# Patient Record
Sex: FEMALE | Race: WHITE | Employment: FULL TIME | URBAN - METROPOLITAN AREA
[De-identification: names, ages, dates, MRNs, and addresses within clinical notes are randomized per-mention and may not be internally consistent; named-entity substitution may affect disease eponyms.]

---

## 2017-03-11 ENCOUNTER — OFFICE VISIT (OUTPATIENT)
Dept: FAMILY MEDICINE CLINIC | Age: 72
End: 2017-03-11
Payer: MEDICARE

## 2017-03-11 VITALS
HEART RATE: 60 BPM | HEIGHT: 64 IN | SYSTOLIC BLOOD PRESSURE: 170 MMHG | BODY MASS INDEX: 22.2 KG/M2 | WEIGHT: 130 LBS | RESPIRATION RATE: 18 BRPM | DIASTOLIC BLOOD PRESSURE: 91 MMHG | OXYGEN SATURATION: 97 % | TEMPERATURE: 97.8 F

## 2017-03-11 DIAGNOSIS — J01.00 ACUTE MAXILLARY SINUSITIS, RECURRENCE NOT SPECIFIED: Primary | ICD-10-CM

## 2017-03-11 DIAGNOSIS — J20.9 ACUTE BRONCHITIS, UNSPECIFIED ORGANISM: ICD-10-CM

## 2017-03-11 PROCEDURE — 99213 OFFICE O/P EST LOW 20 MIN: CPT | Performed by: INTERNAL MEDICINE

## 2017-03-11 PROCEDURE — 1090F PRES/ABSN URINE INCON ASSESS: CPT | Performed by: INTERNAL MEDICINE

## 2017-03-11 PROCEDURE — G8484 FLU IMMUNIZE NO ADMIN: HCPCS | Performed by: INTERNAL MEDICINE

## 2017-03-11 PROCEDURE — 4040F PNEUMOC VAC/ADMIN/RCVD: CPT | Performed by: INTERNAL MEDICINE

## 2017-03-11 PROCEDURE — 1123F ACP DISCUSS/DSCN MKR DOCD: CPT | Performed by: INTERNAL MEDICINE

## 2017-03-11 PROCEDURE — G8419 CALC BMI OUT NRM PARAM NOF/U: HCPCS | Performed by: INTERNAL MEDICINE

## 2017-03-11 PROCEDURE — 1036F TOBACCO NON-USER: CPT | Performed by: INTERNAL MEDICINE

## 2017-03-11 PROCEDURE — 3017F COLORECTAL CA SCREEN DOC REV: CPT | Performed by: INTERNAL MEDICINE

## 2017-03-11 PROCEDURE — 3014F SCREEN MAMMO DOC REV: CPT | Performed by: INTERNAL MEDICINE

## 2017-03-11 PROCEDURE — G8400 PT W/DXA NO RESULTS DOC: HCPCS | Performed by: INTERNAL MEDICINE

## 2017-03-11 PROCEDURE — G8427 DOCREV CUR MEDS BY ELIG CLIN: HCPCS | Performed by: INTERNAL MEDICINE

## 2017-03-11 RX ORDER — HYDROXYCHLOROQUINE SULFATE 200 MG/1
TABLET, FILM COATED ORAL DAILY
COMMUNITY

## 2017-03-11 RX ORDER — DOXYCYCLINE HYCLATE 100 MG/1
100 CAPSULE ORAL 2 TIMES DAILY
Qty: 20 CAPSULE | Refills: 0 | Status: SHIPPED | OUTPATIENT
Start: 2017-03-11 | End: 2017-03-21

## 2017-03-11 ASSESSMENT — ENCOUNTER SYMPTOMS
COUGH: 1
SINUS PRESSURE: 1
SORE THROAT: 0
SHORTNESS OF BREATH: 0

## 2019-04-05 ENCOUNTER — HOSPITAL ENCOUNTER (OUTPATIENT)
Age: 74
Discharge: HOME | End: 2019-04-05
Payer: MEDICARE

## 2019-04-05 LAB
-: ABNORMAL
ABSOLUTE BANDS #: 0.53 K/UL (ref 0–1)
ABSOLUTE EOS #: 0 K/UL (ref 0–0.4)
ABSOLUTE IMMATURE GRANULOCYTE: ABNORMAL K/UL (ref 0–0.3)
ABSOLUTE LYMPH #: 0.26 K/UL (ref 1–4.8)
ABSOLUTE MONO #: 0.53 K/UL (ref 0.1–1.3)
ALBUMIN SERPL-MCNC: 3.4 G/DL (ref 3.5–5.2)
ALBUMIN/GLOBULIN RATIO: ABNORMAL (ref 1–2.5)
ALP BLD-CCNC: 74 U/L (ref 35–104)
ALT SERPL-CCNC: 13 U/L (ref 5–33)
AMORPHOUS: ABNORMAL
ANION GAP SERPL CALCULATED.3IONS-SCNC: 12 MMOL/L (ref 9–17)
AST SERPL-CCNC: 15 U/L
BACTERIA: ABNORMAL
BANDS: 6 % (ref 0–10)
BASOPHILS # BLD: 0 % (ref 0–2)
BASOPHILS ABSOLUTE: 0 K/UL (ref 0–0.2)
BILIRUB SERPL-MCNC: 0.47 MG/DL (ref 0.3–1.2)
BILIRUBIN URINE: NEGATIVE
BUN BLDV-MCNC: 38 MG/DL (ref 8–23)
BUN/CREAT BLD: ABNORMAL (ref 9–20)
C-REACTIVE PROTEIN: 124.6 MG/L (ref 0–5)
CALCIUM SERPL-MCNC: 9.5 MG/DL (ref 8.6–10.4)
CASTS UA: ABNORMAL /LPF
CHLORIDE BLD-SCNC: 107 MMOL/L (ref 98–107)
CO2: 18 MMOL/L (ref 20–31)
COLOR: YELLOW
COMMENT UA: ABNORMAL
COMPLEMENT C3: 110 MG/DL (ref 90–180)
COMPLEMENT C4: 24 MG/DL (ref 10–40)
CREAT SERPL-MCNC: 1.25 MG/DL (ref 0.5–0.9)
CRYSTALS, UA: ABNORMAL /HPF
DIFFERENTIAL TYPE: ABNORMAL
EOSINOPHILS RELATIVE PERCENT: 0 % (ref 0–4)
EPITHELIAL CELLS UA: ABNORMAL /HPF
GFR AFRICAN AMERICAN: 51 ML/MIN
GFR NON-AFRICAN AMERICAN: 42 ML/MIN
GFR SERPL CREATININE-BSD FRML MDRD: ABNORMAL ML/MIN/{1.73_M2}
GFR SERPL CREATININE-BSD FRML MDRD: ABNORMAL ML/MIN/{1.73_M2}
GLUCOSE BLD-MCNC: 98 MG/DL (ref 70–99)
GLUCOSE URINE: NEGATIVE
HCT VFR BLD CALC: 37.1 % (ref 36–46)
HEMOGLOBIN: 12.1 G/DL (ref 12–16)
IMMATURE GRANULOCYTES: ABNORMAL %
KETONES, URINE: NEGATIVE
LEUKOCYTE ESTERASE, URINE: ABNORMAL
LYMPHOCYTES # BLD: 3 % (ref 24–44)
MCH RBC QN AUTO: 31.2 PG (ref 26–34)
MCHC RBC AUTO-ENTMCNC: 32.6 G/DL (ref 31–37)
MCV RBC AUTO: 95.9 FL (ref 80–100)
MONOCYTES # BLD: 6 % (ref 1–7)
MORPHOLOGY: ABNORMAL
MUCUS: ABNORMAL
NITRITE, URINE: NEGATIVE
NRBC AUTOMATED: ABNORMAL PER 100 WBC
OTHER OBSERVATIONS UA: ABNORMAL
PDW BLD-RTO: 14.9 % (ref 11.5–14.9)
PH UA: 6.5 (ref 5–8)
PLATELET # BLD: 203 K/UL (ref 150–450)
PLATELET ESTIMATE: ABNORMAL
PMV BLD AUTO: 7.4 FL (ref 6–12)
POTASSIUM SERPL-SCNC: 5.1 MMOL/L (ref 3.7–5.3)
PROTEIN UA: ABNORMAL
RBC # BLD: 3.87 M/UL (ref 4–5.2)
RBC # BLD: ABNORMAL 10*6/UL
RBC UA: ABNORMAL /HPF
RENAL EPITHELIAL, UA: ABNORMAL /HPF
SEDIMENTATION RATE, ERYTHROCYTE: 57 MM (ref 0–20)
SEG NEUTROPHILS: 85 % (ref 36–66)
SEGMENTED NEUTROPHILS ABSOLUTE COUNT: 7.48 K/UL (ref 1.3–9.1)
SODIUM BLD-SCNC: 137 MMOL/L (ref 135–144)
SPECIFIC GRAVITY UA: 1.01 (ref 1–1.03)
TOTAL PROTEIN: 7.2 G/DL (ref 6.4–8.3)
TRICHOMONAS: ABNORMAL
TURBIDITY: ABNORMAL
URINE HGB: ABNORMAL
UROBILINOGEN, URINE: NORMAL
WBC # BLD: 8.8 K/UL (ref 3.5–11)
WBC # BLD: ABNORMAL 10*3/UL
WBC UA: ABNORMAL /HPF
YEAST: ABNORMAL

## 2019-04-05 PROCEDURE — 36415 COLL VENOUS BLD VENIPUNCTURE: CPT

## 2019-04-05 PROCEDURE — 87086 URINE CULTURE/COLONY COUNT: CPT

## 2019-04-05 PROCEDURE — 87186 SC STD MICRODIL/AGAR DIL: CPT

## 2019-04-05 PROCEDURE — 84156 ASSAY OF PROTEIN URINE: CPT

## 2019-04-05 PROCEDURE — 81001 URINALYSIS AUTO W/SCOPE: CPT

## 2019-04-05 PROCEDURE — 82570 ASSAY OF URINE CREATININE: CPT

## 2019-04-05 PROCEDURE — 86140 C-REACTIVE PROTEIN: CPT

## 2019-04-05 PROCEDURE — 85651 RBC SED RATE NONAUTOMATED: CPT

## 2019-04-05 PROCEDURE — 85025 COMPLETE CBC W/AUTO DIFF WBC: CPT

## 2019-04-05 PROCEDURE — 86160 COMPLEMENT ANTIGEN: CPT

## 2019-04-05 PROCEDURE — 80053 COMPREHEN METABOLIC PANEL: CPT

## 2019-04-05 PROCEDURE — 87088 URINE BACTERIA CULTURE: CPT

## 2019-04-06 LAB
CREATININE URINE: 77.6 MG/DL (ref 28–217)
CULTURE: ABNORMAL
Lab: ABNORMAL
SPECIMEN DESCRIPTION: ABNORMAL
TOTAL PROTEIN, URINE: 44 MG/DL
URINE TOTAL PROTEIN CREATININE RATIO: 0.57 (ref 0–0.2)

## 2019-05-31 ENCOUNTER — HOSPITAL ENCOUNTER (OUTPATIENT)
Age: 74
Discharge: HOME | End: 2019-05-31
Payer: MEDICARE

## 2019-05-31 LAB
-: ABNORMAL
ABSOLUTE EOS #: 0.06 K/UL (ref 0–0.4)
ABSOLUTE IMMATURE GRANULOCYTE: ABNORMAL K/UL (ref 0–0.3)
ABSOLUTE LYMPH #: 0.61 K/UL (ref 1–4.8)
ABSOLUTE MONO #: 0.32 K/UL (ref 0.1–1.3)
ALBUMIN SERPL-MCNC: 3.8 G/DL (ref 3.5–5.2)
ALBUMIN/GLOBULIN RATIO: ABNORMAL (ref 1–2.5)
ALP BLD-CCNC: 72 U/L (ref 35–104)
ALT SERPL-CCNC: 16 U/L (ref 5–33)
AMORPHOUS: ABNORMAL
ANION GAP SERPL CALCULATED.3IONS-SCNC: 11 MMOL/L (ref 9–17)
AST SERPL-CCNC: 24 U/L
BACTERIA: ABNORMAL
BASOPHILS # BLD: 0 % (ref 0–2)
BASOPHILS ABSOLUTE: 0 K/UL (ref 0–0.2)
BILIRUB SERPL-MCNC: 0.47 MG/DL (ref 0.3–1.2)
BILIRUBIN URINE: NEGATIVE
BUN BLDV-MCNC: 32 MG/DL (ref 8–23)
BUN/CREAT BLD: ABNORMAL (ref 9–20)
C-REACTIVE PROTEIN: 1.4 MG/L (ref 0–5)
CALCIUM SERPL-MCNC: 9.2 MG/DL (ref 8.6–10.4)
CASTS UA: ABNORMAL /LPF
CHLORIDE BLD-SCNC: 105 MMOL/L (ref 98–107)
CO2: 21 MMOL/L (ref 20–31)
COLOR: YELLOW
COMMENT UA: ABNORMAL
COMPLEMENT C3: 90 MG/DL (ref 90–180)
COMPLEMENT C4: 18 MG/DL (ref 10–40)
CREAT SERPL-MCNC: 1.36 MG/DL (ref 0.5–0.9)
CREATININE URINE: 92.3 MG/DL (ref 28–217)
CRYSTALS, UA: ABNORMAL /HPF
DIFFERENTIAL TYPE: ABNORMAL
EOSINOPHILS RELATIVE PERCENT: 2 % (ref 0–4)
EPITHELIAL CELLS UA: ABNORMAL /HPF
GFR AFRICAN AMERICAN: 46 ML/MIN
GFR NON-AFRICAN AMERICAN: 38 ML/MIN
GFR SERPL CREATININE-BSD FRML MDRD: ABNORMAL ML/MIN/{1.73_M2}
GFR SERPL CREATININE-BSD FRML MDRD: ABNORMAL ML/MIN/{1.73_M2}
GLUCOSE BLD-MCNC: 95 MG/DL (ref 70–99)
GLUCOSE URINE: NEGATIVE
HCT VFR BLD CALC: 41.8 % (ref 36–46)
HEMOGLOBIN: 13.7 G/DL (ref 12–16)
IMMATURE GRANULOCYTES: ABNORMAL %
KETONES, URINE: NEGATIVE
LEUKOCYTE ESTERASE, URINE: ABNORMAL
LYMPHOCYTES # BLD: 21 % (ref 24–44)
MCH RBC QN AUTO: 31.7 PG (ref 26–34)
MCHC RBC AUTO-ENTMCNC: 32.8 G/DL (ref 31–37)
MCV RBC AUTO: 96.6 FL (ref 80–100)
MONOCYTES # BLD: 11 % (ref 1–7)
MORPHOLOGY: ABNORMAL
MORPHOLOGY: ABNORMAL
MUCUS: ABNORMAL
NITRITE, URINE: NEGATIVE
NRBC AUTOMATED: ABNORMAL PER 100 WBC
OTHER OBSERVATIONS UA: ABNORMAL
PDW BLD-RTO: 15.2 % (ref 11.5–14.9)
PH UA: 6.5 (ref 5–8)
PLATELET # BLD: 179 K/UL (ref 150–450)
PLATELET ESTIMATE: ABNORMAL
PMV BLD AUTO: 7.7 FL (ref 6–12)
POTASSIUM SERPL-SCNC: 5.6 MMOL/L (ref 3.7–5.3)
PROTEIN UA: ABNORMAL
RBC # BLD: 4.33 M/UL (ref 4–5.2)
RBC # BLD: ABNORMAL 10*6/UL
RBC UA: ABNORMAL /HPF
RENAL EPITHELIAL, UA: ABNORMAL /HPF
SEDIMENTATION RATE, ERYTHROCYTE: 20 MM (ref 0–20)
SEG NEUTROPHILS: 66 % (ref 36–66)
SEGMENTED NEUTROPHILS ABSOLUTE COUNT: 1.91 K/UL (ref 1.3–9.1)
SODIUM BLD-SCNC: 137 MMOL/L (ref 135–144)
SPECIFIC GRAVITY UA: 1.01 (ref 1–1.03)
TOTAL PROTEIN, URINE: 24 MG/DL
TOTAL PROTEIN: 7.3 G/DL (ref 6.4–8.3)
TRICHOMONAS: ABNORMAL
TURBIDITY: ABNORMAL
URINE HGB: NEGATIVE
URINE TOTAL PROTEIN CREATININE RATIO: 0.26 (ref 0–0.2)
UROBILINOGEN, URINE: NORMAL
WBC # BLD: 2.9 K/UL (ref 3.5–11)
WBC # BLD: ABNORMAL 10*3/UL
WBC UA: ABNORMAL /HPF
YEAST: ABNORMAL

## 2019-05-31 PROCEDURE — 81001 URINALYSIS AUTO W/SCOPE: CPT

## 2019-05-31 PROCEDURE — 36415 COLL VENOUS BLD VENIPUNCTURE: CPT

## 2019-05-31 PROCEDURE — 85025 COMPLETE CBC W/AUTO DIFF WBC: CPT

## 2019-05-31 PROCEDURE — 87186 SC STD MICRODIL/AGAR DIL: CPT

## 2019-05-31 PROCEDURE — 87088 URINE BACTERIA CULTURE: CPT

## 2019-05-31 PROCEDURE — 86140 C-REACTIVE PROTEIN: CPT

## 2019-05-31 PROCEDURE — 86160 COMPLEMENT ANTIGEN: CPT

## 2019-05-31 PROCEDURE — 85651 RBC SED RATE NONAUTOMATED: CPT

## 2019-05-31 PROCEDURE — 80053 COMPREHEN METABOLIC PANEL: CPT

## 2019-05-31 PROCEDURE — 82570 ASSAY OF URINE CREATININE: CPT

## 2019-05-31 PROCEDURE — 87086 URINE CULTURE/COLONY COUNT: CPT

## 2019-05-31 PROCEDURE — 84156 ASSAY OF PROTEIN URINE: CPT

## 2019-06-01 LAB
CULTURE: ABNORMAL
Lab: ABNORMAL
SPECIMEN DESCRIPTION: ABNORMAL

## 2019-09-20 ENCOUNTER — HOSPITAL ENCOUNTER (OUTPATIENT)
Age: 74
Discharge: HOME | End: 2019-09-20
Payer: MEDICARE

## 2019-09-20 LAB
-: NORMAL
ALBUMIN SERPL-MCNC: 3.9 G/DL (ref 3.5–5.2)
ALBUMIN/GLOBULIN RATIO: ABNORMAL (ref 1–2.5)
ALP BLD-CCNC: 70 U/L (ref 35–104)
ALT SERPL-CCNC: 14 U/L (ref 5–33)
AMORPHOUS: NORMAL
ANION GAP SERPL CALCULATED.3IONS-SCNC: 13 MMOL/L (ref 9–17)
AST SERPL-CCNC: 20 U/L
BACTERIA: NORMAL
BILIRUB SERPL-MCNC: 0.47 MG/DL (ref 0.3–1.2)
BILIRUBIN URINE: NEGATIVE
BUN BLDV-MCNC: 35 MG/DL (ref 8–23)
BUN/CREAT BLD: ABNORMAL (ref 9–20)
C-REACTIVE PROTEIN: 11.5 MG/L (ref 0–5)
CALCIUM SERPL-MCNC: 9 MG/DL (ref 8.6–10.4)
CASTS UA: NORMAL /LPF
CHLORIDE BLD-SCNC: 106 MMOL/L (ref 98–107)
CO2: 19 MMOL/L (ref 20–31)
COLOR: YELLOW
COMMENT UA: ABNORMAL
COMPLEMENT C3: 93 MG/DL (ref 90–180)
COMPLEMENT C4: 18 MG/DL (ref 10–40)
CREAT SERPL-MCNC: 1.25 MG/DL (ref 0.5–0.9)
CREATININE URINE: 52.7 MG/DL (ref 28–217)
CRYSTALS, UA: NORMAL /HPF
EPITHELIAL CELLS UA: NORMAL /HPF
GFR AFRICAN AMERICAN: 51 ML/MIN
GFR NON-AFRICAN AMERICAN: 42 ML/MIN
GFR SERPL CREATININE-BSD FRML MDRD: ABNORMAL ML/MIN/{1.73_M2}
GFR SERPL CREATININE-BSD FRML MDRD: ABNORMAL ML/MIN/{1.73_M2}
GLUCOSE BLD-MCNC: 91 MG/DL (ref 70–99)
GLUCOSE URINE: NEGATIVE
KETONES, URINE: NEGATIVE
LEUKOCYTE ESTERASE, URINE: ABNORMAL
MUCUS: NORMAL
NITRITE, URINE: NEGATIVE
OTHER OBSERVATIONS UA: NORMAL
PH UA: 7 (ref 5–8)
POTASSIUM SERPL-SCNC: 5.3 MMOL/L (ref 3.7–5.3)
PROTEIN UA: ABNORMAL
RBC UA: NORMAL /HPF
RENAL EPITHELIAL, UA: NORMAL /HPF
SEDIMENTATION RATE, ERYTHROCYTE: 23 MM (ref 0–20)
SODIUM BLD-SCNC: 138 MMOL/L (ref 135–144)
SPECIFIC GRAVITY UA: 1.01 (ref 1–1.03)
TOTAL PROTEIN, URINE: 18 MG/DL
TOTAL PROTEIN: 7.1 G/DL (ref 6.4–8.3)
TRICHOMONAS: NORMAL
TURBIDITY: CLEAR
URINE HGB: NEGATIVE
URINE TOTAL PROTEIN CREATININE RATIO: 0.34 (ref 0–0.2)
UROBILINOGEN, URINE: NORMAL
WBC UA: NORMAL /HPF
YEAST: NORMAL

## 2019-09-20 PROCEDURE — 86140 C-REACTIVE PROTEIN: CPT

## 2019-09-20 PROCEDURE — 82570 ASSAY OF URINE CREATININE: CPT

## 2019-09-20 PROCEDURE — 81001 URINALYSIS AUTO W/SCOPE: CPT

## 2019-09-20 PROCEDURE — 85651 RBC SED RATE NONAUTOMATED: CPT

## 2019-09-20 PROCEDURE — 86160 COMPLEMENT ANTIGEN: CPT

## 2019-09-20 PROCEDURE — 80053 COMPREHEN METABOLIC PANEL: CPT

## 2019-09-20 PROCEDURE — 36415 COLL VENOUS BLD VENIPUNCTURE: CPT

## 2019-09-20 PROCEDURE — 84156 ASSAY OF PROTEIN URINE: CPT

## 2019-11-25 ENCOUNTER — HOSPITAL ENCOUNTER (OUTPATIENT)
Age: 74
Discharge: HOME | End: 2019-11-25
Payer: MEDICARE

## 2019-11-25 LAB
-: ABNORMAL
ABSOLUTE EOS #: 0.1 K/UL (ref 0–0.4)
ABSOLUTE IMMATURE GRANULOCYTE: ABNORMAL K/UL (ref 0–0.3)
ABSOLUTE LYMPH #: 0.4 K/UL (ref 1–4.8)
ABSOLUTE MONO #: 0.4 K/UL (ref 0.1–1.3)
ALBUMIN SERPL-MCNC: 3.3 G/DL (ref 3.5–5.2)
ALBUMIN/GLOBULIN RATIO: ABNORMAL (ref 1–2.5)
ALP BLD-CCNC: 75 U/L (ref 35–104)
ALT SERPL-CCNC: 12 U/L (ref 5–33)
AMORPHOUS: ABNORMAL
ANION GAP SERPL CALCULATED.3IONS-SCNC: 14 MMOL/L (ref 9–17)
AST SERPL-CCNC: 16 U/L
BACTERIA: ABNORMAL
BASOPHILS # BLD: 1 % (ref 0–2)
BASOPHILS ABSOLUTE: 0 K/UL (ref 0–0.2)
BILIRUB SERPL-MCNC: 0.36 MG/DL (ref 0.3–1.2)
BILIRUBIN URINE: NEGATIVE
BUN BLDV-MCNC: 33 MG/DL (ref 8–23)
BUN/CREAT BLD: ABNORMAL (ref 9–20)
C-REACTIVE PROTEIN: 91.8 MG/L (ref 0–5)
CALCIUM SERPL-MCNC: 9.3 MG/DL (ref 8.6–10.4)
CASTS UA: ABNORMAL /LPF
CASTS UA: ABNORMAL /LPF
CHLORIDE BLD-SCNC: 106 MMOL/L (ref 98–107)
CO2: 19 MMOL/L (ref 20–31)
COLOR: YELLOW
COMMENT UA: ABNORMAL
COMPLEMENT C3: 116 MG/DL (ref 90–180)
COMPLEMENT C4: 25 MG/DL (ref 10–40)
CREAT SERPL-MCNC: 1.4 MG/DL (ref 0.5–0.9)
CRYSTALS, UA: ABNORMAL /HPF
DIFFERENTIAL TYPE: ABNORMAL
EOSINOPHILS RELATIVE PERCENT: 2 % (ref 0–4)
EPITHELIAL CELLS UA: ABNORMAL /HPF
GFR AFRICAN AMERICAN: 45 ML/MIN
GFR NON-AFRICAN AMERICAN: 37 ML/MIN
GFR SERPL CREATININE-BSD FRML MDRD: ABNORMAL ML/MIN/{1.73_M2}
GFR SERPL CREATININE-BSD FRML MDRD: ABNORMAL ML/MIN/{1.73_M2}
GLUCOSE BLD-MCNC: 85 MG/DL (ref 70–99)
GLUCOSE URINE: NEGATIVE
HCT VFR BLD CALC: 37.8 % (ref 36–46)
HEMOGLOBIN: 12.4 G/DL (ref 12–16)
IMMATURE GRANULOCYTES: ABNORMAL %
KETONES, URINE: NEGATIVE
LEUKOCYTE ESTERASE, URINE: ABNORMAL
LYMPHOCYTES # BLD: 10 % (ref 24–44)
MCH RBC QN AUTO: 31.6 PG (ref 26–34)
MCHC RBC AUTO-ENTMCNC: 32.9 G/DL (ref 31–37)
MCV RBC AUTO: 96.2 FL (ref 80–100)
MONOCYTES # BLD: 10 % (ref 1–7)
MUCUS: ABNORMAL
NITRITE, URINE: NEGATIVE
NRBC AUTOMATED: ABNORMAL PER 100 WBC
OTHER OBSERVATIONS UA: ABNORMAL
PDW BLD-RTO: 13.7 % (ref 11.5–14.9)
PH UA: 7 (ref 5–8)
PLATELET # BLD: 211 K/UL (ref 150–450)
PLATELET ESTIMATE: ABNORMAL
PMV BLD AUTO: 7.1 FL (ref 6–12)
POTASSIUM SERPL-SCNC: 4.6 MMOL/L (ref 3.7–5.3)
PROTEIN UA: ABNORMAL
RBC # BLD: 3.93 M/UL (ref 4–5.2)
RBC # BLD: ABNORMAL 10*6/UL
RBC UA: ABNORMAL /HPF
RENAL EPITHELIAL, UA: ABNORMAL /HPF
SEDIMENTATION RATE, ERYTHROCYTE: 76 MM (ref 0–20)
SEG NEUTROPHILS: 77 % (ref 36–66)
SEGMENTED NEUTROPHILS ABSOLUTE COUNT: 3.3 K/UL (ref 1.3–9.1)
SODIUM BLD-SCNC: 139 MMOL/L (ref 135–144)
SPECIFIC GRAVITY UA: 1.01 (ref 1–1.03)
TOTAL PROTEIN, URINE: 36 MG/DL
TOTAL PROTEIN: 7.3 G/DL (ref 6.4–8.3)
TRICHOMONAS: ABNORMAL
TURBIDITY: ABNORMAL
URINE HGB: NEGATIVE
UROBILINOGEN, URINE: NORMAL
WBC # BLD: 4.3 K/UL (ref 3.5–11)
WBC # BLD: ABNORMAL 10*3/UL
WBC UA: ABNORMAL /HPF
YEAST: ABNORMAL

## 2019-11-25 PROCEDURE — 80053 COMPREHEN METABOLIC PANEL: CPT

## 2019-11-25 PROCEDURE — 87088 URINE BACTERIA CULTURE: CPT

## 2019-11-25 PROCEDURE — 36415 COLL VENOUS BLD VENIPUNCTURE: CPT

## 2019-11-25 PROCEDURE — 85025 COMPLETE CBC W/AUTO DIFF WBC: CPT

## 2019-11-25 PROCEDURE — 87086 URINE CULTURE/COLONY COUNT: CPT

## 2019-11-25 PROCEDURE — 81001 URINALYSIS AUTO W/SCOPE: CPT

## 2019-11-25 PROCEDURE — 86140 C-REACTIVE PROTEIN: CPT

## 2019-11-25 PROCEDURE — 86160 COMPLEMENT ANTIGEN: CPT

## 2019-11-25 PROCEDURE — 84156 ASSAY OF PROTEIN URINE: CPT

## 2019-11-25 PROCEDURE — 85651 RBC SED RATE NONAUTOMATED: CPT

## 2019-11-27 LAB
CULTURE: ABNORMAL
Lab: ABNORMAL
SPECIMEN DESCRIPTION: ABNORMAL

## 2020-01-21 ENCOUNTER — HOSPITAL ENCOUNTER (OUTPATIENT)
Age: 75
Discharge: HOME | End: 2020-01-21
Payer: MEDICARE

## 2020-01-21 LAB
-: ABNORMAL
ABSOLUTE EOS #: 0.1 K/UL (ref 0–0.4)
ABSOLUTE IMMATURE GRANULOCYTE: ABNORMAL K/UL (ref 0–0.3)
ABSOLUTE LYMPH #: 0.6 K/UL (ref 1–4.8)
ABSOLUTE MONO #: 0.4 K/UL (ref 0.1–1.3)
ALBUMIN SERPL-MCNC: 3.9 G/DL (ref 3.5–5.2)
ALBUMIN/GLOBULIN RATIO: ABNORMAL (ref 1–2.5)
ALP BLD-CCNC: 63 U/L (ref 35–104)
ALT SERPL-CCNC: 13 U/L (ref 5–33)
AMORPHOUS: ABNORMAL
ANION GAP SERPL CALCULATED.3IONS-SCNC: 12 MMOL/L (ref 9–17)
AST SERPL-CCNC: 20 U/L
BACTERIA: ABNORMAL
BASOPHILS # BLD: 1 % (ref 0–2)
BASOPHILS ABSOLUTE: 0 K/UL (ref 0–0.2)
BILIRUB SERPL-MCNC: 0.31 MG/DL (ref 0.3–1.2)
BILIRUBIN URINE: NEGATIVE
BUN BLDV-MCNC: 33 MG/DL (ref 8–23)
BUN/CREAT BLD: ABNORMAL (ref 9–20)
C-REACTIVE PROTEIN: 1.2 MG/L (ref 0–5)
CALCIUM SERPL-MCNC: 8.7 MG/DL (ref 8.6–10.4)
CASTS UA: ABNORMAL /LPF
CHLORIDE BLD-SCNC: 106 MMOL/L (ref 98–107)
CO2: 18 MMOL/L (ref 20–31)
COLOR: YELLOW
COMMENT UA: ABNORMAL
COMPLEMENT C3: 79 MG/DL (ref 90–180)
COMPLEMENT C4: 16 MG/DL (ref 10–40)
CREAT SERPL-MCNC: 1.34 MG/DL (ref 0.5–0.9)
CREATININE URINE: 97.8 MG/DL (ref 28–217)
CRYSTALS, UA: ABNORMAL /HPF
DIFFERENTIAL TYPE: ABNORMAL
EOSINOPHILS RELATIVE PERCENT: 4 % (ref 0–4)
EPITHELIAL CELLS UA: ABNORMAL /HPF
GFR AFRICAN AMERICAN: 47 ML/MIN
GFR NON-AFRICAN AMERICAN: 39 ML/MIN
GFR SERPL CREATININE-BSD FRML MDRD: ABNORMAL ML/MIN/{1.73_M2}
GFR SERPL CREATININE-BSD FRML MDRD: ABNORMAL ML/MIN/{1.73_M2}
GLUCOSE BLD-MCNC: 78 MG/DL (ref 70–99)
GLUCOSE URINE: NEGATIVE
HCT VFR BLD CALC: 40.8 % (ref 36–46)
HEMOGLOBIN: 13.3 G/DL (ref 12–16)
IMMATURE GRANULOCYTES: ABNORMAL %
KETONES, URINE: NEGATIVE
LEUKOCYTE ESTERASE, URINE: NEGATIVE
LYMPHOCYTES # BLD: 18 % (ref 24–44)
MCH RBC QN AUTO: 31.8 PG (ref 26–34)
MCHC RBC AUTO-ENTMCNC: 32.6 G/DL (ref 31–37)
MCV RBC AUTO: 97.6 FL (ref 80–100)
MONOCYTES # BLD: 11 % (ref 1–7)
MUCUS: ABNORMAL
NITRITE, URINE: NEGATIVE
NRBC AUTOMATED: ABNORMAL PER 100 WBC
OTHER OBSERVATIONS UA: ABNORMAL
PDW BLD-RTO: 15.5 % (ref 11.5–14.9)
PH UA: 6.5 (ref 5–8)
PLATELET # BLD: 179 K/UL (ref 150–450)
PLATELET ESTIMATE: ABNORMAL
PMV BLD AUTO: 7.4 FL (ref 6–12)
POTASSIUM SERPL-SCNC: 4.7 MMOL/L (ref 3.7–5.3)
PROTEIN UA: ABNORMAL
RBC # BLD: 4.18 M/UL (ref 4–5.2)
RBC # BLD: ABNORMAL 10*6/UL
RBC UA: ABNORMAL /HPF
RENAL EPITHELIAL, UA: ABNORMAL /HPF
SEDIMENTATION RATE, ERYTHROCYTE: 18 MM (ref 0–20)
SEG NEUTROPHILS: 66 % (ref 36–66)
SEGMENTED NEUTROPHILS ABSOLUTE COUNT: 2.3 K/UL (ref 1.3–9.1)
SODIUM BLD-SCNC: 136 MMOL/L (ref 135–144)
SPECIFIC GRAVITY UA: 1.02 (ref 1–1.03)
TOTAL PROTEIN, URINE: 46 MG/DL
TOTAL PROTEIN: 7 G/DL (ref 6.4–8.3)
TRICHOMONAS: ABNORMAL
TURBIDITY: CLEAR
URINE HGB: NEGATIVE
URINE TOTAL PROTEIN CREATININE RATIO: 0.47 (ref 0–0.2)
UROBILINOGEN, URINE: NORMAL
WBC # BLD: 3.5 K/UL (ref 3.5–11)
WBC # BLD: ABNORMAL 10*3/UL
WBC UA: ABNORMAL /HPF
YEAST: ABNORMAL

## 2020-01-21 PROCEDURE — 86140 C-REACTIVE PROTEIN: CPT

## 2020-01-21 PROCEDURE — 85025 COMPLETE CBC W/AUTO DIFF WBC: CPT

## 2020-01-21 PROCEDURE — 80053 COMPREHEN METABOLIC PANEL: CPT

## 2020-01-21 PROCEDURE — 36415 COLL VENOUS BLD VENIPUNCTURE: CPT

## 2020-01-21 PROCEDURE — 86160 COMPLEMENT ANTIGEN: CPT

## 2020-01-21 PROCEDURE — 85651 RBC SED RATE NONAUTOMATED: CPT

## 2020-01-21 PROCEDURE — 82570 ASSAY OF URINE CREATININE: CPT

## 2020-01-21 PROCEDURE — 81001 URINALYSIS AUTO W/SCOPE: CPT

## 2020-01-21 PROCEDURE — 84156 ASSAY OF PROTEIN URINE: CPT

## 2020-01-21 PROCEDURE — 87086 URINE CULTURE/COLONY COUNT: CPT

## 2020-01-22 LAB
CULTURE: NO GROWTH
Lab: NORMAL
SPECIMEN DESCRIPTION: NORMAL

## 2020-05-20 ENCOUNTER — HOSPITAL ENCOUNTER (OUTPATIENT)
Age: 75
Discharge: HOME | End: 2020-05-20
Payer: MEDICARE

## 2020-05-20 LAB
-: ABNORMAL
ABSOLUTE EOS #: 0.1 K/UL (ref 0–0.4)
ABSOLUTE IMMATURE GRANULOCYTE: ABNORMAL K/UL (ref 0–0.3)
ABSOLUTE LYMPH #: 0.5 K/UL (ref 1–4.8)
ABSOLUTE MONO #: 0.5 K/UL (ref 0.1–1.3)
ALBUMIN SERPL-MCNC: 3.8 G/DL (ref 3.5–5.2)
ALBUMIN/GLOBULIN RATIO: ABNORMAL (ref 1–2.5)
ALP BLD-CCNC: 71 U/L (ref 35–104)
ALT SERPL-CCNC: 12 U/L (ref 5–33)
AMORPHOUS: ABNORMAL
ANION GAP SERPL CALCULATED.3IONS-SCNC: 14 MMOL/L (ref 9–17)
AST SERPL-CCNC: 18 U/L
BACTERIA: ABNORMAL
BASOPHILS # BLD: 1 % (ref 0–2)
BASOPHILS ABSOLUTE: 0 K/UL (ref 0–0.2)
BILIRUB SERPL-MCNC: 0.32 MG/DL (ref 0.3–1.2)
BILIRUBIN URINE: NEGATIVE
BUN BLDV-MCNC: 24 MG/DL (ref 8–23)
BUN/CREAT BLD: ABNORMAL (ref 9–20)
C-REACTIVE PROTEIN: 46.5 MG/L (ref 0–5)
CALCIUM SERPL-MCNC: 9.1 MG/DL (ref 8.6–10.4)
CASTS UA: ABNORMAL /LPF
CHLORIDE BLD-SCNC: 105 MMOL/L (ref 98–107)
CO2: 20 MMOL/L (ref 20–31)
COLOR: YELLOW
COMMENT UA: ABNORMAL
COMPLEMENT C3: 102 MG/DL (ref 90–180)
COMPLEMENT C4: 22 MG/DL (ref 10–40)
CREAT SERPL-MCNC: 1.51 MG/DL (ref 0.5–0.9)
CREATININE URINE: 131 MG/DL (ref 28–217)
CRYSTALS, UA: ABNORMAL /HPF
DIFFERENTIAL TYPE: ABNORMAL
EOSINOPHILS RELATIVE PERCENT: 1 % (ref 0–4)
EPITHELIAL CELLS UA: ABNORMAL /HPF
GFR AFRICAN AMERICAN: 41 ML/MIN
GFR NON-AFRICAN AMERICAN: 34 ML/MIN
GFR SERPL CREATININE-BSD FRML MDRD: ABNORMAL ML/MIN/{1.73_M2}
GFR SERPL CREATININE-BSD FRML MDRD: ABNORMAL ML/MIN/{1.73_M2}
GLUCOSE BLD-MCNC: 90 MG/DL (ref 70–99)
GLUCOSE URINE: NEGATIVE
HCT VFR BLD CALC: 41.6 % (ref 36–46)
HEMOGLOBIN: 13.8 G/DL (ref 12–16)
IMMATURE GRANULOCYTES: ABNORMAL %
KETONES, URINE: NEGATIVE
LEUKOCYTE ESTERASE, URINE: ABNORMAL
LYMPHOCYTES # BLD: 11 % (ref 24–44)
MCH RBC QN AUTO: 32.5 PG (ref 26–34)
MCHC RBC AUTO-ENTMCNC: 33.1 G/DL (ref 31–37)
MCV RBC AUTO: 98 FL (ref 80–100)
MONOCYTES # BLD: 12 % (ref 1–7)
MUCUS: ABNORMAL
NITRITE, URINE: NEGATIVE
NRBC AUTOMATED: ABNORMAL PER 100 WBC
OTHER OBSERVATIONS UA: ABNORMAL
PDW BLD-RTO: 14.8 % (ref 11.5–14.9)
PH UA: 6 (ref 5–8)
PLATELET # BLD: 155 K/UL (ref 150–450)
PLATELET ESTIMATE: ABNORMAL
PMV BLD AUTO: 7.3 FL (ref 6–12)
POTASSIUM SERPL-SCNC: 4.2 MMOL/L (ref 3.7–5.3)
PROTEIN UA: ABNORMAL
RBC # BLD: 4.24 M/UL (ref 4–5.2)
RBC # BLD: ABNORMAL 10*6/UL
RBC UA: ABNORMAL /HPF
RENAL EPITHELIAL, UA: ABNORMAL /HPF
SEDIMENTATION RATE, ERYTHROCYTE: 30 MM (ref 0–20)
SEG NEUTROPHILS: 75 % (ref 36–66)
SEGMENTED NEUTROPHILS ABSOLUTE COUNT: 3.1 K/UL (ref 1.3–9.1)
SODIUM BLD-SCNC: 139 MMOL/L (ref 135–144)
SPECIFIC GRAVITY UA: 1.01 (ref 1–1.03)
TOTAL PROTEIN, URINE: 69 MG/DL
TOTAL PROTEIN: 7.4 G/DL (ref 6.4–8.3)
TRICHOMONAS: ABNORMAL
TURBIDITY: ABNORMAL
URINE HGB: NEGATIVE
URINE TOTAL PROTEIN CREATININE RATIO: 0.53 (ref 0–0.2)
UROBILINOGEN, URINE: NORMAL
WBC # BLD: 4.2 K/UL (ref 3.5–11)
WBC # BLD: ABNORMAL 10*3/UL
WBC UA: ABNORMAL /HPF
YEAST: ABNORMAL

## 2020-05-20 PROCEDURE — 36415 COLL VENOUS BLD VENIPUNCTURE: CPT

## 2020-05-20 PROCEDURE — 86140 C-REACTIVE PROTEIN: CPT

## 2020-05-20 PROCEDURE — 81001 URINALYSIS AUTO W/SCOPE: CPT

## 2020-05-20 PROCEDURE — 86160 COMPLEMENT ANTIGEN: CPT

## 2020-05-20 PROCEDURE — 85651 RBC SED RATE NONAUTOMATED: CPT

## 2020-05-20 PROCEDURE — 80053 COMPREHEN METABOLIC PANEL: CPT

## 2020-05-20 PROCEDURE — 82570 ASSAY OF URINE CREATININE: CPT

## 2020-05-20 PROCEDURE — 85025 COMPLETE CBC W/AUTO DIFF WBC: CPT

## 2020-05-20 PROCEDURE — 84156 ASSAY OF PROTEIN URINE: CPT

## 2020-07-20 ENCOUNTER — HOSPITAL ENCOUNTER (OUTPATIENT)
Age: 75
Discharge: HOME | End: 2020-07-20
Payer: MEDICARE

## 2020-07-20 LAB
-: ABNORMAL
ABSOLUTE EOS #: 0.1 K/UL (ref 0–0.4)
ABSOLUTE IMMATURE GRANULOCYTE: ABNORMAL K/UL (ref 0–0.3)
ABSOLUTE LYMPH #: 0.7 K/UL (ref 1–4.8)
ABSOLUTE MONO #: 0.4 K/UL (ref 0.1–1.3)
ALBUMIN SERPL-MCNC: 3.8 G/DL (ref 3.5–5.2)
ALBUMIN/GLOBULIN RATIO: ABNORMAL (ref 1–2.5)
ALP BLD-CCNC: 77 U/L (ref 35–104)
ALT SERPL-CCNC: 13 U/L (ref 5–33)
AMORPHOUS: ABNORMAL
ANION GAP SERPL CALCULATED.3IONS-SCNC: 15 MMOL/L (ref 9–17)
AST SERPL-CCNC: 22 U/L
BACTERIA: ABNORMAL
BASOPHILS # BLD: 1 % (ref 0–2)
BASOPHILS ABSOLUTE: 0 K/UL (ref 0–0.2)
BILIRUB SERPL-MCNC: 0.24 MG/DL (ref 0.3–1.2)
BILIRUBIN URINE: NEGATIVE
BUN BLDV-MCNC: 38 MG/DL (ref 8–23)
BUN/CREAT BLD: ABNORMAL (ref 9–20)
C-REACTIVE PROTEIN: 4.7 MG/L (ref 0–5)
CALCIUM SERPL-MCNC: 9.4 MG/DL (ref 8.6–10.4)
CASTS UA: ABNORMAL /LPF
CHLORIDE BLD-SCNC: 107 MMOL/L (ref 98–107)
CO2: 16 MMOL/L (ref 20–31)
COLOR: YELLOW
COMMENT UA: ABNORMAL
COMPLEMENT C3: 88 MG/DL (ref 90–180)
COMPLEMENT C4: 23 MG/DL (ref 10–40)
CREAT SERPL-MCNC: 1.69 MG/DL (ref 0.5–0.9)
CREATININE URINE: 93.4 MG/DL (ref 28–217)
CRYSTALS, UA: ABNORMAL /HPF
DIFFERENTIAL TYPE: ABNORMAL
EOSINOPHILS RELATIVE PERCENT: 2 % (ref 0–4)
EPITHELIAL CELLS UA: ABNORMAL /HPF
GFR AFRICAN AMERICAN: 36 ML/MIN
GFR NON-AFRICAN AMERICAN: 30 ML/MIN
GFR SERPL CREATININE-BSD FRML MDRD: ABNORMAL ML/MIN/{1.73_M2}
GFR SERPL CREATININE-BSD FRML MDRD: ABNORMAL ML/MIN/{1.73_M2}
GLUCOSE BLD-MCNC: 86 MG/DL (ref 70–99)
GLUCOSE URINE: NEGATIVE
HCT VFR BLD CALC: 43.7 % (ref 36–46)
HEMOGLOBIN: 14.1 G/DL (ref 12–16)
IMMATURE GRANULOCYTES: ABNORMAL %
KETONES, URINE: NEGATIVE
LEUKOCYTE ESTERASE, URINE: ABNORMAL
LYMPHOCYTES # BLD: 17 % (ref 24–44)
MCH RBC QN AUTO: 31.6 PG (ref 26–34)
MCHC RBC AUTO-ENTMCNC: 32.3 G/DL (ref 31–37)
MCV RBC AUTO: 98 FL (ref 80–100)
MONOCYTES # BLD: 10 % (ref 1–7)
MUCUS: ABNORMAL
NITRITE, URINE: NEGATIVE
NRBC AUTOMATED: ABNORMAL PER 100 WBC
OTHER OBSERVATIONS UA: ABNORMAL
PDW BLD-RTO: 15 % (ref 11.5–14.9)
PH UA: 6.5 (ref 5–8)
PLATELET # BLD: 171 K/UL (ref 150–450)
PLATELET ESTIMATE: ABNORMAL
PMV BLD AUTO: 7.5 FL (ref 6–12)
POTASSIUM SERPL-SCNC: 4.7 MMOL/L (ref 3.7–5.3)
PROTEIN UA: ABNORMAL
RBC # BLD: 4.46 M/UL (ref 4–5.2)
RBC # BLD: ABNORMAL 10*6/UL
RBC UA: ABNORMAL /HPF
RENAL EPITHELIAL, UA: ABNORMAL /HPF
SEDIMENTATION RATE, ERYTHROCYTE: 23 MM (ref 0–20)
SEG NEUTROPHILS: 70 % (ref 36–66)
SEGMENTED NEUTROPHILS ABSOLUTE COUNT: 2.9 K/UL (ref 1.3–9.1)
SODIUM BLD-SCNC: 138 MMOL/L (ref 135–144)
SPECIFIC GRAVITY UA: 1.01 (ref 1–1.03)
TOTAL PROTEIN, URINE: 36 MG/DL
TOTAL PROTEIN: 7.6 G/DL (ref 6.4–8.3)
TRICHOMONAS: ABNORMAL
TURBIDITY: CLEAR
URINE HGB: NEGATIVE
URINE TOTAL PROTEIN CREATININE RATIO: 0.39 (ref 0–0.2)
UROBILINOGEN, URINE: NORMAL
WBC # BLD: 4.1 K/UL (ref 3.5–11)
WBC # BLD: ABNORMAL 10*3/UL
WBC UA: ABNORMAL /HPF
YEAST: ABNORMAL

## 2020-07-20 PROCEDURE — 87086 URINE CULTURE/COLONY COUNT: CPT

## 2020-07-20 PROCEDURE — 87186 SC STD MICRODIL/AGAR DIL: CPT

## 2020-07-20 PROCEDURE — 86160 COMPLEMENT ANTIGEN: CPT

## 2020-07-20 PROCEDURE — 36415 COLL VENOUS BLD VENIPUNCTURE: CPT

## 2020-07-20 PROCEDURE — 86140 C-REACTIVE PROTEIN: CPT

## 2020-07-20 PROCEDURE — 81001 URINALYSIS AUTO W/SCOPE: CPT

## 2020-07-20 PROCEDURE — 82570 ASSAY OF URINE CREATININE: CPT

## 2020-07-20 PROCEDURE — 80053 COMPREHEN METABOLIC PANEL: CPT

## 2020-07-20 PROCEDURE — 85025 COMPLETE CBC W/AUTO DIFF WBC: CPT

## 2020-07-20 PROCEDURE — 87077 CULTURE AEROBIC IDENTIFY: CPT

## 2020-07-20 PROCEDURE — 84156 ASSAY OF PROTEIN URINE: CPT

## 2020-07-20 PROCEDURE — 85651 RBC SED RATE NONAUTOMATED: CPT

## 2020-07-23 LAB
CULTURE: ABNORMAL
Lab: ABNORMAL
SPECIMEN DESCRIPTION: ABNORMAL

## 2020-08-28 ENCOUNTER — HOSPITAL ENCOUNTER (OUTPATIENT)
Age: 75
Discharge: HOME | End: 2020-08-28
Payer: MEDICARE

## 2020-08-28 LAB
-: ABNORMAL
ABSOLUTE EOS #: 0 K/UL (ref 0–0.4)
ABSOLUTE IMMATURE GRANULOCYTE: ABNORMAL K/UL (ref 0–0.3)
ABSOLUTE LYMPH #: 0.7 K/UL (ref 1–4.8)
ABSOLUTE MONO #: 0.4 K/UL (ref 0.1–1.3)
ALBUMIN SERPL-MCNC: 3.8 G/DL (ref 3.5–5.2)
ALBUMIN/GLOBULIN RATIO: ABNORMAL (ref 1–2.5)
ALP BLD-CCNC: 68 U/L (ref 35–104)
ALT SERPL-CCNC: 12 U/L (ref 5–33)
AMORPHOUS: ABNORMAL
ANION GAP SERPL CALCULATED.3IONS-SCNC: 10 MMOL/L (ref 9–17)
AST SERPL-CCNC: 21 U/L
BACTERIA: ABNORMAL
BASOPHILS # BLD: 1 % (ref 0–2)
BASOPHILS ABSOLUTE: 0 K/UL (ref 0–0.2)
BILIRUB SERPL-MCNC: 0.31 MG/DL (ref 0.3–1.2)
BILIRUBIN URINE: NEGATIVE
BUN BLDV-MCNC: 27 MG/DL (ref 8–23)
BUN/CREAT BLD: ABNORMAL (ref 9–20)
C-REACTIVE PROTEIN: 5 MG/L (ref 0–5)
CALCIUM SERPL-MCNC: 8.9 MG/DL (ref 8.6–10.4)
CASTS UA: ABNORMAL /LPF
CHLORIDE BLD-SCNC: 106 MMOL/L (ref 98–107)
CO2: 19 MMOL/L (ref 20–31)
COLOR: YELLOW
COMMENT UA: ABNORMAL
COMPLEMENT C3: 87 MG/DL (ref 90–180)
COMPLEMENT C4: 18 MG/DL (ref 10–40)
CREAT SERPL-MCNC: 1.34 MG/DL (ref 0.5–0.9)
CREATININE URINE: 68.6 MG/DL (ref 28–217)
CREATININE URINE: 71.3 MG/DL (ref 28–217)
CRYSTALS, UA: ABNORMAL /HPF
DIFFERENTIAL TYPE: ABNORMAL
EOSINOPHILS RELATIVE PERCENT: 1 % (ref 0–4)
EPITHELIAL CELLS UA: ABNORMAL /HPF
GFR AFRICAN AMERICAN: 47 ML/MIN
GFR NON-AFRICAN AMERICAN: 39 ML/MIN
GFR SERPL CREATININE-BSD FRML MDRD: ABNORMAL ML/MIN/{1.73_M2}
GFR SERPL CREATININE-BSD FRML MDRD: ABNORMAL ML/MIN/{1.73_M2}
GLUCOSE BLD-MCNC: 80 MG/DL (ref 70–99)
GLUCOSE URINE: NEGATIVE
HCT VFR BLD CALC: 40.5 % (ref 36–46)
HEMOGLOBIN: 13.3 G/DL (ref 12–16)
IMMATURE GRANULOCYTES: ABNORMAL %
KETONES, URINE: NEGATIVE
LEUKOCYTE ESTERASE, URINE: ABNORMAL
LYMPHOCYTES # BLD: 17 % (ref 24–44)
MCH RBC QN AUTO: 32.1 PG (ref 26–34)
MCHC RBC AUTO-ENTMCNC: 32.7 G/DL (ref 31–37)
MCV RBC AUTO: 97.9 FL (ref 80–100)
MONOCYTES # BLD: 11 % (ref 1–7)
MUCUS: ABNORMAL
NITRITE, URINE: NEGATIVE
NRBC AUTOMATED: ABNORMAL PER 100 WBC
OTHER OBSERVATIONS UA: ABNORMAL
PDW BLD-RTO: 14.7 % (ref 11.5–14.9)
PH UA: 6.5 (ref 5–8)
PLATELET # BLD: 186 K/UL (ref 150–450)
PLATELET ESTIMATE: ABNORMAL
PMV BLD AUTO: 7.6 FL (ref 6–12)
POTASSIUM SERPL-SCNC: 4.6 MMOL/L (ref 3.7–5.3)
PROTEIN UA: ABNORMAL
RBC # BLD: 4.14 M/UL (ref 4–5.2)
RBC # BLD: ABNORMAL 10*6/UL
RBC UA: ABNORMAL /HPF
RENAL EPITHELIAL, UA: ABNORMAL /HPF
SEDIMENTATION RATE, ERYTHROCYTE: 36 MM (ref 0–20)
SEG NEUTROPHILS: 70 % (ref 36–66)
SEGMENTED NEUTROPHILS ABSOLUTE COUNT: 2.8 K/UL (ref 1.3–9.1)
SODIUM BLD-SCNC: 135 MMOL/L (ref 135–144)
SPECIFIC GRAVITY UA: 1.01 (ref 1–1.03)
TOTAL PROTEIN, URINE: 22 MG/DL
TOTAL PROTEIN: 6.9 G/DL (ref 6.4–8.3)
TRICHOMONAS: ABNORMAL
TURBIDITY: CLEAR
URINE HGB: NEGATIVE
URINE TOTAL PROTEIN CREATININE RATIO: 0.31 (ref 0–0.2)
UROBILINOGEN, URINE: NORMAL
WBC # BLD: 4 K/UL (ref 3.5–11)
WBC # BLD: ABNORMAL 10*3/UL
WBC UA: ABNORMAL /HPF
YEAST: ABNORMAL

## 2020-08-28 PROCEDURE — 87186 SC STD MICRODIL/AGAR DIL: CPT

## 2020-08-28 PROCEDURE — 82570 ASSAY OF URINE CREATININE: CPT

## 2020-08-28 PROCEDURE — 87086 URINE CULTURE/COLONY COUNT: CPT

## 2020-08-28 PROCEDURE — 85651 RBC SED RATE NONAUTOMATED: CPT

## 2020-08-28 PROCEDURE — 81001 URINALYSIS AUTO W/SCOPE: CPT

## 2020-08-28 PROCEDURE — 80053 COMPREHEN METABOLIC PANEL: CPT

## 2020-08-28 PROCEDURE — 85025 COMPLETE CBC W/AUTO DIFF WBC: CPT

## 2020-08-28 PROCEDURE — 87088 URINE BACTERIA CULTURE: CPT

## 2020-08-28 PROCEDURE — 36415 COLL VENOUS BLD VENIPUNCTURE: CPT

## 2020-08-28 PROCEDURE — 86140 C-REACTIVE PROTEIN: CPT

## 2020-08-28 PROCEDURE — 84156 ASSAY OF PROTEIN URINE: CPT

## 2020-08-28 PROCEDURE — 86160 COMPLEMENT ANTIGEN: CPT

## 2020-08-28 PROCEDURE — 87077 CULTURE AEROBIC IDENTIFY: CPT

## 2020-08-31 LAB
CULTURE: ABNORMAL
CULTURE: ABNORMAL
Lab: ABNORMAL
SPECIMEN DESCRIPTION: ABNORMAL

## 2020-09-14 ENCOUNTER — HOSPITAL ENCOUNTER (OUTPATIENT)
Age: 75
Discharge: HOME | End: 2020-09-14
Payer: MEDICARE

## 2020-09-14 LAB
-: NORMAL
-: NORMAL
ABSOLUTE EOS #: 0 K/UL (ref 0–0.4)
ABSOLUTE IMMATURE GRANULOCYTE: ABNORMAL K/UL (ref 0–0.3)
ABSOLUTE LYMPH #: 0.6 K/UL (ref 1–4.8)
ABSOLUTE MONO #: 0.5 K/UL (ref 0.1–1.3)
AMORPHOUS: NORMAL
BACTERIA: NORMAL
BASOPHILS # BLD: 1 % (ref 0–2)
BASOPHILS ABSOLUTE: 0 K/UL (ref 0–0.2)
BILIRUBIN URINE: NEGATIVE
CASTS UA: NORMAL /LPF
COLOR: YELLOW
COMMENT UA: ABNORMAL
COMPLEMENT C3: 112 MG/DL (ref 90–180)
COMPLEMENT C4: 24 MG/DL (ref 10–40)
CRYSTALS, UA: NORMAL /HPF
DIFFERENTIAL TYPE: ABNORMAL
EOSINOPHILS RELATIVE PERCENT: 1 % (ref 0–4)
EPITHELIAL CELLS UA: NORMAL /HPF
GLUCOSE URINE: NEGATIVE
HCT VFR BLD CALC: 37.3 % (ref 36–46)
HEMOGLOBIN: 12.8 G/DL (ref 12–16)
IMMATURE GRANULOCYTES: ABNORMAL %
KETONES, URINE: NEGATIVE
LEUKOCYTE ESTERASE, URINE: ABNORMAL
LYMPHOCYTES # BLD: 13 % (ref 24–44)
MCH RBC QN AUTO: 32.9 PG (ref 26–34)
MCHC RBC AUTO-ENTMCNC: 34.4 G/DL (ref 31–37)
MCV RBC AUTO: 95.7 FL (ref 80–100)
MONOCYTES # BLD: 11 % (ref 1–7)
MUCUS: NORMAL
NITRITE, URINE: NEGATIVE
NRBC AUTOMATED: ABNORMAL PER 100 WBC
OTHER OBSERVATIONS UA: NORMAL
PDW BLD-RTO: 14.3 % (ref 11.5–14.9)
PH UA: 6.5 (ref 5–8)
PLATELET # BLD: 273 K/UL (ref 150–450)
PLATELET ESTIMATE: ABNORMAL
PMV BLD AUTO: 7.4 FL (ref 6–12)
PROTEIN UA: ABNORMAL
RBC # BLD: 3.9 M/UL (ref 4–5.2)
RBC # BLD: ABNORMAL 10*6/UL
RBC UA: NORMAL /HPF
REASON FOR REJECTION: NORMAL
RENAL EPITHELIAL, UA: NORMAL /HPF
SEDIMENTATION RATE, ERYTHROCYTE: 72 MM (ref 0–20)
SEG NEUTROPHILS: 74 % (ref 36–66)
SEGMENTED NEUTROPHILS ABSOLUTE COUNT: 3.6 K/UL (ref 1.3–9.1)
SPECIFIC GRAVITY UA: 1.01 (ref 1–1.03)
TOTAL PROTEIN, URINE: 38 MG/DL
TRICHOMONAS: NORMAL
TURBIDITY: ABNORMAL
URINE HGB: ABNORMAL
UROBILINOGEN, URINE: NORMAL
WBC # BLD: 4.7 K/UL (ref 3.5–11)
WBC # BLD: ABNORMAL 10*3/UL
WBC UA: NORMAL /HPF
YEAST: NORMAL
ZZ NTE CLEAN UP: ORDERED TEST: NORMAL
ZZ NTE WITH NAME CLEAN UP: SPECIMEN SOURCE: NORMAL

## 2020-09-14 PROCEDURE — 86160 COMPLEMENT ANTIGEN: CPT

## 2020-09-14 PROCEDURE — 81001 URINALYSIS AUTO W/SCOPE: CPT

## 2020-09-14 PROCEDURE — 84156 ASSAY OF PROTEIN URINE: CPT

## 2020-09-14 PROCEDURE — 85651 RBC SED RATE NONAUTOMATED: CPT

## 2020-09-14 PROCEDURE — 36415 COLL VENOUS BLD VENIPUNCTURE: CPT

## 2020-09-14 PROCEDURE — 85025 COMPLETE CBC W/AUTO DIFF WBC: CPT

## 2020-11-11 ENCOUNTER — HOSPITAL ENCOUNTER (OUTPATIENT)
Age: 75
Discharge: HOME | End: 2020-11-11
Payer: MEDICARE

## 2020-11-11 LAB
-: NORMAL
ABSOLUTE EOS #: 0 K/UL (ref 0–0.4)
ABSOLUTE IMMATURE GRANULOCYTE: ABNORMAL K/UL (ref 0–0.3)
ABSOLUTE LYMPH #: 0.7 K/UL (ref 1–4.8)
ABSOLUTE MONO #: 0.4 K/UL (ref 0.1–1.3)
ALBUMIN SERPL-MCNC: 3.9 G/DL (ref 3.5–5.2)
ALBUMIN/GLOBULIN RATIO: ABNORMAL (ref 1–2.5)
ALP BLD-CCNC: 77 U/L (ref 35–104)
ALT SERPL-CCNC: 18 U/L (ref 5–33)
AMORPHOUS: NORMAL
ANION GAP SERPL CALCULATED.3IONS-SCNC: 10 MMOL/L (ref 9–17)
AST SERPL-CCNC: 27 U/L
BACTERIA: NORMAL
BASOPHILS # BLD: 1 % (ref 0–2)
BASOPHILS ABSOLUTE: 0 K/UL (ref 0–0.2)
BILIRUB SERPL-MCNC: 0.34 MG/DL (ref 0.3–1.2)
BILIRUBIN URINE: NEGATIVE
BUN BLDV-MCNC: 27 MG/DL (ref 8–23)
BUN/CREAT BLD: ABNORMAL (ref 9–20)
C-REACTIVE PROTEIN: 2.5 MG/L (ref 0–5)
CALCIUM SERPL-MCNC: 9.3 MG/DL (ref 8.6–10.4)
CASTS UA: NORMAL /LPF
CHLORIDE BLD-SCNC: 106 MMOL/L (ref 98–107)
CO2: 19 MMOL/L (ref 20–31)
COLOR: YELLOW
COMMENT UA: ABNORMAL
COMPLEMENT C3: 89 MG/DL (ref 90–180)
COMPLEMENT C4: 17 MG/DL (ref 10–40)
CREAT SERPL-MCNC: 1.42 MG/DL (ref 0.5–0.9)
CREATININE URINE: 130 MG/DL (ref 28–217)
CRYSTALS, UA: NORMAL /HPF
DIFFERENTIAL TYPE: ABNORMAL
EOSINOPHILS RELATIVE PERCENT: 1 % (ref 0–4)
EPITHELIAL CELLS UA: NORMAL /HPF
GFR AFRICAN AMERICAN: 44 ML/MIN
GFR NON-AFRICAN AMERICAN: 36 ML/MIN
GFR SERPL CREATININE-BSD FRML MDRD: ABNORMAL ML/MIN/{1.73_M2}
GFR SERPL CREATININE-BSD FRML MDRD: ABNORMAL ML/MIN/{1.73_M2}
GLUCOSE BLD-MCNC: 81 MG/DL (ref 70–99)
GLUCOSE URINE: NEGATIVE
HCT VFR BLD CALC: 39.2 % (ref 36–46)
HEMOGLOBIN: 12.9 G/DL (ref 12–16)
IMMATURE GRANULOCYTES: ABNORMAL %
KETONES, URINE: NEGATIVE
LEUKOCYTE ESTERASE, URINE: NEGATIVE
LYMPHOCYTES # BLD: 19 % (ref 24–44)
MCH RBC QN AUTO: 31.6 PG (ref 26–34)
MCHC RBC AUTO-ENTMCNC: 32.8 G/DL (ref 31–37)
MCV RBC AUTO: 96.2 FL (ref 80–100)
MONOCYTES # BLD: 11 % (ref 1–7)
MUCUS: NORMAL
NITRITE, URINE: NEGATIVE
NRBC AUTOMATED: ABNORMAL PER 100 WBC
OTHER OBSERVATIONS UA: NORMAL
PDW BLD-RTO: 14.6 % (ref 11.5–14.9)
PH UA: 6 (ref 5–8)
PLATELET # BLD: 172 K/UL (ref 150–450)
PLATELET ESTIMATE: ABNORMAL
PMV BLD AUTO: 7.7 FL (ref 6–12)
POTASSIUM SERPL-SCNC: 4.8 MMOL/L (ref 3.7–5.3)
PROTEIN UA: ABNORMAL
RBC # BLD: 4.07 M/UL (ref 4–5.2)
RBC # BLD: ABNORMAL 10*6/UL
RBC UA: NORMAL /HPF
RENAL EPITHELIAL, UA: NORMAL /HPF
SEDIMENTATION RATE, ERYTHROCYTE: 15 MM (ref 0–30)
SEG NEUTROPHILS: 68 % (ref 36–66)
SEGMENTED NEUTROPHILS ABSOLUTE COUNT: 2.5 K/UL (ref 1.3–9.1)
SODIUM BLD-SCNC: 135 MMOL/L (ref 135–144)
SPECIFIC GRAVITY UA: 1.01 (ref 1–1.03)
TOTAL PROTEIN, URINE: 23 MG/DL
TOTAL PROTEIN: 7 G/DL (ref 6.4–8.3)
TRICHOMONAS: NORMAL
TURBIDITY: CLEAR
URINE HGB: NEGATIVE
URINE TOTAL PROTEIN CREATININE RATIO: 0.18 (ref 0–0.2)
UROBILINOGEN, URINE: NORMAL
WBC # BLD: 3.7 K/UL (ref 3.5–11)
WBC # BLD: ABNORMAL 10*3/UL
WBC UA: NORMAL /HPF
YEAST: NORMAL

## 2020-11-11 PROCEDURE — 80053 COMPREHEN METABOLIC PANEL: CPT

## 2020-11-11 PROCEDURE — 36415 COLL VENOUS BLD VENIPUNCTURE: CPT

## 2020-11-11 PROCEDURE — 85025 COMPLETE CBC W/AUTO DIFF WBC: CPT

## 2020-11-11 PROCEDURE — 85652 RBC SED RATE AUTOMATED: CPT

## 2020-11-11 PROCEDURE — 86140 C-REACTIVE PROTEIN: CPT

## 2020-11-11 PROCEDURE — 84156 ASSAY OF PROTEIN URINE: CPT

## 2020-11-11 PROCEDURE — 86160 COMPLEMENT ANTIGEN: CPT

## 2020-11-11 PROCEDURE — 81001 URINALYSIS AUTO W/SCOPE: CPT

## 2020-11-11 PROCEDURE — 82570 ASSAY OF URINE CREATININE: CPT

## 2021-03-02 ENCOUNTER — HOSPITAL ENCOUNTER (OUTPATIENT)
Age: 76
Discharge: HOME | End: 2021-03-02
Payer: MEDICARE

## 2021-03-02 LAB
-: ABNORMAL
ABSOLUTE EOS #: 0 K/UL (ref 0–0.4)
ABSOLUTE IMMATURE GRANULOCYTE: ABNORMAL K/UL (ref 0–0.3)
ABSOLUTE LYMPH #: 0.5 K/UL (ref 1–4.8)
ABSOLUTE MONO #: 0.6 K/UL (ref 0.1–1.3)
ALBUMIN SERPL-MCNC: 3.5 G/DL (ref 3.5–5.2)
ALBUMIN/GLOBULIN RATIO: ABNORMAL (ref 1–2.5)
ALP BLD-CCNC: 75 U/L (ref 35–104)
ALT SERPL-CCNC: 8 U/L (ref 5–33)
AMORPHOUS: ABNORMAL
ANION GAP SERPL CALCULATED.3IONS-SCNC: 11 MMOL/L (ref 9–17)
AST SERPL-CCNC: 19 U/L
BACTERIA: ABNORMAL
BASOPHILS # BLD: 1 % (ref 0–2)
BASOPHILS ABSOLUTE: 0 K/UL (ref 0–0.2)
BILIRUB SERPL-MCNC: 0.34 MG/DL (ref 0.3–1.2)
BILIRUBIN URINE: NEGATIVE
BUN BLDV-MCNC: 29 MG/DL (ref 8–23)
BUN/CREAT BLD: ABNORMAL (ref 9–20)
C-REACTIVE PROTEIN: 56.9 MG/L (ref 0–5)
CALCIUM SERPL-MCNC: 9 MG/DL (ref 8.6–10.4)
CASTS UA: ABNORMAL /LPF
CHLORIDE BLD-SCNC: 110 MMOL/L (ref 98–107)
CO2: 18 MMOL/L (ref 20–31)
COLOR: YELLOW
COMMENT UA: ABNORMAL
COMPLEMENT C3: 94 MG/DL (ref 90–180)
COMPLEMENT C4: 16 MG/DL (ref 10–40)
CREAT SERPL-MCNC: 1.2 MG/DL (ref 0.5–0.9)
CREATININE URINE: 63.5 MG/DL (ref 28–217)
CRYSTALS, UA: ABNORMAL /HPF
DIFFERENTIAL TYPE: ABNORMAL
EOSINOPHILS RELATIVE PERCENT: 1 % (ref 0–4)
EPITHELIAL CELLS UA: ABNORMAL /HPF
GFR AFRICAN AMERICAN: 53 ML/MIN
GFR NON-AFRICAN AMERICAN: 44 ML/MIN
GFR SERPL CREATININE-BSD FRML MDRD: ABNORMAL ML/MIN/{1.73_M2}
GFR SERPL CREATININE-BSD FRML MDRD: ABNORMAL ML/MIN/{1.73_M2}
GLUCOSE BLD-MCNC: 90 MG/DL (ref 70–99)
GLUCOSE URINE: NEGATIVE
HCT VFR BLD CALC: 34.8 % (ref 36–46)
HEMOGLOBIN: 11.4 G/DL (ref 12–16)
IMMATURE GRANULOCYTES: ABNORMAL %
KETONES, URINE: NEGATIVE
LEUKOCYTE ESTERASE, URINE: ABNORMAL
LYMPHOCYTES # BLD: 9 % (ref 24–44)
MCH RBC QN AUTO: 32.7 PG (ref 26–34)
MCHC RBC AUTO-ENTMCNC: 32.7 G/DL (ref 31–37)
MCV RBC AUTO: 100.1 FL (ref 80–100)
MONOCYTES # BLD: 10 % (ref 1–7)
MUCUS: ABNORMAL
NITRITE, URINE: NEGATIVE
NRBC AUTOMATED: ABNORMAL PER 100 WBC
OTHER OBSERVATIONS UA: ABNORMAL
PDW BLD-RTO: 15.5 % (ref 11.5–14.9)
PH UA: 6.5 (ref 5–8)
PLATELET # BLD: 199 K/UL (ref 150–450)
PLATELET ESTIMATE: ABNORMAL
PMV BLD AUTO: 7.8 FL (ref 6–12)
POTASSIUM SERPL-SCNC: 4.8 MMOL/L (ref 3.7–5.3)
PROTEIN UA: ABNORMAL
RBC # BLD: 3.48 M/UL (ref 4–5.2)
RBC # BLD: ABNORMAL 10*6/UL
RBC UA: ABNORMAL /HPF
RENAL EPITHELIAL, UA: ABNORMAL /HPF
SEDIMENTATION RATE, ERYTHROCYTE: 9 MM (ref 0–30)
SEG NEUTROPHILS: 79 % (ref 36–66)
SEGMENTED NEUTROPHILS ABSOLUTE COUNT: 4.6 K/UL (ref 1.3–9.1)
SODIUM BLD-SCNC: 139 MMOL/L (ref 135–144)
SPECIFIC GRAVITY UA: 1.01 (ref 1–1.03)
TOTAL PROTEIN, URINE: 41 MG/DL
TOTAL PROTEIN: 6.6 G/DL (ref 6.4–8.3)
TRICHOMONAS: ABNORMAL
TURBIDITY: ABNORMAL
URINE HGB: NEGATIVE
URINE TOTAL PROTEIN CREATININE RATIO: 0.65 (ref 0–0.2)
UROBILINOGEN, URINE: NORMAL
WBC # BLD: 5.7 K/UL (ref 3.5–11)
WBC # BLD: ABNORMAL 10*3/UL
WBC UA: ABNORMAL /HPF
YEAST: ABNORMAL

## 2021-03-02 PROCEDURE — 81001 URINALYSIS AUTO W/SCOPE: CPT

## 2021-03-02 PROCEDURE — 80053 COMPREHEN METABOLIC PANEL: CPT

## 2021-03-02 PROCEDURE — 84156 ASSAY OF PROTEIN URINE: CPT

## 2021-03-02 PROCEDURE — 86140 C-REACTIVE PROTEIN: CPT

## 2021-03-02 PROCEDURE — 86160 COMPLEMENT ANTIGEN: CPT

## 2021-03-02 PROCEDURE — 82570 ASSAY OF URINE CREATININE: CPT

## 2021-03-02 PROCEDURE — 85025 COMPLETE CBC W/AUTO DIFF WBC: CPT

## 2021-03-02 PROCEDURE — 36415 COLL VENOUS BLD VENIPUNCTURE: CPT

## 2021-03-02 PROCEDURE — 85652 RBC SED RATE AUTOMATED: CPT

## 2021-05-06 ENCOUNTER — HOSPITAL ENCOUNTER (OUTPATIENT)
Age: 76
Discharge: HOME | End: 2021-05-06
Payer: MEDICARE

## 2021-05-06 LAB
-: ABNORMAL
ABSOLUTE EOS #: 0 K/UL (ref 0–0.4)
ABSOLUTE IMMATURE GRANULOCYTE: ABNORMAL K/UL (ref 0–0.3)
ABSOLUTE LYMPH #: 0.7 K/UL (ref 1–4.8)
ABSOLUTE MONO #: 0.4 K/UL (ref 0.1–1.3)
ALBUMIN SERPL-MCNC: 4 G/DL (ref 3.5–5.2)
ALBUMIN/GLOBULIN RATIO: ABNORMAL (ref 1–2.5)
ALP BLD-CCNC: 71 U/L (ref 35–104)
ALT SERPL-CCNC: 13 U/L (ref 5–33)
AMORPHOUS: ABNORMAL
ANION GAP SERPL CALCULATED.3IONS-SCNC: 13 MMOL/L (ref 9–17)
AST SERPL-CCNC: 24 U/L
BACTERIA: ABNORMAL
BASOPHILS # BLD: 1 % (ref 0–2)
BASOPHILS ABSOLUTE: 0 K/UL (ref 0–0.2)
BILIRUB SERPL-MCNC: 0.3 MG/DL (ref 0.3–1.2)
BILIRUBIN URINE: NEGATIVE
BUN BLDV-MCNC: 36 MG/DL (ref 8–23)
BUN/CREAT BLD: ABNORMAL (ref 9–20)
C-REACTIVE PROTEIN: 8.7 MG/L (ref 0–5)
CALCIUM SERPL-MCNC: 9.4 MG/DL (ref 8.6–10.4)
CASTS UA: ABNORMAL /LPF
CHLORIDE BLD-SCNC: 106 MMOL/L (ref 98–107)
CO2: 18 MMOL/L (ref 20–31)
COLOR: YELLOW
COMMENT UA: ABNORMAL
COMPLEMENT C3: 86 MG/DL (ref 90–180)
COMPLEMENT C4: 16 MG/DL (ref 10–40)
CREAT SERPL-MCNC: 1.48 MG/DL (ref 0.5–0.9)
CREATININE URINE: 106.3 MG/DL (ref 28–217)
CRYSTALS, UA: ABNORMAL /HPF
DIFFERENTIAL TYPE: ABNORMAL
EOSINOPHILS RELATIVE PERCENT: 1 % (ref 0–4)
EPITHELIAL CELLS UA: ABNORMAL /HPF
GFR AFRICAN AMERICAN: 42 ML/MIN
GFR NON-AFRICAN AMERICAN: 34 ML/MIN
GFR SERPL CREATININE-BSD FRML MDRD: ABNORMAL ML/MIN/{1.73_M2}
GFR SERPL CREATININE-BSD FRML MDRD: ABNORMAL ML/MIN/{1.73_M2}
GLUCOSE BLD-MCNC: 106 MG/DL (ref 70–99)
GLUCOSE URINE: NEGATIVE
HCT VFR BLD CALC: 36.5 % (ref 36–46)
HEMOGLOBIN: 11.6 G/DL (ref 12–16)
IMMATURE GRANULOCYTES: ABNORMAL %
KETONES, URINE: NEGATIVE
LEUKOCYTE ESTERASE, URINE: ABNORMAL
LYMPHOCYTES # BLD: 17 % (ref 24–44)
MCH RBC QN AUTO: 30.1 PG (ref 26–34)
MCHC RBC AUTO-ENTMCNC: 31.8 G/DL (ref 31–37)
MCV RBC AUTO: 94.7 FL (ref 80–100)
MONOCYTES # BLD: 9 % (ref 1–7)
MUCUS: ABNORMAL
NITRITE, URINE: NEGATIVE
NRBC AUTOMATED: ABNORMAL PER 100 WBC
OTHER OBSERVATIONS UA: ABNORMAL
PDW BLD-RTO: 14.6 % (ref 11.5–14.9)
PH UA: 6 (ref 5–8)
PLATELET # BLD: 206 K/UL (ref 150–450)
PLATELET ESTIMATE: ABNORMAL
PMV BLD AUTO: 7.4 FL (ref 6–12)
POTASSIUM SERPL-SCNC: 4.5 MMOL/L (ref 3.7–5.3)
PROTEIN UA: ABNORMAL
RBC # BLD: 3.85 M/UL (ref 4–5.2)
RBC # BLD: ABNORMAL 10*6/UL
RBC UA: ABNORMAL /HPF
RENAL EPITHELIAL, UA: ABNORMAL /HPF
SEDIMENTATION RATE, ERYTHROCYTE: 8 MM (ref 0–30)
SEG NEUTROPHILS: 72 % (ref 36–66)
SEGMENTED NEUTROPHILS ABSOLUTE COUNT: 3.1 K/UL (ref 1.3–9.1)
SODIUM BLD-SCNC: 137 MMOL/L (ref 135–144)
SPECIFIC GRAVITY UA: 1.02 (ref 1–1.03)
TOTAL PROTEIN, URINE: 34 MG/DL
TOTAL PROTEIN: 7 G/DL (ref 6.4–8.3)
TRICHOMONAS: ABNORMAL
TURBIDITY: CLEAR
URINE HGB: NEGATIVE
URINE TOTAL PROTEIN CREATININE RATIO: 0.32 (ref 0–0.2)
UROBILINOGEN, URINE: NORMAL
WBC # BLD: 4.3 K/UL (ref 3.5–11)
WBC # BLD: ABNORMAL 10*3/UL
WBC UA: ABNORMAL /HPF
YEAST: ABNORMAL

## 2021-05-06 PROCEDURE — 85652 RBC SED RATE AUTOMATED: CPT

## 2021-05-06 PROCEDURE — 82570 ASSAY OF URINE CREATININE: CPT

## 2021-05-06 PROCEDURE — 84156 ASSAY OF PROTEIN URINE: CPT

## 2021-05-06 PROCEDURE — 86140 C-REACTIVE PROTEIN: CPT

## 2021-05-06 PROCEDURE — 85025 COMPLETE CBC W/AUTO DIFF WBC: CPT

## 2021-05-06 PROCEDURE — 36415 COLL VENOUS BLD VENIPUNCTURE: CPT

## 2021-05-06 PROCEDURE — 86160 COMPLEMENT ANTIGEN: CPT

## 2021-05-06 PROCEDURE — 81001 URINALYSIS AUTO W/SCOPE: CPT

## 2021-05-06 PROCEDURE — 80053 COMPREHEN METABOLIC PANEL: CPT

## 2021-06-22 ENCOUNTER — HOSPITAL ENCOUNTER (OUTPATIENT)
Age: 76
Discharge: HOME | End: 2021-06-22
Payer: MEDICARE

## 2021-06-22 LAB
-: ABNORMAL
ABSOLUTE BANDS #: 0.03 K/UL (ref 0–1)
ABSOLUTE EOS #: 0.12 K/UL (ref 0–0.4)
ABSOLUTE IMMATURE GRANULOCYTE: ABNORMAL K/UL (ref 0–0.3)
ABSOLUTE LYMPH #: 0.31 K/UL (ref 1–4.8)
ABSOLUTE MONO #: 0.31 K/UL (ref 0.1–1.3)
ALBUMIN SERPL-MCNC: 3.6 G/DL (ref 3.5–5.2)
ALBUMIN SERPL-MCNC: 3.6 G/DL (ref 3.5–5.2)
ALBUMIN/GLOBULIN RATIO: ABNORMAL (ref 1–2.5)
ALBUMIN/GLOBULIN RATIO: NORMAL (ref 1–2.5)
ALP BLD-CCNC: 74 U/L (ref 35–104)
ALP BLD-CCNC: 76 U/L (ref 35–104)
ALT SERPL-CCNC: 11 U/L (ref 5–33)
ALT SERPL-CCNC: 11 U/L (ref 5–33)
AMORPHOUS: ABNORMAL
ANION GAP SERPL CALCULATED.3IONS-SCNC: 9 MMOL/L (ref 9–17)
AST SERPL-CCNC: 19 U/L
AST SERPL-CCNC: 20 U/L
BACTERIA: ABNORMAL
BANDS: 1 % (ref 0–10)
BASOPHILS # BLD: 0 % (ref 0–2)
BASOPHILS ABSOLUTE: 0 K/UL (ref 0–0.2)
BILIRUB SERPL-MCNC: 0.34 MG/DL (ref 0.3–1.2)
BILIRUB SERPL-MCNC: 0.35 MG/DL (ref 0.3–1.2)
BILIRUBIN DIRECT: 0.16 MG/DL
BILIRUBIN URINE: NEGATIVE
BILIRUBIN, INDIRECT: 0.18 MG/DL (ref 0–1)
BUN BLDV-MCNC: 21 MG/DL (ref 8–23)
BUN/CREAT BLD: ABNORMAL (ref 9–20)
C-REACTIVE PROTEIN: 30 MG/L (ref 0–5)
CALCIUM SERPL-MCNC: 8.7 MG/DL (ref 8.6–10.4)
CASTS UA: ABNORMAL /LPF
CHLORIDE BLD-SCNC: 103 MMOL/L (ref 98–107)
CO2: 25 MMOL/L (ref 20–31)
COLOR: YELLOW
COMMENT UA: ABNORMAL
COMPLEMENT C3: 88 MG/DL (ref 90–180)
COMPLEMENT C4: 17 MG/DL (ref 10–40)
CREAT SERPL-MCNC: 1.29 MG/DL (ref 0.5–0.9)
CRYSTALS, UA: ABNORMAL /HPF
DIFFERENTIAL TYPE: ABNORMAL
EOSINOPHILS RELATIVE PERCENT: 4 % (ref 0–4)
EPITHELIAL CELLS UA: ABNORMAL /HPF
GFR AFRICAN AMERICAN: 49 ML/MIN
GFR NON-AFRICAN AMERICAN: 40 ML/MIN
GFR SERPL CREATININE-BSD FRML MDRD: ABNORMAL ML/MIN/{1.73_M2}
GFR SERPL CREATININE-BSD FRML MDRD: ABNORMAL ML/MIN/{1.73_M2}
GLOBULIN: NORMAL G/DL (ref 1.5–3.8)
GLUCOSE BLD-MCNC: 87 MG/DL (ref 70–99)
GLUCOSE URINE: NEGATIVE
HCT VFR BLD CALC: 35.1 % (ref 36–46)
HEMOGLOBIN: 11.4 G/DL (ref 12–16)
IMMATURE GRANULOCYTES: ABNORMAL %
KETONES, URINE: NEGATIVE
LEUKOCYTE ESTERASE, URINE: ABNORMAL
LYMPHOCYTES # BLD: 10 % (ref 24–44)
MCH RBC QN AUTO: 30.2 PG (ref 26–34)
MCHC RBC AUTO-ENTMCNC: 32.5 G/DL (ref 31–37)
MCV RBC AUTO: 93 FL (ref 80–100)
METAMYELOCYTES ABSOLUTE COUNT: 0.03 K/UL
METAMYELOCYTES: 1 %
MONOCYTES # BLD: 10 % (ref 1–7)
MORPHOLOGY: ABNORMAL
MORPHOLOGY: ABNORMAL
MUCUS: ABNORMAL
NITRITE, URINE: NEGATIVE
NRBC AUTOMATED: ABNORMAL PER 100 WBC
OTHER OBSERVATIONS UA: ABNORMAL
PDW BLD-RTO: 17.4 % (ref 11.5–14.9)
PH UA: 7 (ref 5–8)
PLATELET # BLD: 204 K/UL (ref 150–450)
PLATELET ESTIMATE: ABNORMAL
PMV BLD AUTO: 7.3 FL (ref 6–12)
POTASSIUM SERPL-SCNC: 5.2 MMOL/L (ref 3.7–5.3)
PROTEIN UA: ABNORMAL
RBC # BLD: 3.78 M/UL (ref 4–5.2)
RBC # BLD: ABNORMAL 10*6/UL
RBC UA: ABNORMAL /HPF
RENAL EPITHELIAL, UA: ABNORMAL /HPF
SEDIMENTATION RATE, ERYTHROCYTE: 15 MM (ref 0–30)
SEG NEUTROPHILS: 74 % (ref 36–66)
SEGMENTED NEUTROPHILS ABSOLUTE COUNT: 2.3 K/UL (ref 1.3–9.1)
SODIUM BLD-SCNC: 137 MMOL/L (ref 135–144)
SPECIFIC GRAVITY UA: 1.01 (ref 1–1.03)
TOTAL CK: 107 U/L (ref 26–192)
TOTAL PROTEIN, URINE: 42 MG/DL
TOTAL PROTEIN: 6.6 G/DL (ref 6.4–8.3)
TOTAL PROTEIN: 6.7 G/DL (ref 6.4–8.3)
TRICHOMONAS: ABNORMAL
TURBIDITY: ABNORMAL
URINE HGB: NEGATIVE
UROBILINOGEN, URINE: NORMAL
WBC # BLD: 3.1 K/UL (ref 3.5–11)
WBC # BLD: ABNORMAL 10*3/UL
WBC UA: ABNORMAL /HPF
YEAST: ABNORMAL

## 2021-06-22 PROCEDURE — 86160 COMPLEMENT ANTIGEN: CPT

## 2021-06-22 PROCEDURE — 86140 C-REACTIVE PROTEIN: CPT

## 2021-06-22 PROCEDURE — 84156 ASSAY OF PROTEIN URINE: CPT

## 2021-06-22 PROCEDURE — 80076 HEPATIC FUNCTION PANEL: CPT

## 2021-06-22 PROCEDURE — 36415 COLL VENOUS BLD VENIPUNCTURE: CPT

## 2021-06-22 PROCEDURE — 80053 COMPREHEN METABOLIC PANEL: CPT

## 2021-06-22 PROCEDURE — 85652 RBC SED RATE AUTOMATED: CPT

## 2021-06-22 PROCEDURE — 85025 COMPLETE CBC W/AUTO DIFF WBC: CPT

## 2021-06-22 PROCEDURE — 82550 ASSAY OF CK (CPK): CPT

## 2021-06-22 PROCEDURE — 81001 URINALYSIS AUTO W/SCOPE: CPT

## 2021-08-16 ENCOUNTER — HOSPITAL ENCOUNTER (OUTPATIENT)
Age: 76
Discharge: HOME | End: 2021-08-16
Payer: MEDICARE

## 2021-08-16 LAB
-: ABNORMAL
ABSOLUTE EOS #: 0.1 K/UL (ref 0–0.4)
ABSOLUTE IMMATURE GRANULOCYTE: ABNORMAL K/UL (ref 0–0.3)
ABSOLUTE LYMPH #: 0.6 K/UL (ref 1–4.8)
ABSOLUTE MONO #: 0.3 K/UL (ref 0.1–1.3)
ALBUMIN SERPL-MCNC: 3.9 G/DL (ref 3.5–5.2)
ALBUMIN/GLOBULIN RATIO: ABNORMAL (ref 1–2.5)
ALP BLD-CCNC: 88 U/L (ref 35–104)
ALT SERPL-CCNC: 7 U/L (ref 5–33)
AMORPHOUS: ABNORMAL
ANION GAP SERPL CALCULATED.3IONS-SCNC: 11 MMOL/L (ref 9–17)
AST SERPL-CCNC: 20 U/L
BACTERIA: ABNORMAL
BASOPHILS # BLD: 1 % (ref 0–2)
BASOPHILS ABSOLUTE: 0 K/UL (ref 0–0.2)
BILIRUB SERPL-MCNC: 0.3 MG/DL (ref 0.3–1.2)
BILIRUBIN URINE: NEGATIVE
BUN BLDV-MCNC: 20 MG/DL (ref 8–23)
BUN/CREAT BLD: ABNORMAL (ref 9–20)
C-REACTIVE PROTEIN: 6.6 MG/L (ref 0–5)
CALCIUM SERPL-MCNC: 8.8 MG/DL (ref 8.6–10.4)
CASTS UA: ABNORMAL /LPF
CHLORIDE BLD-SCNC: 107 MMOL/L (ref 98–107)
CO2: 18 MMOL/L (ref 20–31)
COLOR: YELLOW
COMMENT UA: ABNORMAL
COMPLEMENT C3: 90 MG/DL (ref 90–180)
COMPLEMENT C4: 17 MG/DL (ref 10–40)
CREAT SERPL-MCNC: 1.48 MG/DL (ref 0.5–0.9)
CREATININE URINE: 83.8 MG/DL (ref 28–217)
CRYSTALS, UA: ABNORMAL /HPF
DIFFERENTIAL TYPE: ABNORMAL
EOSINOPHILS RELATIVE PERCENT: 2 % (ref 0–4)
EPITHELIAL CELLS UA: ABNORMAL /HPF
GFR AFRICAN AMERICAN: 42 ML/MIN
GFR NON-AFRICAN AMERICAN: 34 ML/MIN
GFR SERPL CREATININE-BSD FRML MDRD: ABNORMAL ML/MIN/{1.73_M2}
GFR SERPL CREATININE-BSD FRML MDRD: ABNORMAL ML/MIN/{1.73_M2}
GLUCOSE BLD-MCNC: 101 MG/DL (ref 70–99)
GLUCOSE URINE: NEGATIVE
HCT VFR BLD CALC: 39.1 % (ref 36–46)
HEMOGLOBIN: 12.3 G/DL (ref 12–16)
IMMATURE GRANULOCYTES: ABNORMAL %
KETONES, URINE: NEGATIVE
LEUKOCYTE ESTERASE, URINE: ABNORMAL
LYMPHOCYTES # BLD: 12 % (ref 24–44)
MCH RBC QN AUTO: 30.2 PG (ref 26–34)
MCHC RBC AUTO-ENTMCNC: 31.5 G/DL (ref 31–37)
MCV RBC AUTO: 95.8 FL (ref 80–100)
MONOCYTES # BLD: 6 % (ref 1–7)
MUCUS: ABNORMAL
NITRITE, URINE: NEGATIVE
NRBC AUTOMATED: ABNORMAL PER 100 WBC
OTHER OBSERVATIONS UA: ABNORMAL
PDW BLD-RTO: 17 % (ref 11.5–14.9)
PH UA: 6.5 (ref 5–8)
PLATELET # BLD: 189 K/UL (ref 150–450)
PLATELET ESTIMATE: ABNORMAL
PMV BLD AUTO: 7.2 FL (ref 6–12)
POTASSIUM SERPL-SCNC: 4.3 MMOL/L (ref 3.7–5.3)
PROTEIN UA: ABNORMAL
RBC # BLD: 4.08 M/UL (ref 4–5.2)
RBC # BLD: ABNORMAL 10*6/UL
RBC UA: ABNORMAL /HPF
RENAL EPITHELIAL, UA: ABNORMAL /HPF
SEDIMENTATION RATE, ERYTHROCYTE: 27 MM (ref 0–30)
SEG NEUTROPHILS: 79 % (ref 36–66)
SEGMENTED NEUTROPHILS ABSOLUTE COUNT: 3.7 K/UL (ref 1.3–9.1)
SODIUM BLD-SCNC: 136 MMOL/L (ref 135–144)
SPECIFIC GRAVITY UA: 1.01 (ref 1–1.03)
TOTAL PROTEIN, URINE: 49 MG/DL
TOTAL PROTEIN: 7.4 G/DL (ref 6.4–8.3)
TRICHOMONAS: ABNORMAL
TURBIDITY: CLEAR
URINE HGB: NEGATIVE
URINE TOTAL PROTEIN CREATININE RATIO: 0.58 (ref 0–0.2)
UROBILINOGEN, URINE: NORMAL
WBC # BLD: 4.6 K/UL (ref 3.5–11)
WBC # BLD: ABNORMAL 10*3/UL
WBC UA: ABNORMAL /HPF
YEAST: ABNORMAL

## 2021-08-16 PROCEDURE — 86140 C-REACTIVE PROTEIN: CPT

## 2021-08-16 PROCEDURE — 85025 COMPLETE CBC W/AUTO DIFF WBC: CPT

## 2021-08-16 PROCEDURE — 80053 COMPREHEN METABOLIC PANEL: CPT

## 2021-08-16 PROCEDURE — 84156 ASSAY OF PROTEIN URINE: CPT

## 2021-08-16 PROCEDURE — 36415 COLL VENOUS BLD VENIPUNCTURE: CPT

## 2021-08-16 PROCEDURE — 81001 URINALYSIS AUTO W/SCOPE: CPT

## 2021-08-16 PROCEDURE — 86160 COMPLEMENT ANTIGEN: CPT

## 2021-08-16 PROCEDURE — 82570 ASSAY OF URINE CREATININE: CPT

## 2021-08-16 PROCEDURE — 85652 RBC SED RATE AUTOMATED: CPT

## 2021-11-01 ENCOUNTER — HOSPITAL ENCOUNTER (OUTPATIENT)
Age: 76
Discharge: HOME | End: 2021-11-01
Payer: MEDICARE

## 2021-11-01 LAB
-: ABNORMAL
ABSOLUTE BANDS #: 1.78 K/UL (ref 0–1)
ABSOLUTE EOS #: 0 K/UL (ref 0–0.4)
ABSOLUTE IMMATURE GRANULOCYTE: ABNORMAL K/UL (ref 0–0.3)
ABSOLUTE LYMPH #: 0.33 K/UL (ref 1–4.8)
ABSOLUTE MONO #: 0.67 K/UL (ref 0.1–1.3)
ALBUMIN SERPL-MCNC: 3.9 G/DL (ref 3.5–5.2)
ALBUMIN/GLOBULIN RATIO: ABNORMAL (ref 1–2.5)
ALP BLD-CCNC: 99 U/L (ref 35–104)
ALT SERPL-CCNC: 13 U/L (ref 5–33)
AMORPHOUS: ABNORMAL
ANION GAP SERPL CALCULATED.3IONS-SCNC: 16 MMOL/L (ref 9–17)
AST SERPL-CCNC: 23 U/L
BACTERIA: ABNORMAL
BANDS: 16 % (ref 0–10)
BASOPHILS # BLD: 0 % (ref 0–2)
BASOPHILS ABSOLUTE: 0 K/UL (ref 0–0.2)
BILIRUB SERPL-MCNC: 0.22 MG/DL (ref 0.3–1.2)
BILIRUBIN URINE: NEGATIVE
BUN BLDV-MCNC: 42 MG/DL (ref 8–23)
BUN/CREAT BLD: ABNORMAL (ref 9–20)
C-REACTIVE PROTEIN: 256.2 MG/L (ref 0–5)
CALCIUM SERPL-MCNC: 9.3 MG/DL (ref 8.6–10.4)
CASTS UA: ABNORMAL /LPF
CHLORIDE BLD-SCNC: 101 MMOL/L (ref 98–107)
CO2: 14 MMOL/L (ref 20–31)
COLOR: YELLOW
COMMENT UA: ABNORMAL
COMPLEMENT C3: 98 MG/DL (ref 90–180)
COMPLEMENT C4: 23 MG/DL (ref 10–40)
CREAT SERPL-MCNC: 2.52 MG/DL (ref 0.5–0.9)
CRYSTALS, UA: ABNORMAL /HPF
DIFFERENTIAL TYPE: ABNORMAL
EOSINOPHILS RELATIVE PERCENT: 0 % (ref 0–4)
EPITHELIAL CELLS UA: ABNORMAL /HPF
GFR AFRICAN AMERICAN: 23 ML/MIN
GFR NON-AFRICAN AMERICAN: 19 ML/MIN
GFR SERPL CREATININE-BSD FRML MDRD: ABNORMAL ML/MIN/{1.73_M2}
GFR SERPL CREATININE-BSD FRML MDRD: ABNORMAL ML/MIN/{1.73_M2}
GLUCOSE BLD-MCNC: 103 MG/DL (ref 70–99)
GLUCOSE URINE: NEGATIVE
HCT VFR BLD CALC: 37.5 % (ref 36–46)
HEMOGLOBIN: 12.4 G/DL (ref 12–16)
IMMATURE GRANULOCYTES: ABNORMAL %
KETONES, URINE: NEGATIVE
LEUKOCYTE ESTERASE, URINE: ABNORMAL
LYMPHOCYTES # BLD: 3 % (ref 24–44)
MCH RBC QN AUTO: 29.8 PG (ref 26–34)
MCHC RBC AUTO-ENTMCNC: 33 G/DL (ref 31–37)
MCV RBC AUTO: 90.1 FL (ref 80–100)
MONOCYTES # BLD: 6 % (ref 1–7)
MORPHOLOGY: ABNORMAL
MUCUS: ABNORMAL
NITRITE, URINE: NEGATIVE
NRBC AUTOMATED: ABNORMAL PER 100 WBC
OTHER OBSERVATIONS UA: ABNORMAL
PDW BLD-RTO: 17.9 % (ref 11.5–14.9)
PH UA: 7 (ref 5–8)
PLATELET # BLD: 162 K/UL (ref 150–450)
PLATELET ESTIMATE: ABNORMAL
PMV BLD AUTO: 7.5 FL (ref 6–12)
POTASSIUM SERPL-SCNC: 3.9 MMOL/L (ref 3.7–5.3)
PROTEIN UA: ABNORMAL
RBC # BLD: 4.17 M/UL (ref 4–5.2)
RBC # BLD: ABNORMAL 10*6/UL
RBC UA: ABNORMAL /HPF
RENAL EPITHELIAL, UA: ABNORMAL /HPF
SEDIMENTATION RATE, ERYTHROCYTE: 21 MM (ref 0–30)
SEG NEUTROPHILS: 75 % (ref 36–66)
SEGMENTED NEUTROPHILS ABSOLUTE COUNT: 8.32 K/UL (ref 1.3–9.1)
SODIUM BLD-SCNC: 131 MMOL/L (ref 135–144)
SPECIFIC GRAVITY UA: 1.02 (ref 1–1.03)
TOTAL PROTEIN, URINE: 177 MG/DL
TOTAL PROTEIN: 7.5 G/DL (ref 6.4–8.3)
TRICHOMONAS: ABNORMAL
TURBIDITY: ABNORMAL
URINE HGB: ABNORMAL
UROBILINOGEN, URINE: NORMAL
WBC # BLD: 11.1 K/UL (ref 3.5–11)
WBC # BLD: ABNORMAL 10*3/UL
WBC UA: ABNORMAL /HPF
YEAST: ABNORMAL

## 2021-11-01 PROCEDURE — 36415 COLL VENOUS BLD VENIPUNCTURE: CPT

## 2021-11-01 PROCEDURE — 87086 URINE CULTURE/COLONY COUNT: CPT

## 2021-11-01 PROCEDURE — 80053 COMPREHEN METABOLIC PANEL: CPT

## 2021-11-01 PROCEDURE — 86160 COMPLEMENT ANTIGEN: CPT

## 2021-11-01 PROCEDURE — 86140 C-REACTIVE PROTEIN: CPT

## 2021-11-01 PROCEDURE — 85025 COMPLETE CBC W/AUTO DIFF WBC: CPT

## 2021-11-01 PROCEDURE — 85652 RBC SED RATE AUTOMATED: CPT

## 2021-11-01 PROCEDURE — 81001 URINALYSIS AUTO W/SCOPE: CPT

## 2021-11-01 PROCEDURE — 84156 ASSAY OF PROTEIN URINE: CPT

## 2021-11-02 LAB
CULTURE: ABNORMAL
Lab: ABNORMAL
SPECIMEN DESCRIPTION: ABNORMAL

## 2022-09-15 ENCOUNTER — HOSPITAL ENCOUNTER (INPATIENT)
Age: 77
LOS: 8 days | Discharge: HOME HEALTH | DRG: 560 | End: 2022-09-23
Attending: PHYSICAL MEDICINE & REHABILITATION | Admitting: PHYSICAL MEDICINE & REHABILITATION
Payer: MEDICARE

## 2022-09-15 DIAGNOSIS — S72.001S: Primary | ICD-10-CM

## 2022-09-15 PROCEDURE — 6370000000 HC RX 637 (ALT 250 FOR IP): Performed by: PHYSICAL MEDICINE & REHABILITATION

## 2022-09-15 PROCEDURE — 6370000000 HC RX 637 (ALT 250 FOR IP): Performed by: NURSE PRACTITIONER

## 2022-09-15 PROCEDURE — 1180000000 HC REHAB R&B

## 2022-09-15 RX ORDER — TRIAMCINOLONE ACETONIDE 0.25 MG/G
1 OINTMENT TOPICAL 2 TIMES DAILY
Status: ON HOLD | COMMUNITY
End: 2022-09-22 | Stop reason: HOSPADM

## 2022-09-15 RX ORDER — PROMETHAZINE HYDROCHLORIDE 25 MG/1
12.5 TABLET ORAL EVERY 8 HOURS PRN
Status: DISCONTINUED | OUTPATIENT
Start: 2022-09-15 | End: 2022-09-23 | Stop reason: HOSPADM

## 2022-09-15 RX ORDER — ASPIRIN 81 MG/1
81 TABLET, CHEWABLE ORAL DAILY
Status: DISCONTINUED | OUTPATIENT
Start: 2022-09-16 | End: 2022-09-23 | Stop reason: HOSPADM

## 2022-09-15 RX ORDER — GUAIFENESIN 600 MG/1
1200 TABLET, EXTENDED RELEASE ORAL DAILY
Status: DISCONTINUED | OUTPATIENT
Start: 2022-09-16 | End: 2022-09-23 | Stop reason: HOSPADM

## 2022-09-15 RX ORDER — PROMETHAZINE HYDROCHLORIDE 12.5 MG/1
12.5 TABLET ORAL EVERY 8 HOURS PRN
COMMUNITY

## 2022-09-15 RX ORDER — ASCORBIC ACID 500 MG
500 TABLET ORAL
Status: DISCONTINUED | OUTPATIENT
Start: 2022-09-15 | End: 2022-09-23 | Stop reason: HOSPADM

## 2022-09-15 RX ORDER — ASCORBIC ACID 500 MG
500 TABLET ORAL
Status: DISCONTINUED | OUTPATIENT
Start: 2022-09-19 | End: 2022-09-15

## 2022-09-15 RX ORDER — HYDROXYCHLOROQUINE SULFATE 200 MG/1
200 TABLET, FILM COATED ORAL DAILY
Status: DISCONTINUED | OUTPATIENT
Start: 2022-09-16 | End: 2022-09-15

## 2022-09-15 RX ORDER — LEVOTHYROXINE SODIUM 112 UG/1
112 TABLET ORAL DAILY
Status: DISCONTINUED | OUTPATIENT
Start: 2022-09-16 | End: 2022-09-23 | Stop reason: HOSPADM

## 2022-09-15 RX ORDER — CEPHALEXIN 500 MG/1
500 CAPSULE ORAL 2 TIMES DAILY
Status: ON HOLD | COMMUNITY
End: 2022-09-22 | Stop reason: HOSPADM

## 2022-09-15 RX ORDER — LACTOBACILLUS RHAMNOSUS GG 10B CELL
1 CAPSULE ORAL
Status: DISCONTINUED | OUTPATIENT
Start: 2022-09-15 | End: 2022-09-23 | Stop reason: HOSPADM

## 2022-09-15 RX ORDER — TRIAMCINOLONE ACETONIDE 0.25 MG/G
CREAM TOPICAL 2 TIMES DAILY
Status: DISCONTINUED | OUTPATIENT
Start: 2022-09-15 | End: 2022-09-15 | Stop reason: CLARIF

## 2022-09-15 RX ORDER — LACTOBACILLUS RHAMNOSUS GG 10B CELL
1 CAPSULE ORAL
Status: DISCONTINUED | OUTPATIENT
Start: 2022-09-15 | End: 2022-09-15

## 2022-09-15 RX ORDER — HYDROXYCHLOROQUINE SULFATE 200 MG/1
200 TABLET, FILM COATED ORAL DAILY
Status: DISCONTINUED | OUTPATIENT
Start: 2022-09-16 | End: 2022-09-23 | Stop reason: HOSPADM

## 2022-09-15 RX ORDER — TRIAMCINOLONE ACETONIDE 0.25 MG/G
CREAM TOPICAL 2 TIMES DAILY
Status: DISCONTINUED | OUTPATIENT
Start: 2022-09-15 | End: 2022-09-15

## 2022-09-15 RX ORDER — VITAMIN B COMPLEX
1000 TABLET ORAL DAILY
Status: DISCONTINUED | OUTPATIENT
Start: 2022-09-16 | End: 2022-09-23 | Stop reason: HOSPADM

## 2022-09-15 RX ORDER — SENNA PLUS 8.6 MG/1
2 TABLET ORAL DAILY PRN
Status: DISCONTINUED | OUTPATIENT
Start: 2022-09-15 | End: 2022-09-23 | Stop reason: HOSPADM

## 2022-09-15 RX ORDER — OXYCODONE HYDROCHLORIDE AND ACETAMINOPHEN 5; 325 MG/1; MG/1
1 TABLET ORAL EVERY 4 HOURS PRN
Status: DISPENSED | OUTPATIENT
Start: 2022-09-15 | End: 2022-09-18

## 2022-09-15 RX ORDER — CEPHALEXIN 500 MG/1
500 CAPSULE ORAL 2 TIMES DAILY
Status: DISCONTINUED | OUTPATIENT
Start: 2022-09-15 | End: 2022-09-15

## 2022-09-15 RX ORDER — CEPHALEXIN 500 MG/1
500 CAPSULE ORAL EVERY 12 HOURS SCHEDULED
Status: COMPLETED | OUTPATIENT
Start: 2022-09-15 | End: 2022-09-19

## 2022-09-15 RX ORDER — ACETAMINOPHEN 325 MG/1
650 TABLET ORAL EVERY 4 HOURS PRN
Status: DISCONTINUED | OUTPATIENT
Start: 2022-09-15 | End: 2022-09-23 | Stop reason: HOSPADM

## 2022-09-15 RX ORDER — PROMETHAZINE HYDROCHLORIDE 25 MG/1
12.5 TABLET ORAL EVERY 8 HOURS PRN
Status: DISCONTINUED | OUTPATIENT
Start: 2022-09-15 | End: 2022-09-15

## 2022-09-15 RX ORDER — BISACODYL 10 MG
10 SUPPOSITORY, RECTAL RECTAL DAILY PRN
Status: DISCONTINUED | OUTPATIENT
Start: 2022-09-15 | End: 2022-09-23 | Stop reason: HOSPADM

## 2022-09-15 RX ORDER — ASPIRIN 81 MG/1
81 TABLET, CHEWABLE ORAL DAILY
Status: DISCONTINUED | OUTPATIENT
Start: 2022-09-16 | End: 2022-09-15

## 2022-09-15 RX ORDER — PHENOL 1.4 %
650 AEROSOL, SPRAY (ML) MUCOUS MEMBRANE
COMMUNITY

## 2022-09-15 RX ORDER — LEVOTHYROXINE SODIUM 112 UG/1
112 TABLET ORAL DAILY
Status: DISCONTINUED | OUTPATIENT
Start: 2022-09-16 | End: 2022-09-15

## 2022-09-15 RX ORDER — POLYETHYLENE GLYCOL 3350 17 G/17G
17 POWDER, FOR SOLUTION ORAL DAILY
Status: DISCONTINUED | OUTPATIENT
Start: 2022-09-15 | End: 2022-09-23 | Stop reason: HOSPADM

## 2022-09-15 RX ORDER — GUAIFENESIN 600 MG/1
1200 TABLET, EXTENDED RELEASE ORAL DAILY
Status: DISCONTINUED | OUTPATIENT
Start: 2022-09-16 | End: 2022-09-15

## 2022-09-15 RX ORDER — CALCIUM CARBONATE 500(1250)
500 TABLET ORAL
Status: DISCONTINUED | OUTPATIENT
Start: 2022-09-15 | End: 2022-09-23 | Stop reason: HOSPADM

## 2022-09-15 RX ADMIN — CEPHALEXIN 500 MG: 500 CAPSULE ORAL at 19:43

## 2022-09-15 RX ADMIN — OXYCODONE HYDROCHLORIDE AND ACETAMINOPHEN 1 TABLET: 5; 325 TABLET ORAL at 19:43

## 2022-09-15 ASSESSMENT — PAIN SCALES - GENERAL: PAINLEVEL_OUTOF10: 9

## 2022-09-15 NOTE — PROGRESS NOTES
ACUTE REHABILITATION ADMISSION    Patient admitted to the Inpatient Rehabilitation Unit via Stretcher at 1402 (Time of Arrival). Patient oriented to room, unit and fall prevention safety measures. Education provided on the rehabilitation routine: three hours of therapy five days per week. Admitting medication orders compared with acute stay medications; home medication list reviewed with patient/family. Medication issues identified No  Medication issue:     Admitting medication orders reviewed with Acute Rehab PM&R Physician: Alfredo Teran. Cristhian Branham MD    Skin assessment performed by all active alterations in skin integrity, wounds, lines, drains and airways assessed, measured, recorded and reconciled. Refer to [facility] avatar and LDA flowsheet for additional information. Wound care consult order needed for complex wounds or pressure injuries? Yes If yes, contact physician for order. Bladder and Bowel Function Assessment:  1. Prior history of bladder problems: no problems with bladder    5. Last BM: 9/14  6. Prior history of bowel problems: No      Incontinence     Frequent diarrhea     Constipation     Hemorrhoids     Diverticulitis     Bowel Surgery    Care plan was created with patient's input and goals were agreed upon. Admission folder with the following documents provided:  1. \"[facility] Individualized Disclosure Statement\"  2. \"Data Collection Information Summary for Patients in Inpatient Rehabilitation Facilities\"  3. \"Privacy Act Statement - Health Care Records\"    Please refer to the admission navigator for further information.

## 2022-09-15 NOTE — PROGRESS NOTES
[address] Acute Inpatient Rehabilitation   PRE-ADMISSION ASSESSMENT    Patient Name: Kwaku Obando        MRN: [id number]    : 1945  (78 y.o.)  Gender: female     Admitted from:   [facility]  [][facility]   [][facility]   [x]Outside Admission - Location:  [facility]                                 [x]Initial         []Updated    Date of Onset / Admission to the acute hospital:  22    Inpatient Rehabilitation Admitting Diagnosis:  Right Hip Fracture    Did patient have surgery/procedures? []No  [x]Yes:      22 IM nail fixation     Physicians: Dr Jesus Conroy (Ortho), Dr Dae Kyle (IM)    Risk for clinical complications: Moderate    Co-morbidities:      COPD  CKD III  GERD  HTN  Sjogrens  Hx PE/APA syndrome  Acute blood loss anemia  Pain    Financial Information  Primary insurance:  [x]Medicare [] Medicare HMO  []Commercial insurance    []Medicaid   [] Medicaid HMO []Workers Compensation   []Personal Pay    Secondary Insurance:  []Medicare [] Medicare HMO  [x]Commercial insurance    []Medicaid  []Medicaid HMO  []Workers Compensation []None    Precautions:   []Cardiac Precautions:    [x]Total hip precautions:    [x]Weight Bearing status:  RLE WBAT  [x]Safety Precautions/Concerns:  Fall Risk, General Precautions  [x]Visually impaired:  Wears glasses at all times   []Hard of Hearing:   []Communication Aids, Devices or Interpretation Services:    Isolation Precautions:         [x]Yes  []No  If Yes:  [] Droplet  [x]Contact []Airborne     []VRE     []MRSA     []C-diff         [] TB       [x] ESBL [x] MDRO          [] Other:        Physiatrist:  []   Dr. Serna Check     [x]   Dr. Suha Page  []   Dr. Cyrus Nueñz  []   Dr. Issac Jackson    Patients Occupation: Part Time Employment    Reviewed Lab and Diagnostic reports from Current Admission: Yes    Patients Prior Functional  Level:   Independent with ADLs and functional transfers and gait    History of current illness, per PM&R Consult via record review:  Patient admitted 9/8/22 after mechanical fall over her purse with c/o pain in her R hip. Diagnostic studies confirmed closed R hip IT fracture with superior displacement, varus angulation and avulsion fracture of the lesser trochanter. She had IM nail fixation performed by orthopedics Dr. Francis Su on 9/9. She is WBAT RLE. Post operatively her Hb dropped to 5.9 on 9/10. She had 1 unit PRBCs transfused and improved to 8.7 on 9/11. Her creatinine improved from 1.36 to 1.23 on 9/11. TSH was low but Free T4 was WNL. She is on plaquenil for history of Sjogrens. She is not on anticoagulant but has [place]ardo filter for history of PE and APA syndrome.      Prognosis: Fair    Current functional status for upper extremity ADLs:  Mod A       Current functional status for lower extremity ADLs:  Mod A       Current functional status for bed, chair, wheelchair transfers:  CGA       Current functional status for toilet transfers:  CGA       Current functional status for locomotion:  25', RW, CGA       Current functional status for comprehension: Complete independence    Current functional status for expression: Complete independence    Current functional status for social interaction: Complete independence    Current functional status for problem solving: Complete independence    Current functional status for memory: Complete independence    Current Deficits R/T Impairment: Impaired Functional Mobility and Decreased ADLs    Required Therapy:   [x] Physical Therapy  [x] Occupational Therapy   [] Speech Therapy, as appropriate    Additional Services:    [x]     [] Recreational Therapy, as appropriate    [x] Nutrition Consult, as appropriate  [x] Dietary Needs/Preferences: No Specialized Dietary Needs/Preferences Identified  [] Dialysis  [x] Cultural/Scientology Needs/Preferences: No Specialized Cultural/Scientology Needs/Preferences Identified  [] Special Equipment Needs  [] Special Medication Needs  [] Other    Rehab Justification:  Needs 3 hrs therapy per day or 15 hours per week:  Yes  Identified Rehab Nursing needs: Yes  Intense Interdisciplinary need:  Yes  Need for 24 hr physician supervision:  Yes  Measurable improved quality of life:  Yes  Willingness to participate:  Yes  Medical Necessity:  Yes  Patient able to tolerate care proposed:  Yes    Expected Discharge Destination/Functional Level:  Home with assist  Expected length of time to achieve that level of improvement: 1-2 weeks  Expected Post Discharge Treatments: Home with possible Home Care    Other information relevant to patient's care needs:  N/A    Acute Inpatient Rehabilitation Disclosure Statement will be provided to patient upon admission to ARU with patient's verbalization of understanding. I have reviewed and concur with the findings and results of the pre-admission screening assessment completed by the Inpatient Rehabilitation Admissions Coordinator.

## 2022-09-15 NOTE — PROGRESS NOTES
Spoke with [facility] who states pt is medically ready for ARU. Writer requested admitting orders be faxed and report be called to [address] [place] [address], and contact info provided. Pt is not on chemical a/c for DVT prophylaxis but does have a [place] Filter. Pre-Admission assessment completed and Dr Junior Luo notified.

## 2022-09-16 LAB
ABSOLUTE BANDS #: 0.06 K/UL (ref 0–1)
ABSOLUTE EOS #: 0 K/UL (ref 0–0.4)
ABSOLUTE LYMPH #: 0.34 K/UL (ref 1–4.8)
ABSOLUTE MONO #: 0.34 K/UL (ref 0.1–1.3)
ALBUMIN SERPL-MCNC: 2 G/DL (ref 3.5–5.2)
ALP BLD-CCNC: 61 U/L (ref 35–104)
ALT SERPL-CCNC: 9 U/L (ref 5–33)
ANION GAP SERPL CALCULATED.3IONS-SCNC: 10 MMOL/L (ref 9–17)
AST SERPL-CCNC: 21 U/L
BANDS: 1 % (ref 0–10)
BASOPHILS # BLD: 0 % (ref 0–2)
BASOPHILS ABSOLUTE: 0 K/UL (ref 0–0.2)
BILIRUB SERPL-MCNC: 0.4 MG/DL (ref 0.3–1.2)
BILIRUBIN DIRECT: 0.1 MG/DL
BILIRUBIN, INDIRECT: 0.3 MG/DL (ref 0–1)
BUN BLDV-MCNC: 19 MG/DL (ref 8–23)
CALCIUM SERPL-MCNC: 8.7 MG/DL (ref 8.6–10.4)
CHLORIDE BLD-SCNC: 107 MMOL/L (ref 98–107)
CO2: 14 MMOL/L (ref 20–31)
CREAT SERPL-MCNC: 1.02 MG/DL (ref 0.5–0.9)
EOSINOPHILS RELATIVE PERCENT: 0 % (ref 0–4)
GFR AFRICAN AMERICAN: >60 ML/MIN
GFR NON-AFRICAN AMERICAN: 53 ML/MIN
GFR SERPL CREATININE-BSD FRML MDRD: ABNORMAL ML/MIN/{1.73_M2}
GLUCOSE BLD-MCNC: 77 MG/DL (ref 70–99)
HCT VFR BLD CALC: 27.7 % (ref 36–46)
HEMOGLOBIN: 9 G/DL (ref 12–16)
LYMPHOCYTES # BLD: 6 % (ref 24–44)
MCH RBC QN AUTO: 30.4 PG (ref 26–34)
MCHC RBC AUTO-ENTMCNC: 32.5 G/DL (ref 31–37)
MCV RBC AUTO: 93.7 FL (ref 80–100)
MONOCYTES # BLD: 6 % (ref 1–7)
MORPHOLOGY: ABNORMAL
PDW BLD-RTO: 17.6 % (ref 11.5–14.9)
PLATELET # BLD: 226 K/UL (ref 150–450)
PMV BLD AUTO: 6.9 FL (ref 6–12)
POTASSIUM SERPL-SCNC: 4.3 MMOL/L (ref 3.7–5.3)
RBC # BLD: 2.95 M/UL (ref 4–5.2)
SEG NEUTROPHILS: 87 % (ref 36–66)
SEGMENTED NEUTROPHILS ABSOLUTE COUNT: 4.86 K/UL (ref 1.3–9.1)
SODIUM BLD-SCNC: 131 MMOL/L (ref 135–144)
TOTAL PROTEIN: 5.6 G/DL (ref 6.4–8.3)
WBC # BLD: 5.6 K/UL (ref 3.5–11)

## 2022-09-16 PROCEDURE — 6370000000 HC RX 637 (ALT 250 FOR IP): Performed by: PHYSICAL MEDICINE & REHABILITATION

## 2022-09-16 PROCEDURE — 6370000000 HC RX 637 (ALT 250 FOR IP): Performed by: NURSE PRACTITIONER

## 2022-09-16 PROCEDURE — 97162 PT EVAL MOD COMPLEX 30 MIN: CPT

## 2022-09-16 PROCEDURE — 99222 1ST HOSP IP/OBS MODERATE 55: CPT | Performed by: INTERNAL MEDICINE

## 2022-09-16 PROCEDURE — 97116 GAIT TRAINING THERAPY: CPT

## 2022-09-16 PROCEDURE — 97535 SELF CARE MNGMENT TRAINING: CPT

## 2022-09-16 PROCEDURE — 99223 1ST HOSP IP/OBS HIGH 75: CPT | Performed by: PHYSICAL MEDICINE & REHABILITATION

## 2022-09-16 PROCEDURE — 97530 THERAPEUTIC ACTIVITIES: CPT

## 2022-09-16 PROCEDURE — 36415 COLL VENOUS BLD VENIPUNCTURE: CPT

## 2022-09-16 PROCEDURE — 85025 COMPLETE CBC W/AUTO DIFF WBC: CPT

## 2022-09-16 PROCEDURE — 6370000000 HC RX 637 (ALT 250 FOR IP): Performed by: INTERNAL MEDICINE

## 2022-09-16 PROCEDURE — 80076 HEPATIC FUNCTION PANEL: CPT

## 2022-09-16 PROCEDURE — 80048 BASIC METABOLIC PNL TOTAL CA: CPT

## 2022-09-16 PROCEDURE — 1180000000 HC REHAB R&B

## 2022-09-16 PROCEDURE — 97166 OT EVAL MOD COMPLEX 45 MIN: CPT

## 2022-09-16 PROCEDURE — 97110 THERAPEUTIC EXERCISES: CPT

## 2022-09-16 RX ORDER — AMLODIPINE BESYLATE 5 MG/1
5 TABLET ORAL DAILY
Status: DISCONTINUED | OUTPATIENT
Start: 2022-09-16 | End: 2022-09-23 | Stop reason: HOSPADM

## 2022-09-16 RX ADMIN — GUAIFENESIN 1200 MG: 600 TABLET, EXTENDED RELEASE ORAL at 07:32

## 2022-09-16 RX ADMIN — Medication 1 CAPSULE: at 07:39

## 2022-09-16 RX ADMIN — HYDROXYCHLOROQUINE SULFATE 200 MG: 200 TABLET ORAL at 07:33

## 2022-09-16 RX ADMIN — FLUOCINONIDE: 0.5 CREAM TOPICAL at 21:52

## 2022-09-16 RX ADMIN — FLUOCINONIDE: 0.5 CREAM TOPICAL at 07:40

## 2022-09-16 RX ADMIN — Medication 1 CAPSULE: at 17:22

## 2022-09-16 RX ADMIN — Medication 1 CAPSULE: at 12:17

## 2022-09-16 RX ADMIN — POLYETHYLENE GLYCOL 3350 17 G: 17 POWDER, FOR SOLUTION ORAL at 07:32

## 2022-09-16 RX ADMIN — CEPHALEXIN 500 MG: 500 CAPSULE ORAL at 07:32

## 2022-09-16 RX ADMIN — AMLODIPINE BESYLATE 5 MG: 5 TABLET ORAL at 21:51

## 2022-09-16 RX ADMIN — Medication 1000 UNITS: at 07:39

## 2022-09-16 RX ADMIN — OXYCODONE HYDROCHLORIDE AND ACETAMINOPHEN 1 TABLET: 5; 325 TABLET ORAL at 03:55

## 2022-09-16 RX ADMIN — OXYCODONE HYDROCHLORIDE AND ACETAMINOPHEN 1 TABLET: 5; 325 TABLET ORAL at 21:51

## 2022-09-16 RX ADMIN — OXYCODONE HYDROCHLORIDE AND ACETAMINOPHEN 1 TABLET: 5; 325 TABLET ORAL at 07:45

## 2022-09-16 RX ADMIN — OXYCODONE HYDROCHLORIDE AND ACETAMINOPHEN 1 TABLET: 5; 325 TABLET ORAL at 17:22

## 2022-09-16 RX ADMIN — ASPIRIN 81 MG 81 MG: 81 TABLET ORAL at 07:41

## 2022-09-16 RX ADMIN — OXYCODONE HYDROCHLORIDE AND ACETAMINOPHEN 1 TABLET: 5; 325 TABLET ORAL at 12:17

## 2022-09-16 RX ADMIN — LEVOTHYROXINE SODIUM 112 MCG: 112 TABLET ORAL at 07:33

## 2022-09-16 RX ADMIN — CEPHALEXIN 500 MG: 500 CAPSULE ORAL at 21:51

## 2022-09-16 ASSESSMENT — PAIN DESCRIPTION - DESCRIPTORS
DESCRIPTORS: ACHING;THROBBING
DESCRIPTORS: ACHING
DESCRIPTORS: ACHING;THROBBING
DESCRIPTORS: ACHING
DESCRIPTORS: DISCOMFORT;THROBBING;TENDER

## 2022-09-16 ASSESSMENT — PAIN SCALES - GENERAL
PAINLEVEL_OUTOF10: 7
PAINLEVEL_OUTOF10: 7
PAINLEVEL_OUTOF10: 8
PAINLEVEL_OUTOF10: 4
PAINLEVEL_OUTOF10: 8
PAINLEVEL_OUTOF10: 7

## 2022-09-16 ASSESSMENT — PAIN - FUNCTIONAL ASSESSMENT
PAIN_FUNCTIONAL_ASSESSMENT: PREVENTS OR INTERFERES SOME ACTIVE ACTIVITIES AND ADLS
PAIN_FUNCTIONAL_ASSESSMENT: ACTIVITIES ARE NOT PREVENTED
PAIN_FUNCTIONAL_ASSESSMENT: PREVENTS OR INTERFERES WITH MANY ACTIVE NOT PASSIVE ACTIVITIES

## 2022-09-16 ASSESSMENT — PAIN DESCRIPTION - ORIENTATION
ORIENTATION: RIGHT

## 2022-09-16 ASSESSMENT — PAIN DESCRIPTION - PAIN TYPE: TYPE: ACUTE PAIN

## 2022-09-16 ASSESSMENT — PAIN DESCRIPTION - LOCATION
LOCATION: HIP;LEG
LOCATION: LEG
LOCATION: LEG;HIP
LOCATION: LEG
LOCATION: HIP;LEG

## 2022-09-16 ASSESSMENT — PAIN DESCRIPTION - ONSET: ONSET: ON-GOING

## 2022-09-16 ASSESSMENT — PAIN DESCRIPTION - FREQUENCY: FREQUENCY: CONTINUOUS

## 2022-09-16 NOTE — CARE COORDINATION
CASE MANAGEMENT NOTE:    Admission Date:  9/15/2022 Carlyn Cantu is a 78 y.o.  female    Admitted for : Fracture of hip, closed, right, sequela [S72.001S]    Met with:  Patient    PCP:  Williams Contreras DO                                Insurance:  Medicare, secondary [place]    Is patient alert and oriented at time of discussion:  Yes    Current Residence/ Living Arrangements:  independently at home, with 12year old son       Does patient have 24 hour assistance at home:  No    Current Services PTA:  No    Does patient go to outpatient dialysis: No  If yes, location and chair time: n/a    Is patient agreeable to VNS/Outpatient therapy Agreeable to outpatient, at [address] of choice provided:  No    List of Home Care Agencies/Outpatient therapy provided: No    VNS/Outpatient therapy chosen:  Yes    DME:  crutches and walker    Home Oxygen: No    Nebulizer: No    CPAP/BIPAP: No    Supplier: N/A    Handicap Placard: No    Potential Assistance Needed: Yes    Pharmacy:  [facility]       Does Patient want to use MEDS to BEDS? No    Is patient currently receiving oral anticoagulation therapy? NA    Family Members/Caregivers that pt would like involved in their care:    Yes    If yes, list name here:  Leslie Gadiel    Transportation Provider:  Family             Discharge Plan:  Dispo: home with 12 y.o son and outpatient therapy DME: walker and crutches Placard: none         Electronically signed by:  IONA Medel on 9/16/2022 at 9:44 AM

## 2022-09-16 NOTE — CARE COORDINATION
[x] MDRO          [] Other:                        Physiatrist:  []   Dr. Susan Holbrook     [x]   Dr. Antonio Flood  []   Dr. Sharif Yuan  []   Dr. Isreal Bowling     Patients Occupation: Part Time Employment     Reviewed Lab and Diagnostic reports from Current Admission: Yes     Patients Prior Functional  Level: Independent with ADLs and functional transfers and gait     History of current illness, per PM&R Consult via record review:  Patient admitted 9/8/22 after mechanical fall over her purse with c/o pain in her R hip. Diagnostic studies confirmed closed R hip IT fracture with superior displacement, varus angulation and avulsion fracture of the lesser trochanter. She had IM nail fixation performed by orthopedics Dr. Johnathan Escobar on 9/9. She is WBAT RLE. Post operatively her Hb dropped to 5.9 on 9/10. She had 1 unit PRBCs transfused and improved to 8.7 on 9/11. Her creatinine improved from 1.36 to 1.23 on 9/11. TSH was low but Free T4 was WNL. She is on plaquenil for history of Sjogrens. She is not on anticoagulant but has [place] filter for history of PE and APA syndrome.       Prognosis: Fair     Current functional status for upper extremity ADLs:  Mod A     Current functional status for lower extremity ADLs:  Mod A     Current functional status for bed, chair, wheelchair transfers:  CGA     Current functional status for toilet transfers:  CGA     Current functional status for locomotion:  25', RW, CGA     Current functional status for comprehension: Complete independence     Current functional status for expression: Complete independence     Current functional status for social interaction: Complete independence     Current functional status for problem solving: Complete independence     Current functional status for memory: Complete independence     Current Deficits R/T Impairment: Impaired Functional Mobility and Decreased ADLs     Required Therapy:   [x] Physical Therapy  [x] Occupational Therapy   [] Speech Therapy, as appropriate     Additional Services:    [x]     [] Recreational Therapy, as appropriate    [x] Nutrition Consult, as appropriate  [x] Dietary Needs/Preferences: No Specialized Dietary Needs/Preferences Identified  [] Dialysis  [x] Cultural/Congregation Needs/Preferences: No Specialized Cultural/Congregation Needs/Preferences Identified  [] Special Equipment Needs  [] Special Medication Needs  [] Other     Rehab Justification:  Needs 3 hrs therapy per day or 15 hours per week:  Yes  Identified Rehab Nursing needs: Yes  Intense Interdisciplinary need:  Yes  Need for 24 hr physician supervision:  Yes  Measurable improved quality of life:  Yes  Willingness to participate:  Yes  Medical Necessity:  Yes  Patient able to tolerate care proposed:  Yes     Expected Discharge Destination/Functional Level:  Home with assist  Expected length of time to achieve that level of improvement: 1-2 weeks  Expected Post Discharge Treatments: Home with possible Home Care     Other information relevant to patient's care needs:  N/A     Acute Inpatient Rehabilitation Disclosure Statement will be provided to patient upon admission to ARU with patient's verbalization of understanding. I have reviewed and concur with the findings and results of the pre-admission screening assessment completed by the Inpatient Rehabilitation Admissions Coordinator.                Cosigned by: Brandee Henao MD at 9/15/2022 12:30 PM

## 2022-09-16 NOTE — CARE COORDINATION
Patient Health Questionnaire-9 (PHQ-9)    Over the last 2 weeks, how often have you been bothered by any of the following problems? 1. Little Interest or pleasure in doing things? [x] Not at all  [] Several Days  [] More than half the day  []  Nearly every day    2. Feeling down, depressed or hopeless? [x] Not at all  [] Several Days  [] More than half the day  []  Nearly every day    3. Trouble falling or staying asleep, or sleeping too much? [] Not at all  [x] Several Days  [] More than half the day  []  Nearly every day    4. Feeling tired or having little energy? [] Not at all  [x] Several Days  [] More than half the day  []  Nearly every day    5. Poor apettite or overeating? [] Not at all  [] Several Days  [] More than half the day  [x]  Nearly every day    6. Feeling bad about yourself-or that you are a failure or have let yourself or your family down? [x] Not at all  [] Several Days  [] More than half the day  []  Nearly every day    7. Trouble concentrating on things, such as reading the newspaper or watching television? [x] Not at all  [] Several Days  [] More than half the day  []  Nearly every day    8. Moving or speaking so slowly that other people could have noticed? Or the opposite-being so fidgety or restless that you have been moving around a lot more than usual?   [] Not at all  [x] Several Days  [] More than half the day  []  Nearly every day    9. Thoughts that you would be better off dead or of hurting yourself in some way? [x] Not at all  [] Several Days  [] More than half the day  []  Nearly every day    Total Score: 6    If you checked off any problems, how difficult have these problems made it for you to do your work, take care of things at home, or get along with other people?    [x] Not difficult at all  [] Somewhat Difficult  [] Very Difficult  []  Extremely Difficult

## 2022-09-16 NOTE — PROGRESS NOTES
This writer called Dr. Kan Willis's office, spoke with staff, inquired about recommendations for pharmaceutical DVT prophylaxis. Was told, \"we are headed for the day. A PA may call you back, or it might have to wait until Monday. \"  No new orders given at this time. Dr. Medhat Seth made aware. EPC cuffs to be kept on patient while not on pharmaceutical DVT prophylaxis.

## 2022-09-16 NOTE — H&P
Physical Medicine & Rehabilitation History and Physical  [facility] Acute Rehabilitation Unit     Primary care provider: Tenisha Alonzo DO     Chief Complaint and Reason for Rehabilitation Admission:   [place] Perone and ADL dysfunction secondary to right hip fracture IM nail fixation on 9/9    History of Present Illness:  Asif Lauren  is a 78 y.o. female admitted to the [address] [place] [address] on 9/15/2022.     68-year-old female status post fall of her purse with complaint of right hip pain. She was found to have a three-part distal right hip IT fracture with superior displacement, varus angulation avulsion fracture right lesser trochanter. She underwent IM nail fixation by Dr. Reny Bright on 9/9. She is weightbearing as tolerated right lower extremity. She had postop anemia hemoglobin 5.9 on 9/10 had 1 unit packed red blood cells and improved to 8.7 on 9/11. Creatinine improved to 1.36 1.2309/11. CKD stage III. She is on Plaquenil for history of Sjogren's. She is not on anticoagulation but has [place] filter for history of PE and  antiphospholipid antibody syndrome syndrome. Dr. Frances Mims on 9/11-not on chemical DVT prophylaxis because of low hemoglobin, will start if hemoglobin remained stable and if okay with orthopedics    is currently requiring assistance for self-care activities and mobility prompting this admission. Radiology    X-ray right hip 9/8/2022    There is an intertrochanteric fracture of the right hip with superior displacement and varus angulation     IMPRESSION: Right intertrochanteric fracture     X-ray femur right 9/8/2022  4 views of the right femur were obtained. There is an intertrochanteric right hip fracture with avulsion of the lesser trochanter. Femoral head maintains normal alignment with the acetabulum.      IMPRESSION:     Intertrochanteric right hip fracture    Chestx-ray 9/11/2022    *  Cardiomegaly and small effusions without superimposed vascular congestion at this time.          Premorbid function:  Modified independent      Current Function:  PT:  Restrictions/Precautions: Weight Bearing, Fall Risk, General Precautions, Up as Tolerated  Implants present? : Metal implants (R ERNESTO)  Other position/activity restrictions: RLE FBWAT  Right Lower Extremity Weight Bearing: Weight Bearing As Tolerated (FWBAT)                                OT:   ST:          Past Medical History:      Diagnosis Date    CKD (chronic kidney disease) stage 3, GFR 30-59 ml/min     Dehydration     Dysphagia     GERD (gastroesophageal reflux disease)     [place] filter in place     History of DVT (deep vein thrombosis)     Hx: UTI (urinary tract infection)     Hypertension     Hyperthyroidism 1995    XRT    Hyperthyroidism     Hypothyroidism     due to Rx for Graves disease    Iron deficiency anemia     Lupus (HCC)     Metabolic acidosis     Pharyngitis     Pulmonary embolism (HCC)     Pyelonephritis     Pyelonephritis     Renal abscess     Right kidney mass     Seizures ([facility] [address]     SLE ([place] encephalitis)        Past Surgical History:      Procedure Laterality Date    APPENDECTOMY      BUNIONECTOMY      BUNIONECTOMY      COLONOSCOPY      HYSTERECTOMY      REVISION COLOSTOMY      VENA CAVA FILTER PLACEMENT         Allergies:    Ertapenem, Codeine, and Nubain [nalbuphine hcl]    Medications   Scheduled Meds:   polyethylene glycol  17 g Oral Daily    lactobacillus  1 capsule Oral TID WC    aspirin  81 mg Oral Daily    ascorbic acid  500 mg Oral Once per day on Mon Thu    Vitamin D  1,000 Units Oral Daily    cephALEXin  500 mg Oral 2 times per day    hydroxychloroquine  200 mg Oral Daily    levothyroxine  112 mcg Oral Daily    guaiFENesin  1,200 mg Oral Daily    nicotine  1 patch TransDERmal Daily    calcium elemental  500 mg Oral Once per day on Mon Thu    fluocinonide   Topical BID     Continuous Infusions:  PRN Meds:.oxyCODONE-acetaminophen, acetaminophen, senna, bisacodyl, promethazine       Diagnostics:       CBC:   Recent Labs     09/16/22  0625   WBC 5.6   RBC 2.95*   HGB 9.0*   HCT 27.7*   MCV 93.7   RDW 17.6*        BMP:   Recent Labs     09/16/22  0625   *   K 4.3      CO2 14*   BUN 19   CREATININE 1.02*     BNP: No results for input(s): BNP in the last 72 hours. PT/INR: No results for input(s): PROTIME, INR in the last 72 hours. APTT: No results for input(s): APTT in the last 72 hours. CARDIAC ENZYMES: No results for input(s): CKMB, CKMBINDEX, TROPONINT in the last 72 hours. Invalid input(s): CKTOTAL;3  FASTING LIPID PANEL:  Lab Results   Component Value Date    CHOL 142 10/21/2014    HDL 58 10/21/2014    TRIG 62 10/21/2014     LIVER PROFILE:   Recent Labs     09/16/22  0625   AST 21   ALT 9   BILIDIR 0.1   BILITOT 0.4   ALKPHOS 61        I/O (24Hr): No intake or output data in the 24 hours ending 09/16/22 1225    Glu last 24 hour  No results for input(s): POCGLU in the last 72 hours. No results for input(s): CLARITYU, COLORU, PHUR, SPECGRAV, PROTEINU, RBCUA, BLOODU, BACTERIA, NITRU, WBCUA, LEUKOCYTESUR, YEAST, GLUCOSEU, BILIRUBINUR in the last 72 hours. Social History:  Lives with son in [place] home one-step to enter. Was independent prior with rolling walker. Family History:       Problem Relation Age of Onset    High Blood Pressure Mother     Stroke Mother     Substance Abuse Mother     Cancer Father     Heart Disease Father     High Blood Pressure Sister     Substance Abuse Brother     Stroke Maternal Grandmother     Stroke Paternal Grandfather        Review of Systems:  CONSTITUTIONAL:  Denies fevers, chills, sweats or fatigue. EYES:  Denies diplopia, blind spots, blurring. HEENT:  Denies hearing loss, trouble chewing or swallowing. RESPIRATORY:  No wheezing, coughing, shortness of breath. CARDIOVASCULAR:  Denies chest pain, palpitations, lightheadedness.     GASTROINTESTINAL:  Denies heartburn, nausea, constipation, diarrhea, abdominal pain. GENITOURINARY:  No urgency, frequency, incontinence, dysuria. ENDOCRINE:  Denies hot or cold intolerance. MUSCULOSKELETAL:  Denies focal joint pain, back pain, neck pain. NEUROLOGICAL:  Denies focal weakness, numbness, tingling, balance loss, headache. BEHAVIOR/PSYCH:  Denies depression, anxiety, memory loss, insomnia. SKIN:  No ulcers, rash, bruises. Physical Exam:  BP (!) 151/82   Pulse 78   Temp 97.8 °F (36.6 °C) (Oral)   Resp 14   Ht 5' 3\" (1.6 m)   Wt 134 lb 6.4 oz (61 kg)   SpO2 97%   BMI 23.81 kg/m²   HEENT:  Symmetrical facial features. EOMI. Visual fields intact. Hearing intact. Speech fluent, no dystarthria. Comprehension intact. Object naming intact. Repetition intact. Basic cognition intact. NECK:  ROM functional.  Carotid bruit negative. THORAX:  Symmetrical.    LUNGS:  Clear to ausculation. HEART:  Regular. No murmurs of gallops. ABDOMEN:  Non-distended. Normal bowel sounds. No guarding, tenderness, mass. BACK:  No ulcers or deformity. EXTREMITIES:  PROM within functional limits. No calf tenderness, edema. Feet warm. NEUROMUSCULAR:  Sensation intact, no extinction. Coordination smooth. Motor testing  /5 upper extremities and distal right lower extremity limited proximal right lower extremity . Balance impaired. SKIN:  abnormal dressing over incision-nursing notes look clean       Principal Diagnosis/plan:  Ambulatory and ADL dysfunction secondary to right hip fracture status post IM nailing on 9/9    She will benefit from intensive interdisciplinary therapies and rehab nursing care and is appropriate for inpatient rehabilitation. The post admission physician evaluation (KAVON) is consistent with the pre-admission assessment. See above findings to reflect the elements required in the KAVON.   Patient's admitting condition is consistent with the findings of the preadmission assessment by the rehabilitation admissions coordinator. Other Diagnoses/plan:    Ambulatory and ADL dysfunction due to fall with right hip fracture status post IM nailing-weightbearing as tolerated-continue PT/OT/nursing work on transfers, ambulation, ADLs, steps, family training, home goal in approximately 10 days at intermittent supervision to modified independent level. Prognosis good   post fall with right hip fracture and IM nailing-continue monitor incision, currently dressing just changed, nursing good, will reevaluate in a.m. Pain Percocet  UTI-complete Keflex  Blood loss anemia hemoglobin 9.0  Hypertension-not on blood pressure medication, blood pressure 151/82-may need there resumed, IM follow  Hypothyroid Synthroid  CKD stage III creatinine 1.02  Smoker-NicoDerm patch  Connective tissue tissue disorder-Sjogren's-Plaquenil  Vitamin D- supplementation calcium  Questionable on high-dose Mucinex early patient takes daily due to drainage from sinuses  DVT prophylaxis-history of PE antiphospholipid antibody syndrome -has [place] filter per internal medicine on 9/11-consider began DVT prophylaxis if okay with Ortho and hemoglobin stable will clarify with Ortho if okay to begin DVT prophylaxis and what they would like andl length currently on 81 mg aspirin  Medicine for medical management    DVT Prophylaxis:  SCD's while in bed and ARNOL's while in bed    Estimated Length of Stay:  1 weeks. Prognosis  good    Goals    Home at [place] [id number]. Morelia Fenton MD       This note is created with the assistance of a speech recognition program.  While intending to generate a document that actually reflects the content of the visit, the document can still have some errors including those of syntax and sound a like substitutions which may escape proof reading.   In such instances, actual meaning can be extrapolated by contextual diversion

## 2022-09-16 NOTE — PROGRESS NOTES
Physical Therapy  Facility/Department: [facility] ACUTE REHAB  Rehabilitation Physical Therapy Initial Assessment    NAME: Manuela Natarajan  : 1945 (78 y.o.)  MRN: [id number]  CODE STATUS: Full Code    Date of Service: 22      Past Medical History:   Diagnosis Date    CKD (chronic kidney disease) stage 3, GFR 30-59 ml/min     Dehydration     Dysphagia     GERD (gastroesophageal reflux disease)     Turlock filter in place     History of DVT (deep vein thrombosis)     Hx: UTI (urinary tract infection)     Hypertension     Hyperthyroidism     XRT    Hyperthyroidism     Hypothyroidism     due to Rx for Graves disease    Iron deficiency anemia     Lupus (HCC)     Metabolic acidosis     Pharyngitis     Pulmonary embolism (HCC)     Pyelonephritis     Pyelonephritis     Renal abscess     Right kidney mass     Seizures ([address]     SLE ([facility] encephalitis)      Past Surgical History:   Procedure Laterality Date    APPENDECTOMY      BUNIONECTOMY      BUNIONECTOMY      COLONOSCOPY      HYSTERECTOMY      REVISION COLOSTOMY      VENA CAVA FILTER PLACEMENT         Chart Reviewed: Yes  Patient assessed for rehabilitation services?: Yes  Additional Pertinent Hx: The patient is a 78 y.o. female, S/P mechanical fall at home with subsequent sustained R femur fracture, S/P Intramedullary trochanteric fixation nail to R Hip Performed by Dr. Abimbola German on 22  Family / Caregiver Present: No  Referring Practitioner: Dr. Juan Abdul  Referral Date : 09/15/22  Diagnosis: Closed R Hip Fracture; S/P R IM nailing 22    Restrictions:  Restrictions/Precautions: Weight Bearing; Fall Risk;General Precautions; Up as Tolerated  Lower Extremity Weight Bearing Restrictions  Right Lower Extremity Weight Bearing: Weight Bearing As Tolerated (FWBAT)  Position Activity Restriction  Other position/activity restrictions: RLE FBWAT     SUBJECTIVE  Subjective: Nurse Selvin Arreguin and Patient are agreeable to therapy assessments, \"I need to build up these arms!, I could walk farther if my arms weren't so tired from using the walker\"  Pain: Pt reports 6-7/10 pain in lateral RLE (radiating down from hip)       Post Treatment Pain Screening       Prior Level of Function:  Social/Functional History  Lives With: Family (Son- 12years old)  Type of Home:  [facility])  Home Layout: One level, Performs ADL's on one level, Able to Live on Main level with bedroom/bathroom  Home Access: Stairs to enter without rails (Per pt - School/[employer] community friends are able to install entry stair railings)  Entrance Stairs - Number of Steps: 2  Bathroom Shower/Tub: Tub/Shower unit, Doors  Bathroom Toilet: Handicap height  Bathroom Equipment:  (Sink next to toilet)  Bathroom Accessibility: Walker accessible  Home Equipment: Verito Lucho, rolling, Rollator, Crutches  Receives Help From: Friend(s), Family  ADL Assistance: Independent  Homemaking Assistance: Independent  Homemaking Responsibilities: Yes  Ambulation Assistance: Independent  Transfer Assistance: Independent  Active : Yes  Mode of Transportation: Car  Occupation: Part time employment  Type of Occupation: Pre-K aide  Leisure & Hobbies: Hangout with grandsons, go to pumpkin patch  IADL Comments: Flat regular bed  Additional Comments: Son is in [address] who lives with pt. Pt reports having other family and friends in the area who can provide assist as needed. Pt was not receiving OT/PT before the surgery.       OBJECTIVE  Vision  Vision: Within Functional Limits    Hearing  Hearing: Within functional limits    Cognition  Overall Cognitive Status: WFL    ROM  PROM RLE (degrees)  RLE PROM: WFL  AROM RLE (degrees)  RLE AROM: WFL  PROM LLE (degrees)  LLE PROM: WFL  AROM LLE (degrees)  LLE AROM : WFL  AROM RUE (degrees)  RUE General AROM: See OT  AROM LUE (degrees)  LUE General AROM: See OT    Strength  Strength RLE  Strength RLE: Exception  R Hip Flexion: 3-/5  R Hip Extension: 3/5  R Knee Flexion: 4-/5  R Knee Extension: 4-/5  R Ankle Dorsiflexion: 4-/5  R Ankle Plantar flexion: 4/5  Strength LLE  Strength LLE: WFL  Comment: Grossly 4/5 Except Hip flexion 4-/5  Strength RUE  Comment: See OT  Strength LUE  Comment: See OT    Quality of Movement       Sensation  Overall Sensation Status: WFL    Functional Mobility  Bed mobility  Bed Mobility Comments: Unable to formally assess as pt is Seated in recliner at beginning and End of session- pt does note difficulty advancing RLE in/out of bed; Writer Educates/demonstrates use of Gait belt as leg ; Allows pt to practice Self Assisted RLE movement with Gait belt while in recliner. Transfers  Sit to Stand: Contact guard assistance  Stand to sit: Contact guard assistance  Stand Pivot Transfers: Contact guard assistance  Comment: RW for all transfer, pt demos safe hands placement without cueing needed. Balance  Posture: Good  Sitting - Static: Good  Sitting - Dynamic: Fair;+  Standing - Static: Fair (RW)  Standing - Dynamic: Fair (RW)  Comments: Standing balance assessed with RW, No LOB to report    Environmental Mobility  Ambulation  Surface: level tile  Device: Rolling Walker  Other Apparatus:  (Writer Follows with W/C)  Assistance: Contact guard assistance  Quality of Gait: Antalgic, Slow but steady. Moderate BUE support on RW. Dec Heel contact/toe push-off bilaterally  Gait Deviations: Slow Natalya;Decreased step length; Increased STEVE; Decreased step height  Distance: 89', 6'  Comments: VCs for RW proximity and posture with fair carryover demo. Pt Amb 80' during 2 MWT Equating to Gait Speed of 0.215 m/s. pt complaining of BUE fatigue post task. More Ambulation?: Yes  Ambulation 2  Surface - 2: ramp  Device 2: Rolling Walker  Assistance 2: Contact guard assistance  Quality of Gait 2: Antalgic, Slow but steady. Moderate BUE support on RW. Dec Heel contact/toe push-off bilaterally  Gait Deviations: Slow Natalya;Decreased step length;Decreased step height; Increased STEVE  Distance: 18'x2  Comments: Pt demo's good control/pacing during ramp negotiation - complaining of BUE fatigue during task. Stairs/Curb  Stairs?: Yes  Stairs  # Steps : 4  Stairs Height: 4\"  Rails: Bilateral  Device: No Device  Assistance: Contact guard assistance  Comment: With Cues/Edu/Demo pt demos good carry over sequence and stepping pattern to maximize safety and decrease pain with stair negotiation. Inc time to complete; pt with mild difficulty flexing R hip up to step with minimal clearance displayed. PT Exercises  Dynamic Standing Balance Exercises: Pt performs 1 Stoop and recover of object from floor using Reacher with CGA for standing balance. ASSESSMENT  Vitals  O2 Device: None (Room air)    Activity Tolerance  Activity Tolerance: Patient tolerated evaluation without incident;Patient limited by pain; Patient limited by endurance    Assessment  Assessment: Pt presents with RLE ROM and Strength deficits impairing balance, safety, and tolerance for activity and mobility s/p R Femur IM Nailing to stabilize femur fracture after mechanical fall. The patient requires CGA with all OOB mobility using RW on level surfaces tolerating up to 89' in one bout of walking. The patient will benefit from continued PT services and Family training to address deficits and maximize safety and independence with mobility. Performance Deficits/Impairments: Decreased functional mobility ; Decreased ADL status; Decreased ROM; Decreased strength;Decreased tolerance to work activity; Decreased body mechanics; Decreased endurance;Decreased sensation;Decreased balance; Increased pain  Treatment Diagnosis: Impaired functional mobility 2* Closed R Hip Fracture and s/p R Femur IM Nailing  Therapy Prognosis: Excellent  Decision Making: Medium Complexity  Exam: ROM, MMT, Balance, And functional mobility assessments; 2MWT  Clinical Presentation: Evolving;  Pt is alert, cooperative, and pleasant throughout; Very motivated  Barriers to Learning: none  Treatment Initiated : Functional mobility training; AAROM/AROM BLE  Discharge Recommendations: Patient would benefit from continued therapy after discharge  PT D/C Equipment  Equipment Needed: No (Pt has RW to use for home)    CLINICAL IMPRESSION   Pt presents with RLE ROM and Strength deficits impairing balance, safety, and tolerance for activity and mobility s/p R Femur IM Nailing to stabilize femur fracture after mechanical fall. The patient requires CGA with all OOB mobility using RW on level surfaces tolerating up to 89' in one bout of walking. The patient will benefit from continued PT services and Family training to address deficits and maximize safety and independence with mobility. GOALS  Patient Goals   Patient goals : To walk better  Short Term Goals  Time Frame for Short term goals: 5 days  Short term goal 1: Pt to demo All bed mobility with CGA using Gait belt as leglift assist as needed  Short term goal 2: pt to demo all transfers with RW and SBA  Short term goal 3: pt to ambulate 150' with RW and SBA  Short term goal 4: pt to negotiate 5 steps with rails and AD as needed to allow for safe home access. Short term goal 5: pt to identify effective methods for reducing pain and swelling (including proper LE Elevation techinque/Icing Technique)  Additional Goals?: Yes  Short Term Goal 6: Pt to demonstrate good technique for HEP exercises to improve pt's independence with self-care exercise routine  Long Term Goals  Time Frame for Long term goals : Until D/C  Long term goal 1: Pt to demo All bed mobility independently using Gait belt as leglift assist as needed  Long term goal 2: pt to demo all transfers with RW Mod I  Long term goal 3: pt to ambulate 150' with RW Mod I on level surfaces, 10-30' ramped surface RW Supervision, and Outdoor terrain with RW and SBA  Long term goal 4: pt to negotiate 5 steps with rails, SBA, and AD as needed to allow for safe home access.   Long term goal 5: pt to demo simulated or actual car transfer with safe technique, appropriate device, and SBA  Additional Goals?: Yes  Long term goal 6: Pt to Improve Distance on 2MWT By 113' to demo Minimal Detectable Change in walking speed. Goal is to walk 198-200' in 2 Minutes. PLAN OF CARE  Frequency: 1-2 treatment sessions per day, 5-7 days per week  Plan  Plan:  minutes of therapy at least 5 out of 7 days a week  Specific Instructions for Next Treatment: Progress ambulation; Nu-step; AROM/AAROM  Safety Devices  Type of Devices: All fall risk precautions in place;Call light within reach;Gait belt;Patient at risk for falls; Left in chair;Nurse notified (Nurse Michelle Gardner)  Restraints  Restraints Initially in Place: No      Therapy Time   Individual Concurrent Group Co-treatment   Time In 1035         Time Out 1135         Minutes 60           Timed Code Treatment Minutes: Andrea Mckenzie 9, PT, 09/16/22 at 2:43 PM

## 2022-09-16 NOTE — PROGRESS NOTES
[facility]: [facility]   Acute Rehabilitation Occupational Therapy Evaluation     Date: 22  Patient Name: Мария Alegria       Room: 4639/6335-46  MRN: [id number]  Account: [de-identified]   : 1945  (78 y.o.) Gender: female       Referring Practitioner: Golden Bagley MD  Diagnosis: Fracture of hip, closed, right, sequela    Treatment Diagnosis: Impaired self care status    Past Medical History:  has a past medical history of CKD (chronic kidney disease) stage 3, GFR 30-59 ml/min, Dehydration, Dysphagia, GERD (gastroesophageal reflux disease), Abelardo filter in place, History of DVT (deep vein thrombosis), Hx: UTI (urinary tract infection), Hypertension, Hyperthyroidism, Hyperthyroidism, Hypothyroidism, Iron deficiency anemia, Lupus ([address] Metabolic acidosis, Pharyngitis, Pulmonary embolism ([address] Pyelonephritis, Pyelonephritis, Renal abscess, Right kidney mass, Seizures ([address] and SLE ([facility] encephalitis). Past Surgical History:   has a past surgical history that includes Vena Cava Filter Placement; Hysterectomy; Bunionectomy; Appendectomy; Colonoscopy; Bunionectomy; and Revision Colostomy. Restrictions  Restrictions/Precautions: Weight Bearing, Fall Risk, General Precautions, Up as Tolerated  Required Braces or Orthoses?: No  Implants present? : Metal implants (R ERNESTO)      Position Activity Restriction  Other position/activity restrictions: RLE FBWAT      Subjective  Subjective: \"To get as much accomplished as I can and to come out stronger to keep up with my jobs, my grandchildren, and the little ones\", pt stated in regards to her goals for OT. Pt pleasant/cooperative for OT session. Comments: OK per JORDIN Barber Or for OT session and for taking a shower.     Pain: Pt reports 7-8/10 pain in lateral RLE (radiating down from hip)    Social/Functional History  Social/Functional History  Lives With: Family (Son- 12years old)  Type of Home:  [facility])  Home Layout: One level, Performs ADL's on one level, Able to Live on Main level with bedroom/bathroom  Home Access: Stairs to enter without rails (Per pt - School/[employer] friends are able to install entry stair railings)  Entrance Stairs - Number of Steps: 2  Bathroom Shower/Tub: Tub/Shower unit, Doors  Bathroom Toilet: Handicap height  Bathroom Equipment:  (Sink next to toilet)  Bathroom Accessibility: Walker accessible  Home Equipment: Walker, rolling, Rollator, Crutches  Has the patient had two or more falls in the past year or any fall with injury in the past year?: Yes (Fall led to hip surgery)  Receives Help From: Friend(s), Family  ADL Assistance: Independent  Homemaking Assistance: Independent  Homemaking Responsibilities: Yes  Ambulation Assistance: Independent  Transfer Assistance: Independent  Active : Yes  Mode of Transportation: Car  Occupation: Part time employment  Type of Occupation: Pre-K aide  Leisure & Hobbies: Hangout with grandsons, go to pumpkin patch  IADL Comments: Flat regular bed  Additional Comments: Son is in [address] who lives with pt. Pt reports having other family and friends in the area who can provide assist as needed. Pt was not receiving OT/PT before the surgery. Objective  Vision  Vision: Within Functional Limits       Hearing  Hearing: Within functional limits    Perception  Overall Perceptual Status: WFL    Sensation  Overall Sensation Status: WFL    Observation/Palpation  Edema: BLE Edema R > L    Cognition  Overall Orientation Status: Within Functional Limits  Orientation Level: Oriented X4    Cognition  Overall Cognitive Status: WFL    Activities of Daily Living  Feeding: Modified independent   Feeding Skilled Clinical Factors: Per pt report. Grooming: Setup  Grooming Skilled Clinical Factors: Pt positioned in wheelchair sink side. Pt able to complete oral hygiene, hair brushing, and face washing tasks.     UE Bathing: Stand by assistance  UE Bathing Skilled Clinical Factors: Pt positioned on shower bench. Pt completed UE bathing with increased time to complete. LE Bathing: Minimal assistance  LE Bathing Skilled Clinical Factors: Pt positioned on shower bench. Pt able to wash BLE and perineal area while shifting side to side to wash buttocks. Pt required min A- CGA for standing to thoroughly wash buttocks. UE Dressing: Stand by assistance  UE Dressing Skilled Clinical Factors: Pt positoned on shower bench. Pt able to don/doff shirt. LE Dressing: Maximum assistance  LE Dressing Skilled Clinical Factors: Pt positioned on shower bench. Pt able to pull pants/under garments down but needed assist for getting them off over B feet. Pt also required assist for threading BLE through pants/under garments. Mod A required for standing while pt pulled pants/briefs up over hips. Assist required for donning/doffing B socks and ARNOL hose. Toileting: Moderate assistance  Toileting Skilled Clinical Factors: Per therapy staff report. Pt required mod A for standing and assist for pulling under garments/pants back up. Pt able to complete toilet hygiene and able to pull pants down. Additional Comments: OT student facilitated pts engagement in self care tasks while positioned on shower bench. Pt currently limited by pain, strength, fatigue, endurance, balance, and activity tolerance impacting IND with self care tasks.     UE Function  LUE AROM (degrees)  LUE AROM : WFL        Tone RUE  RUE Tone: Normotonic    LUE Strength  Gross LUE Strength: WFL  L Hand General: 4-/5  LUE Strength Comment: Grossly: 4-/5     RUE AROM (degrees)  RUE AROM : WFL     Right Hand AROM (degrees)  Right Hand AROM: WFL  Tone LUE  LUE Tone: Normotonic    RUE Strength  Gross RUE Strength: WFL  R Hand General: 4-/5  RUE Strength Comment: Grossly: 4-/5      Fine Motor Skills/Coordination  Hand Dominance  Hand Dominance: Right  Coordination  Movements Are Fluid And Coordinated: No  Coordination and Movement Description: Gross motor impairments, Decreased speed, Decreased accuracy          Mobility  Bed mobility  Bed Mobility Comments: Unable to formally assess due to pt being on toilet at start of session and in chair at end of session. Balance  Balance  Sitting Balance: Stand by assistance  Standing Balance: Moderate assistance       Transfers  Transfers  Sit to stand: Moderate assistance  Stand to sit: Moderate assistance  Transfer Comments: Verbal cues provided for hand placement and safety techniques with Good carryover noted. Toilet Transfers  Toilet - Technique: Ambulating  Equipment Used: Grab bars  Toilet Transfer: Moderate assistance  Toilet Transfers Comments: Per therapy staff report. Functional mobility  Functional Mobility  Functional - Mobility Device: Rolling Walker  Activity: To/from bathroom  Assist Level: Moderate assistance  Functional Mobility Comments: Verbal cues provided for hand placement and safety techniques with Good carryover noted. Pt required cues for sequencing task. No LOB noted throughout. Increased time required to complete functional mobility.     Assessment  Activity Tolerance  Activity Tolerance: Patient Tolerated treatment well, Patient limited by pain, Patient limited by fatigue  Assessment  Performance deficits / Impairments: Decreased functional mobility , Decreased ADL status, Decreased ROM, Decreased strength, Decreased endurance, Decreased balance, Decreased high-level IADLs, Decreased fine motor control, Decreased coordination, Decreased posture  Treatment Diagnosis: Impaired self care status  Prognosis: Good  Decision Making: Medium Complexity  Discharge Recommendations: Home with assist PRN, Patient would benefit from continued therapy after discharge    Patient Education  Education  Education Given To: Patient  Education Provided: Role of Therapy, Plan of Care, Precautions, Safety, ADL Function, Mobility Training, Transfer Training, Fall Prevention Strategies  Education Provided Comments: OT student educated pt on safety techniques and hand placement when completing functional transfers/mobility. Good carryover noted. Education Method: Verbal  Barriers to Learning: None  Education Outcome: Verbalized understanding    OT Equipment Recommendations  Other: TBD    Safety Devices  Type of Devices: All fall risk precautions in place, Call light within reach, Gait belt, Patient at risk for falls, Left in chair, Nurse notified (Nurse Guillermina Degroot)  Restraints  Restraints Initially in Place: No    Goals  Patient Goals   Patient goals : \"To get as much accomplished as I can and to come out stronger to keep up with my jobs, my grandchildren, and the little ones\"  Short Term Goals  Time Frame for Short term goals: One week  Short Term Goal 1: Pt will complete LB dressing/bathing/toileting tasks with CGA and Good safety to improve IND and safety with self care tasks. Short Term Goal 2: Pt will complete UB dressing/bathing tasks with supervision and Good safety to improve IND and safety with self care tasks. Short Term Goal 3: Pt will complete functional transfers/mobility with CGA and Good safety to improve IND and safety with self care and mobility tasks. Short Term Goal 4: Pt will tolerate standing for 6+ minutes during functional activity of choice with CGA and Good safety to improve IND and safety with self care and mobility tasks. Short Term Goal 5: Pt will verbalize/demonstrate Good understanding of at least 3 AD/DME/modified techniques to improve IND and safety with self care and mobility tasks. Short Term Goal 6: OTR/EM to further assess FMC/ strength. Short Term Goal 7: Pt will participate in 30+ minutes of therapeutic exercises/functional activities to improve IND and safety with self care and mobility tasks.     Long Term Goals  Time Frame for Long term goals : By discharge  Long Term Goal 1: Pt will complete BADLs with modified IND and Good safety  to improve IND and safety with self care tasks. Long Term Goal 2: Pt will complete functional transfers/mobility with modified IND and Good safety to improve IND and safety with self care and mobility tasks. Long Term Goal 3: Pt will tolerate standing for 12+ minutes during functional activity of choice with modified IND and Good safety to improve IND and safety with self care and mobility tasks. Long Term Goal 4: Pt will verbalize/demonstrate Good understanding of at least 3 fall prevention/home safety strategies to improve IND and safety with self care and mobility tasks. Long Term Goal 5: Pt will complete medication management task with 100% accuracy with use of adaptive strategies to improve safety. Long Term Goal 6: Pt will complete light housekeeping/meal preparation task with supervision and Good safety to improve IND and safety with ADL/IADL tasks.     Plan  Plan  Times per Week: 5-7  Times per Day: Twice a day  Current Treatment Recommendations: Strengthening, ROM, Balance training, Functional mobility training, Endurance training, Pain management, Safety education & training, Patient/Caregiver education & training, Equipment evaluation, education, & procurement, Self-Care / ADL, Return to work related activity, Coordination training      OT Individual Minutes  OT Individual Minutes  Time In: 0801  Time Out: 7312  Minutes: 114  Time Code Minutes   Timed Code Treatment Minutes: 99 Minutes         Electronically signed by Uri MARION/NORRIS on 9/16/22 at 3:14 PM EDT

## 2022-09-16 NOTE — CONSULTS
[facility]    CONSULTATION / HISTORY AND PHYSICAL EXAMINATION            Date:   9/16/2022  Patient name:  Prudence Dyer  Date of admission:  9/15/2022  2:31 PM  MRN:   [id number]  Account:  [de-identified]  YOB: 1945  PCP:    Rupert Li DO  Room:   2617/2617-01  Code Status:    Full Code    Physician Requesting Consult: Katelyn Perez MD    Reason for Consult:  Medical management    Chief Complaint:     No chief complaint on file. Debility secondary to right hip surgery    History Obtained From:     Patient, EMR, nursing staff    History of Present Illness:     68-year-old female admitted from outside hospital after ORIF for right hip fracture performed on 9/9.   Course complicated by anemia hemoglobin to 5.9 status post transfusion medical history significant for Sjogren's for which she is taking Plaquenil, CKD stage III antiphospholipid antibody syndrome, history of PE patient has IVC filter not on any AC      Past Medical History:     Past Medical History:   Diagnosis Date    CKD (chronic kidney disease) stage 3, GFR 30-59 ml/min     Dehydration     Dysphagia     GERD (gastroesophageal reflux disease)     [place] filter in place     History of DVT (deep vein thrombosis)     Hx: UTI (urinary tract infection)     Hypertension     Hyperthyroidism 1995    XRT    Hyperthyroidism     Hypothyroidism     due to Rx for Graves disease    Iron deficiency anemia     Lupus (HCC)     Metabolic acidosis     Pharyngitis     Pulmonary embolism (HCC)     Pyelonephritis     Pyelonephritis     Renal abscess     Right kidney mass     Seizures ([address]     SLE ([facility] encephalitis)         Past Surgical History:     Past Surgical History:   Procedure Laterality Date    APPENDECTOMY      BUNIONECTOMY      BUNIONECTOMY      COLONOSCOPY      HYSTERECTOMY      REVISION COLOSTOMY      VENA CAVA FILTER PLACEMENT          Medications Prior to Admission: Prior to Admission medications    Medication Sig Start Date End Date Taking? Authorizing Provider   calcium carbonate 600 MG TABS tablet Take 650 mg by mouth Twice a Week   Yes Historical Provider, MD   vitamin D (CHOLECALCIFEROL) 25 MCG (1000 UT) TABS tablet Take 1,000 Units by mouth daily   Yes Historical Provider, MD   nicotine (NICODERM CQ) 7 MG/24HR Place 1 patch onto the skin every 24 hours   Yes Historical Provider, MD   LACTOBACILLUS ACID-PECTIN PO Take 100 mg by mouth 3 times daily (with meals)   Yes Historical Provider, MD   cephALEXin (KEFLEX) 500 MG capsule Take 500 mg by mouth in the morning and at bedtime For 4 days   Yes Historical Provider, MD   dextromethorphan-guaiFENesin (MUCINEX DM)  MG per extended release tablet Take 2 tablets by mouth daily Reports drainage from her sinuses   Yes Historical Provider, MD   promethazine (PHENERGAN) 12.5 MG tablet Take 12.5 mg by mouth every 8 hours as needed for Nausea   Yes Historical Provider, MD   triamcinolone (KENALOG) 0.025 % ointment Apply 1 Applicatorful topically 2 times daily Apply topically 2 times daily. Yes Historical Provider, MD   hydroxychloroquine (PLAQUENIL) 200 MG tablet Take by mouth daily    Historical Provider, MD   aspirin (ASPIR-LOW) 81 MG EC tablet Take 1 tablet by mouth daily. 10/22/14   Leah Sheffield DPM   levothyroxine (LEVOTHROID) 112 MCG tablet Take 1 tablet by mouth Daily. 10/22/14   Woo Summers,    b complex vitamins capsule Take 1 capsule by mouth Twice a Week    Historical Provider, MD        Allergies:     Ertapenem, Codeine, and Nubain [nalbuphine hcl]    Social History:     Tobacco:    reports that she quit smoking about 12 years ago. She has a 18.50 pack-year smoking history. She has never used smokeless tobacco.  Alcohol:      reports current alcohol use. Drug Use:  reports no history of drug use.     Family History:     Family History   Problem Relation Age of Onset    High Blood Pressure Mother Stroke Mother     Substance Abuse Mother     Cancer Father     Heart Disease Father     High Blood Pressure Sister     Substance Abuse Brother     Stroke Maternal Grandmother     Stroke Paternal Grandfather        Review of Systems:     Positive and Negative as described in HPI. CONSTITUTIONAL:  negative for fevers, chills, sweats, fatigue, weight loss  HEENT:  negative for vision, hearing changes, runny nose, throat pain  RESPIRATORY:  negative for shortness of breath, cough, congestion, wheezing. CARDIOVASCULAR:  negative for chest pain, palpitations. GASTROINTESTINAL:  negative for nausea, vomiting, diarrhea, constipation, change in bowel habits, abdominal pain   GENITOURINARY:  negative for difficulty of urination, burning with urination, frequency   INTEGUMENT:  negative for rash, skin lesions, easy bruising   HEMATOLOGIC/LYMPHATIC:  negative for swelling/edema   ALLERGIC/IMMUNOLOGIC:  negative for urticaria , itching  ENDOCRINE:  negative increase in drinking, increase in urination, hot or cold intolerance  MUSCULOSKELETAL:  post op right  hip pain, negative joint pains, muscle aches, swelling of joints  NEUROLOGICAL:  negative for headaches, dizziness, lightheadedness, numbness, pain, tingling extremities      Physical Exam:     BP (!) 151/82   Pulse 78   Temp 97.8 °F (36.6 °C) (Oral)   Resp 14   Ht 5' 3\" (1.6 m)   Wt 134 lb 6.4 oz (61 kg)   SpO2 97%   BMI 23.81 kg/m²   Temp (24hrs), Av.2 °F (36.8 °C), Min:97.8 °F (36.6 °C), Max:98.4 °F (36.9 °C)    No results for input(s): POCGLU in the last 72 hours. No intake or output data in the 24 hours ending 22 1128    General Appearance:  alert, well appearing, and in no acute distress  Head:  normocephalic, atraumatic.   Eye: no icterus, redness, pupils equal and reactive, extraocular eye movements intact, conjunctiva clear  Ear: normal external ear, no discharge, hearing intact  Nose:  no drainage noted  Mouth: mucous membranes moist  Neck: supple, no carotid bruits, thyroid not palpable  Lungs: Bilateral equal air entry, clear to ausculation, no wheezing, rales or rhonchi, normal effort  Cardiovascular: normal rate, regular rhythm, no murmur, gallop, rub.   Abdomen: Soft, nontender, nondistended, normal bowel sounds, no hepatomegaly or splenomegaly  Neurologic: There are no new focal motor or sensory deficits, normal muscle tone and bulk, no abnormal sensation, normal speech, cranial nerves II through XII grossly intact  Skin: No gross lesions, rashes, bruising or bleeding on exposed skin area  Extremities: right hip pain and restriction ROM,  peripheral pulses palpable, no pedal edema or calf pain with palpation  Psych: normal affect    Investigations:      Laboratory Testing:  Recent Results (from the past 24 hour(s))   Basic Metabolic Panel w/ Reflex to MG    Collection Time: 09/16/22  6:25 AM   Result Value Ref Range    Glucose 77 70 - 99 mg/dL    BUN 19 8 - 23 mg/dL    Creatinine 1.02 (H) 0.50 - 0.90 mg/dL    Calcium 8.7 8.6 - 10.4 mg/dL    Sodium 131 (L) 135 - 144 mmol/L    Potassium 4.3 3.7 - 5.3 mmol/L    Chloride 107 98 - 107 mmol/L    CO2 14 (L) 20 - 31 mmol/L    Anion Gap 10 9 - 17 mmol/L    GFR Non-African American 53 (L) >60 mL/min    GFR African American >60 >60 mL/min    GFR Comment         Hepatic function panel    Collection Time: 09/16/22  6:25 AM   Result Value Ref Range    Albumin 2.0 (L) 3.5 - 5.2 g/dL    Alkaline Phosphatase 61 35 - 104 U/L    ALT 9 5 - 33 U/L    AST 21 <32 U/L    Total Bilirubin 0.4 0.3 - 1.2 mg/dL    Bilirubin, Direct 0.1 <0.31 mg/dL    Bilirubin, Indirect 0.3 0.00 - 1.00 mg/dL    Total Protein 5.6 (L) 6.4 - 8.3 g/dL   CBC auto differential    Collection Time: 09/16/22  6:25 AM   Result Value Ref Range    WBC 5.6 3.5 - 11.0 k/uL    RBC 2.95 (L) 4.0 - 5.2 m/uL    Hemoglobin 9.0 (L) 12.0 - 16.0 g/dL    Hematocrit 27.7 (L) 36 - 46 %    MCV 93.7 80 - 100 fL    MCH 30.4 26 - 34 pg    MCHC 32.5 31 - 37 g/dL    RDW

## 2022-09-16 NOTE — CONSULTS
Comprehensive Nutrition Assessment    Type and Reason for Visit:  Initial, Consult (Evaluate and Treat)    Nutrition Recommendations/Plan:   Continue current diet. Pt may have Ensure supplements from home. Monitor wt. Malnutrition Assessment:  Malnutrition Status: At risk for malnutrition (Comment) (09/16/22 1119)    Context:  Acute Illness     Findings of the 6 clinical characteristics of malnutrition:  Energy Intake:  Mild decrease in energy intake (Comment)  Weight Loss:  Unable to assess     Body Fat Loss:  Unable to assess     Muscle Mass Loss:  Unable to assess    Fluid Accumulation:  Mild (Likely related to surgery) Extremities   Strength:   Not measured    Nutrition Assessment:    Pt admitted to rehab due to ADL dysfunction secondary to right hip fracture with IM nail fixation on 9/9. Pt states she has been eating okay and son is bringing in Ensure for her as she drinks this at home; declined offer of rehab provision of Ensure. Denies any issues with dysphagia. Nutrition Related Findings:    Edema: +2 RLE, LLE. Na: 131. Other labs and meds reviewed. Hx: GERD. Wound Type: Multiple, Surgical Incision       Current Nutrition Intake & Therapies:    Average Meal Intake: 51-75%, %     ADULT DIET; Regular    Anthropometric Measures:  Height: 5' 3\" (160 cm)  Ideal Body Weight (IBW): 115 lbs (52 kg)    Admission Body Weight: 134 lb 7.7 oz (61 kg)  Current Body Weight: 134 lb 7.7 oz (61 kg), 116.9 % IBW.  Weight Source: Not Specified  Current BMI (kg/m2): 23.8  Usual Body Weight: 115 lb (52.2 kg) (per pt; states wt had dropped down to 107lb recently)  % Weight Change (Calculated): 16.9                    BMI Categories: Normal Weight (BMI 22.0 to 24.9) age over 72    Estimated Daily Nutrient Needs:  Energy Requirements Based On: Formula  Weight Used for Energy Requirements: Admission  Energy (kcal/day): 7399-6053 based on [facility] with 1.2-1.3 factor  Weight Used for Protein Requirements: Admission  Protein (g/day): 79-92 based on 1.3-1.5 gm per kg      Nutrition Diagnosis:   Increased nutrient needs related to  (healing) as evidenced by wounds    Nutrition Interventions:   Food and/or Nutrient Delivery: Continue Current Diet (Oral nutrition supplements from home)  Nutrition Education/Counseling: No recommendation at this time  Coordination of Nutrition Care: Continue to monitor while inpatient       Goals:     Goals: PO intake 75% or greater       Nutrition Monitoring and Evaluation:   Behavioral-Environmental Outcomes: None Identified  Food/Nutrient Intake Outcomes: Food and Nutrient Intake, Supplement Intake  Physical Signs/Symptoms Outcomes: Biochemical Data, Fluid Status or Edema, Skin, Weight    Discharge Planning:     Too soon to determine     Anita Stewart RD, LD  Contact: [phone]

## 2022-09-16 NOTE — PLAN OF CARE
Problem: Discharge Planning  Goal: Discharge to home or other facility with appropriate resources  9/16/2022 0444 by Brigitte Cifuentes RN  Outcome: Progressing     Problem: Safety - Adult  Goal: Free from fall injury  9/16/2022 0444 by Brigitte Cifuentes RN  Outcome: Progressing  Flowsheets (Taken 9/15/2022 2349)  Free From Fall Injury: Harsha Buck family/caregiver on patient safety     Problem: ABCDS Injury Assessment  Goal: Absence of physical injury  9/16/2022 0444 by Brigitte Cifuentes RN  Outcome: Progressing  Flowsheets (Taken 9/15/2022 2349)  Absence of Physical Injury: Implement safety measures based on patient assessment

## 2022-09-16 NOTE — CARE COORDINATION
IRF 4.0      : Ethnicity - No, not of ,  or [place] of origin     A200: Race - White     [id number]: Language - Preferred Language: English     M0542L: No  needed or wanted.  Transportation - see IRF TICO flowsheet      Social Isolation - see IRF TICO flowsheet      Pain effect on sleep - see IRF TICO flowsheet      Pain interference with therapy activities - see IRF TICO flowsheet      Pain interference with day to day activities - see IRF TICO flowsheet      High Risk Drug Classes -   F.  Antibiotic - Indication noted  H. Opioid - Indication noted     Cancer Treatments: None      Respiratory Therapies:   None     Other:   H1: IV Medications - none  Transfusions - none  Dialysis - not applicable  IV access - none

## 2022-09-16 NOTE — PROGRESS NOTES
Physical Therapy  Facility/Department: [facility] ACUTE REHAB  Rehabilitation Physical Therapy     NAME: Blanca Ling  : 1945 (78 y.o.)  MRN: [id number]  CODE STATUS: Full Code    Date of Service: 22      Past Medical History:   Diagnosis Date    CKD (chronic kidney disease) stage 3, GFR 30-59 ml/min     Dehydration     Dysphagia     GERD (gastroesophageal reflux disease)     [place] filter in place     History of DVT (deep vein thrombosis)     Hx: UTI (urinary tract infection)     Hypertension     Hyperthyroidism     XRT    Hyperthyroidism     Hypothyroidism     due to Rx for Graves disease    Iron deficiency anemia     Lupus (HCC)     Metabolic acidosis     Pharyngitis     Pulmonary embolism (HCC)     Pyelonephritis     Pyelonephritis     Renal abscess     Right kidney mass     Seizures ([address]     SLE ([facility] [place] encephalitis)      Past Surgical History:   Procedure Laterality Date    APPENDECTOMY      BUNIONECTOMY      BUNIONECTOMY      COLONOSCOPY      HYSTERECTOMY      REVISION COLOSTOMY      VENA CAVA FILTER PLACEMENT         Chart Reviewed: Yes  Diagnosis: Closed R Hip Fracture; S/P R IM nailing 22    Restrictions:  Restrictions/Precautions: Weight Bearing; Fall Risk;General Precautions; Up as Tolerated  Lower Extremity Weight Bearing Restrictions  Right Lower Extremity Weight Bearing: Weight Bearing As Tolerated (FWBAT)  Position Activity Restriction  Other position/activity restrictions: RLE FBWAT     SUBJECTIVE  Subjective: patient agreeable to therapy, note swelling, patient states unable to place her shoes on         OBJECTIVE  Vision  Vision: Within Functional Limits    Hearing  Hearing: Within functional limits            Functional Mobility  Bed mobility  Bridging: Contact guard assistance  Rolling to Left: Minimal assistance  Rolling to Right: Minimal assistance  Supine to Sit: Minimal assistance  Sit to Supine: Minimal assistance  Scooting: Minimal assistance  Transfers  Sit to Stand: Contact guard assistance  Stand to sit: Contact guard assistance  Stand Pivot Transfers: Contact guard assistance  Comment: RW for all transfer, pt demos safe hands placement without cueing needed. Environmental Mobility  Ambulation  Surface: level tile  Device: Rolling Walker  Other Apparatus:  (Writer Follows with W/C)  Assistance: Contact guard assistance  Quality of Gait: Antalgic, Slow but steady. Moderate BUE support on RW. Dec Heel contact/toe push-off bilaterally  Gait Deviations: Slow Natalya;Decreased step length; Increased STEVE; Decreased step height  Distance: 55 feet; 40 feet  Comments: VCs for RW proximity and posture with fair carryover demo. More Ambulation?: Yes  Stairs/Curb  Stairs?: Yes  Stairs  # Steps : 4  Stairs Height: 4\"  Rails: Bilateral  Device: No Device  Assistance: Contact guard assistance  Comment: With Cues/Edu/Demo pt demos good carry over sequence and stepping pattern to maximize safety    PT Exercises  Exercise Treatment: supine BLE    ASSESSMENT       Activity Tolerance  Activity Tolerance: Patient limited by pain     Patient Goals   Patient goals : To walk better  Short Term Goals  Time Frame for Short term goals: 5 days  Short term goal 1: Pt to demo All bed mobility with CGA using Gait belt as leglift assist as needed  Short term goal 2: pt to demo all transfers with RW and SBA  Short term goal 3: pt to ambulate 150' with RW and SBA  Short term goal 4: pt to negotiate 5 steps with rails and AD as needed to allow for safe home access.   Short term goal 5: pt to identify effective methods for reducing pain and swelling (including proper LE Elevation techinque/Icing Technique)  Additional Goals?: Yes  Short Term Goal 6: Pt to demonstrate good technique for HEP exercises to improve pt's independence with self-care exercise routine  Long Term Goals  Time Frame for Long term goals : Until D/C  Long term goal 1: Pt to demo All bed mobility independently using Gait belt as leglift assist as needed  Long term goal 2: pt to demo all transfers with RW Mod I  Long term goal 3: pt to ambulate 150' with RW Mod I on level surfaces, 10-30' ramped surface RW Supervision, and Outdoor terrain with RW and SBA  Long term goal 4: pt to negotiate 5 steps with rails, SBA, and AD as needed to allow for safe home access. Long term goal 5: pt to demo simulated or actual car transfer with safe technique, appropriate device, and SBA  Additional Goals?: Yes  Long term goal 6: Pt to Improve Distance on 2MWT By 113' to demo Minimal Detectable Change in walking speed. Goal is to walk 198-200' in 2 Minutes. PLAN OF CARE  Frequency: 1-2 treatment sessions per day, 5-7 days per week  Plan  Plan:  minutes of therapy at least 5 out of 7 days a week  Specific Instructions for Next Treatment: Progress ambulation;  Nu-step; AROM/AAROM  Safety Devices  Type of Devices: Chair alarm in place;Call light within reach  Restraints  Restraints Initially in Place: No    EDUCATION  Education  Education Given To: Patient  Education Provided: Transfer Training;Mobility Training  Education Provided Comments: transfers and therex  Education Method: Demonstration;Verbal  Barriers to Learning: None  Education Outcome: Verbalized understanding           Therapy Time   Individual Concurrent Group Co-treatment   Time In 1404         Time Out 1437         Minutes [address] [facility] [address] [place], 09/16/22 at 4:50 PM

## 2022-09-17 PROCEDURE — 6370000000 HC RX 637 (ALT 250 FOR IP): Performed by: INTERNAL MEDICINE

## 2022-09-17 PROCEDURE — 51798 US URINE CAPACITY MEASURE: CPT

## 2022-09-17 PROCEDURE — 6370000000 HC RX 637 (ALT 250 FOR IP): Performed by: PHYSICAL MEDICINE & REHABILITATION

## 2022-09-17 PROCEDURE — 6370000000 HC RX 637 (ALT 250 FOR IP): Performed by: NURSE PRACTITIONER

## 2022-09-17 PROCEDURE — 97530 THERAPEUTIC ACTIVITIES: CPT

## 2022-09-17 PROCEDURE — 99232 SBSQ HOSP IP/OBS MODERATE 35: CPT | Performed by: PHYSICAL MEDICINE & REHABILITATION

## 2022-09-17 PROCEDURE — 97110 THERAPEUTIC EXERCISES: CPT

## 2022-09-17 PROCEDURE — 99231 SBSQ HOSP IP/OBS SF/LOW 25: CPT | Performed by: INTERNAL MEDICINE

## 2022-09-17 PROCEDURE — 97535 SELF CARE MNGMENT TRAINING: CPT

## 2022-09-17 PROCEDURE — 97116 GAIT TRAINING THERAPY: CPT

## 2022-09-17 PROCEDURE — 1180000000 HC REHAB R&B

## 2022-09-17 RX ORDER — ENOXAPARIN SODIUM 100 MG/ML
40 INJECTION SUBCUTANEOUS DAILY
Status: DISCONTINUED | OUTPATIENT
Start: 2022-09-18 | End: 2022-09-23 | Stop reason: HOSPADM

## 2022-09-17 RX ADMIN — Medication 1 CAPSULE: at 16:27

## 2022-09-17 RX ADMIN — SENNOSIDES 17.2 MG: 8.6 TABLET, FILM COATED ORAL at 11:40

## 2022-09-17 RX ADMIN — OXYCODONE HYDROCHLORIDE AND ACETAMINOPHEN 1 TABLET: 5; 325 TABLET ORAL at 16:27

## 2022-09-17 RX ADMIN — ASPIRIN 81 MG 81 MG: 81 TABLET ORAL at 07:38

## 2022-09-17 RX ADMIN — Medication 1000 UNITS: at 07:38

## 2022-09-17 RX ADMIN — POLYETHYLENE GLYCOL 3350 17 G: 17 POWDER, FOR SOLUTION ORAL at 07:38

## 2022-09-17 RX ADMIN — LEVOTHYROXINE SODIUM 112 MCG: 112 TABLET ORAL at 06:12

## 2022-09-17 RX ADMIN — CEPHALEXIN 500 MG: 500 CAPSULE ORAL at 07:38

## 2022-09-17 RX ADMIN — AMLODIPINE BESYLATE 5 MG: 5 TABLET ORAL at 07:38

## 2022-09-17 RX ADMIN — Medication 1 CAPSULE: at 11:40

## 2022-09-17 RX ADMIN — HYDROXYCHLOROQUINE SULFATE 200 MG: 200 TABLET ORAL at 07:51

## 2022-09-17 RX ADMIN — OXYCODONE HYDROCHLORIDE AND ACETAMINOPHEN 1 TABLET: 5; 325 TABLET ORAL at 08:32

## 2022-09-17 RX ADMIN — CEPHALEXIN 500 MG: 500 CAPSULE ORAL at 20:35

## 2022-09-17 RX ADMIN — GUAIFENESIN 1200 MG: 600 TABLET, EXTENDED RELEASE ORAL at 07:38

## 2022-09-17 RX ADMIN — FLUOCINONIDE: 0.5 CREAM TOPICAL at 07:50

## 2022-09-17 RX ADMIN — Medication 1 CAPSULE: at 07:38

## 2022-09-17 ASSESSMENT — PAIN SCALES - GENERAL
PAINLEVEL_OUTOF10: 7
PAINLEVEL_OUTOF10: 5
PAINLEVEL_OUTOF10: 8
PAINLEVEL_OUTOF10: 4
PAINLEVEL_OUTOF10: 7
PAINLEVEL_OUTOF10: 8
PAINLEVEL_OUTOF10: 7
PAINLEVEL_OUTOF10: 7

## 2022-09-17 ASSESSMENT — 9 HOLE PEG TEST
TEST_RESULT: FUNCTIONAL
TESTTIME_SECONDS: 30
TEST_RESULT: IMPAIRED
TESTTIME_SECONDS: 34

## 2022-09-17 ASSESSMENT — PAIN DESCRIPTION - FREQUENCY: FREQUENCY: CONTINUOUS

## 2022-09-17 ASSESSMENT — PAIN DESCRIPTION - ORIENTATION
ORIENTATION: RIGHT

## 2022-09-17 ASSESSMENT — PAIN DESCRIPTION - LOCATION
LOCATION: HIP;LEG
LOCATION: HIP;LEG
LOCATION: HIP

## 2022-09-17 ASSESSMENT — PAIN - FUNCTIONAL ASSESSMENT: PAIN_FUNCTIONAL_ASSESSMENT: PREVENTS OR INTERFERES SOME ACTIVE ACTIVITIES AND ADLS

## 2022-09-17 ASSESSMENT — PAIN DESCRIPTION - PAIN TYPE
TYPE: ACUTE PAIN
TYPE: ACUTE PAIN

## 2022-09-17 ASSESSMENT — PAIN DESCRIPTION - DESCRIPTORS
DESCRIPTORS: ACHING
DESCRIPTORS: ACHING

## 2022-09-17 NOTE — PLAN OF CARE
Problem: Discharge Planning  Goal: Discharge to home or other facility with appropriate resources  9/17/2022 1728 by Derek Magaña RN  Outcome: Progressing  9/17/2022 1338 by Ck Sequeira  Outcome: Progressing  Flowsheets (Taken 9/17/2022 1334 by Derek Magaña RN)  Discharge to home or other facility with appropriate resources:   Identify barriers to discharge with patient and caregiver   Arrange for needed discharge resources and transportation as appropriate   Identify discharge learning needs (meds, wound care, etc)   Refer to discharge planning if patient needs post-hospital services based on physician order or complex needs related to functional status, cognitive ability or social support system     Problem: Safety - Adult  Goal: Free from fall injury  9/17/2022 1728 by Derek Magaña RN  Outcome: Progressing  Flowsheets (Taken 9/17/2022 1349)  Free From Fall Injury: Instruct family/caregiver on patient safety  9/17/2022 1338 by Ck Sequeira  Outcome: Progressing     Problem: ABCDS Injury Assessment  Goal: Absence of physical injury  9/17/2022 1728 by Derek Magaña RN  Outcome: Progressing  Flowsheets (Taken 9/17/2022 1349)  Absence of Physical Injury: Implement safety measures based on patient assessment  9/17/2022 1338 by Ck Sequeira  Outcome: Progressing     Problem: Nutrition Deficit:  Goal: Optimize nutritional status  9/17/2022 1728 by Derek Magaña RN  Outcome: Progressing  9/17/2022 1338 by Ck Sequeira  Outcome: Progressing     Problem: Pain  Goal: Verbalizes/displays adequate comfort level or baseline comfort level  9/17/2022 1728 by Derek Magaña RN  Outcome: Progressing  9/17/2022 1338 by Ck Sequeira  Outcome: Progressing     Problem: Skin/Tissue Integrity  Goal: Absence of new skin breakdown  Description: 1. Monitor for areas of redness and/or skin breakdown  2. Assess vascular access sites hourly  3. Every 4-6 hours minimum:  Change oxygen saturation probe site  4. Every 4-6 hours:   If on nasal continuous positive airway pressure, respiratory therapy assess nares and determine need for appliance change or resting period.   9/17/2022 1728 by Yasmany Farias RN  Outcome: Progressing  9/17/2022 1338 by Loreta Norman  Outcome: Progressing

## 2022-09-17 NOTE — PLAN OF CARE
Individualized Plan of Care  [facility]    Rehabilitation physician: Dr. Filiberto Patterson Date: 9/15/2022     Rehabilitation Diagnosis: Fracture of hip, closed, right, sequela [S72.001S]     Rehabilitation impairments: self care, mobility, motor dysfunction, bowel/bladder management, and pain management    Factors facilitating achievement of predicted outcomes: Family support, Motivated, Cooperative, Pleasant, Sense of humor, and Good insight into deficits  Barriers to the achievement of predicted outcomes: Pain, Lower extremity weakness, Long standing deficits, Medical complications, Skin Care, and Medication managment    Patient Goals: Improve independence with mobility, Improvement of mobility at a wheelchair level, Increase overall strength and endurance, Increase balance, Increase endurance, Increase independence with activities of daily living, Improve cognition, Increase self-awareness, Increase safety awareness, Increase community integration, Increase socialization, Functional communication with caregivers, Integrate appropriate pain management plan, Assure adequate nutritional option for discharge, Continence of bowel and bladder, and Provide appropriate patient and family education      NURSING:  Nursing goals for Rashard Delgado while on the rehabilitation unit will include:  Continence of bowel and bladder, Adequate number of bowel movements, Urinate with no urinary retention >300ml in bladder, Complete bladder protocol with kunz removal, Maintain O2 SATs at an acceptable level during stay, Effective pain management while on the rehabilitation unit, Establish adequate pain control plan for discharge, Absence of skin breakdown while on the rehabilitation unit, Improved skin integrity via assessments including wound measurements, Avoidance of any hospital acquired infections, No signs/symptoms of infection at the wound site, Freedom from injury during hospitalization, and Complete education with patient/family with understanding demonstrated regarding disease process and resultant impairment     In order to achieve these goals, nursing interventions may include bowel/bladder training, education for medical assistive devices, medication education, O2 saturation management, energy conservation, stress management techniques, fall prevention, alarms protocol, seating and positioning, skin/wound care, pressure relief instruction, dressing changes, infection protection, DVT prophylaxis, assistance with safe transfers , and/or assistance with bathroom activities and hygiene. PHYSICAL THERAPY:  Goals:        Short Term Goals  Time Frame for Short term goals: 5 days  Short term goal 1: Pt to demo All bed mobility with CGA using Gait belt as leglift assist as needed  Short term goal 2: pt to demo all transfers with RW and SBA  Short term goal 3: pt to ambulate 150' with RW and SBA  Short term goal 4: pt to negotiate 5 steps with rails and AD as needed to allow for safe home access. Short term goal 5: pt to identify effective methods for reducing pain and swelling (including proper LE Elevation techinque/Icing Technique)  Additional Goals?: Yes  Short Term Goal 6: Pt to demonstrate good technique for HEP exercises to improve pt's independence with self-care exercise routine  Long Term Goals  Time Frame for Long term goals : Until D/C  Long term goal 1: Pt to demo All bed mobility independently using Gait belt as leglift assist as needed  Long term goal 2: pt to demo all transfers with RW Mod I  Long term goal 3: pt to ambulate 150' with RW Mod I on level surfaces, 10-30' ramped surface RW Supervision, and Outdoor terrain with RW and SBA  Long term goal 4: pt to negotiate 5 steps with rails, SBA, and AD as needed to allow for safe home access.   Long term goal 5: pt to demo simulated or actual car transfer with safe technique, appropriate device, and SBA  Additional Goals?: Yes  Long term goal 6: Pt to Improve Distance on 2MWT By 113' to demo Minimal Detectable Change in walking speed. Goal is to walk 198-200' in 2 Minutes. Plan of Care: Pt to be seen by physical therapy services 1 Hour 30 Minutes per day at least 5 out of 7 days per week     Anticipated interventions may include therapeutic exercises, gait training, neuromuscular re-ed, transfer training, community reintegration, bed mobility, w/c mobility and training. OCCUPATIONAL THERAPY:  Goals:             Short Term Goals  Time Frame for Short term goals: One week  Short Term Goal 1: Pt will complete LB dressing/bathing/toileting tasks with CGA and Good safety to improve IND and safety with self care tasks. Short Term Goal 2: Pt will complete UB dressing/bathing tasks with supervision and Good safety to improve IND and safety with self care tasks. Short Term Goal 3: Pt will complete functional transfers/mobility with CGA and Good safety to improve IND and safety with self care and mobility tasks. Short Term Goal 4: Pt will tolerate standing for 6+ minutes during functional activity of choice with CGA and Good safety to improve IND and safety with self care and mobility tasks. Short Term Goal 5: Pt will verbalize/demonstrate Good understanding of at least 3 AD/DME/modified techniques to improve IND and safety with self care and mobility tasks. Additional Goals?: Yes  Short Term Goal 6: OTR/EM to further assess FMC/ strength. Short Term Goal 7: Pt will participate in 30+ minutes of therapeutic exercises/functional activities to improve IND and safety with self care and mobility tasks. Long Term Goals  Time Frame for Long term goals : By discharge  Long Term Goal 1: Pt will complete BADLs with modified IND and Good safety  to improve IND and safety with self care tasks.   Long Term Goal 2: Pt will complete functional transfers/mobility with modified IND and Good safety to improve IND and safety with self care and mobility tasks.  Long Term Goal 3: Pt will tolerate standing for 12+ minutes during functional activity of choice with modified IND and Good safety to improve IND and safety with self care and mobility tasks. Long Term Goal 4: Pt will verbalize/demonstrate Good understanding of at least 3 fall prevention/home safety strategies to improve IND and safety with self care and mobility tasks. Long Term Goal 5: Pt will complete medication management task with 100% accuracy with use of adaptive strategies to improve safety. Additional Goals?: Yes  Long Term Goal 6: Pt will complete light housekeeping/meal preparation task with supervision and Good safety to improve IND and safety with ADL/IADL tasks. Plan of Care: Patient to be seen by occupational therapy services 1 Hour 30 Minutes per day at least 5 out of 7 days per week     Anticipated interventions may include ADL and IADL retraining, strengthening, safety education and training, patient/caregiver education and training, equipment evaluation/ training/procurement, neuromuscular reeducation, wheelchair mobility training. SPEECH THERAPY:   Goals:   N/A    CASE MANAGEMENT:  Goals:   Assist patient/family with discharge planning, patient/family counseling,  and coordination with insurance during the inpatient rehabilitation stay. Other members of the multidisciplinary rehabilitation team that will be involved in the patient's plan of care include recreational therapy, dietary, respiratory therapy, and neuropsychology.     Medical issues being managed closely and that require 24 hour availability of a physician:  Bowel/Bladder function, Wound care, Pain management, Infection protection, DVT prophylaxis, Fall precautions, Fluid/Electrolyte balance, Nutritional status, Respiratory needs, Anemia, and History of heart disease                                           Physician anticipated functional outcomes: Improved independence with functional measures   Estimated length of stay for this admission 10 days  Medical Prognosis: Fair  Anticipated disposition: Home. The potential to achieve the above medical and rehabilitative goals is good. This plan of care has been developed with the assistance and input of the multidisciplinary rehabilitation team.  The plan was reviewed with the patient on 9/17/2022. The patient has had the opportunity to provide input to the therapy team.    I have reviewed this Individualized Plan of Care and agree with its contents. Above documentation has been expanded, modified, adjusted to reflect the findings of my evaluations and goals for the patient. Physician:  Electronically signed by Bobbi Fernandes. Neo Montanez MD on 9/17/22 at 12:38 PM EDT

## 2022-09-17 NOTE — PLAN OF CARE
Problem: Discharge Planning  Goal: Discharge to home or other facility with appropriate resources  Outcome: Progressing  Note: Discharge plan to be decided     Problem: Safety - Adult  Goal: Free from fall injury  Outcome: Progressing  Note: Patient remains free from falls     Problem: ABCDS Injury Assessment  Goal: Absence of physical injury  Outcome: Progressing  Note: No new injury noted     Problem: Pain  Goal: Verbalizes/displays adequate comfort level or baseline comfort level  Outcome: Progressing  Note: Medicated with analgesics as ordered

## 2022-09-17 NOTE — PROGRESS NOTES
Limits  Orientation Level: Oriented X4    Cognition  Overall Cognitive Status: WFL    Activities of Daily Living  Feeding  Assistance Level: Independent  Skilled Clinical Factors: per pt report    Grooming/Oral Hygiene  Assistance Level: Set-up  Skilled Clinical Factors: seated in w/c       Lower Extremity Bathing  Equipment Provided: Long-handled sponge  Assistance Level: Contact guard assist  Skilled Clinical Factors: CGA in stance with RW for suzi care, seated using LHS for BLEs vc for proper use of AE    Upper Extremity Dressing  Assistance Level: Set-up  Skilled Clinical Factors: OH shirt    Lower Extremity Dressing  Equipment Provided: Reachers  Assistance Level: Minimal assistance  Skilled Clinical Factors: vc to utilize reacher to assist doffing/donning pants/under garments off/over rick feet for increased indep and ease, CGA in stance with RW to bring LB clothing over/off hips    Putting On/Taking Off Footwear  Equipment Provided: Reachers;Sock aid  Assistance Level: Minimal assistance  Skilled Clinical Factors: verbal instruction and demo on AE reacher and sock aid to aide in putting on/taking off footies for increased indep, pt able to doff socks with reacher slight assist doffing L footie off heel,    Toileting  Assistance Level: Contact guard assist  Skilled Clinical Factors: CGA for clothing mgt and suzi care using RW for UE support, no LOB    Toilet Transfers  Technique:  (ambulating with RW)  Equipment: Grab bars;Standard toilet  Additional Factors: Verbal cues;Cues for hand placement; With handrails  Assistance Level: Minimal assistance; Increased time to complete  Skilled Clinical Factors: vc for hand placemenrt and transfer seqeunce for safety no LOB observed    Mobility    Supine to Sit  Assistance Level: Stand by assist  Skilled Clinical Factors: HOB elevated and bed rails used  Scooting  Assistance Level: Stand by assist  Skilled Clinical Factors: increased time req to bring hips to EOB       Sit to Stand  Assistance Level: Minimal assistance  Skilled Clinical Factors: vc for hand placement for increased safety,  Stand to Sit  Assistance Level: Contact guard assist  Skilled Clinical Factors: vc for hand placement for controlled sit        Functional Mobility  Device: Rolling walker  Activity: To/From bathroom  Assistance Level: Contact guard assist  Skilled Clinical Factors: slow and shuffled gait vc for increased stride height and length, no LOB      OT Exercises  Exercise Treatment: PM: BUE exercises with 2# weight facilitated in all tolerated planes for 15 reps to support strength needed to engage in functional tasks safely and indep. Pt demo'd G tolerance with activity       Left Hand Strength -  (lbs)  Handle Setting 2: 35.3 (99,79,65) (NORMs 32-54#)        Right Hand Strength -  (lbs)  Handle Setting 2: 37.3 (51,99,93) (NORMs 32-54#)        Fine Motor Skills  Left 9-Hole Peg Test: Impaired  Left 9 Hole Peg Test Time (secs): 34 (NORMs: 21-30s)  Right 9-Hole Peg Test: Functional  Right 9 Hole Peg Test Time (secs): 30 (NORMs: 21-30s)       Assessment  Assessment  Activity Tolerance: Patient limited by endurance; Patient limited by pain  Discharge Recommendations: Home with assist PRN;Patient would benefit from continued therapy after discharge    Patient Education  Education  Education Given To: Patient  Education Provided: Role of Therapy;Plan of Care;Precautions; Safety;ADL Function;Mobility Training;Transfer Training  Education Method: Verbal;Demonstration  Barriers to Learning: None  Education Outcome: Verbalized understanding         Goals  Patient Goals   Patient goals : \"To get as much accomplished as I can and to come out stronger to keep up with my jobs, my grandchildren, and the little ones\"  Short Term Goals  Time Frame for Short term goals:  One week  Short Term Goal 1: Pt will complete LB dressing/bathing/toileting tasks with CGA and Good safety to improve IND and safety with self care tasks.  Short Term Goal 2: Pt will complete UB dressing/bathing tasks with supervision and Good safety to improve IND and safety with self care tasks. Short Term Goal 3: Pt will complete functional transfers/mobility with CGA and Good safety to improve IND and safety with self care and mobility tasks. Short Term Goal 4: Pt will tolerate standing for 6+ minutes during functional activity of choice with CGA and Good safety to improve IND and safety with self care and mobility tasks. Short Term Goal 5: Pt will verbalize/demonstrate Good understanding of at least 3 AD/DME/modified techniques to improve IND and safety with self care and mobility tasks. Additional Goals?: Yes  Short Term Goal 6: OTR/EM to further assess FMC/ strength. Short Term Goal 7: Pt will participate in 30+ minutes of therapeutic exercises/functional activities to improve IND and safety with self care and mobility tasks. Long Term Goals  Time Frame for Long term goals : By discharge  Long Term Goal 1: Pt will complete BADLs with modified IND and Good safety  to improve IND and safety with self care tasks. Long Term Goal 2: Pt will complete functional transfers/mobility with modified IND and Good safety to improve IND and safety with self care and mobility tasks. Long Term Goal 3: Pt will tolerate standing for 12+ minutes during functional activity of choice with modified IND and Good safety to improve IND and safety with self care and mobility tasks. Long Term Goal 4: Pt will verbalize/demonstrate Good understanding of at least 3 fall prevention/home safety strategies to improve IND and safety with self care and mobility tasks. Long Term Goal 5: Pt will complete medication management task with 100% accuracy with use of adaptive strategies to improve safety.   Additional Goals?: Yes  Long Term Goal 6: Pt will complete light housekeeping/meal preparation task with supervision and Good safety to improve IND and safety with ADL/IADL tasks.    Plan  Plan  Times per Week: 5-7  Times per Day: Twice a day  Current Treatment Recommendations: Strengthening, ROM, Balance training, Functional mobility training, Endurance training, Pain management, Safety education & training, Patient/Caregiver education & training, Equipment evaluation, education, & procurement, Self-Care / ADL, Return to work related activity, Coordination training         09/17/22 0800 09/17/22 1435   OT Individual Minutes   Time In 0800 1435   Time Out 0900 1506   Minutes 60 31            Electronically signed by Theressa Bamberger, OTA on 9/17/22 at 3:17 PM EDT

## 2022-09-17 NOTE — PROGRESS NOTES
Physical Therapy  Facility/Department: [facility]      NAME: Cresencio Alonso  : 1945 (78 y.o.)  MRN: [id number]  CODE STATUS: Full Code    Date of Service: 22      Past Medical History:   Diagnosis Date    CKD (chronic kidney disease) stage 3, GFR 30-59 ml/min     Dehydration     Dysphagia     GERD (gastroesophageal reflux disease)     [place] filter in place     History of DVT (deep vein thrombosis)     Hx: UTI (urinary tract infection)     Hypertension     Hyperthyroidism     XRT    Hyperthyroidism     Hypothyroidism     due to Rx for Graves disease    Iron deficiency anemia     Lupus (HCC)     Metabolic acidosis     Pharyngitis     Pulmonary embolism (HCC)     Pyelonephritis     Pyelonephritis     Renal abscess     Right kidney mass     Seizures ([address]     SLE ([place] encephalitis)      Past Surgical History:   Procedure Laterality Date    APPENDECTOMY      BUNIONECTOMY      BUNIONECTOMY      COLONOSCOPY      HYSTERECTOMY      REVISION COLOSTOMY      VENA CAVA FILTER PLACEMENT         Chart Reviewed: Yes  Patient assessed for rehabilitation services?: Yes  Additional Pertinent Hx: The patient is a 78 y.o. female, S/P mechanical fall at home with subsequent sustained R femur fracture, S/P Intramedullary trochanteric fixation nail to R Hip Performed by Dr. Frank Smith on 22  Family / Caregiver Present: No  Referring Practitioner: Dr. Jalil Rowe  Referral Date : 09/15/22  Diagnosis: Closed R Hip Fracture; S/P R IM nailing 22    Restrictions:  Restrictions/Precautions: Weight Bearing; Fall Risk;General Precautions; Up as Tolerated  Lower Extremity Weight Bearing Restrictions  Right Lower Extremity Weight Bearing: Weight Bearing As Tolerated (FWBAT)  Position Activity Restriction  Other position/activity restrictions: RLE FWBAT     SUBJECTIVE  Subjective: Rn and pt agreed to PT, pt up in W/C upon arrival in am and recliner in pm. Pt c/o \"a little\" pain but rates it 7-8/10 in am and 6/10 pain in pm. pt reports no increase in pain after therapy session    Functional Mobility  Bed mobility  Supine to Sit: Minimal assistance (Pt self assisted RLE off mat table)  Sit to Supine: Minimal assistance (Salima to elevate RLE onto mat table)  Scooting: Stand by assistance  Transfers  Sit to Stand: Contact guard assistance;Minimal Assistance  Stand to sit: Contact guard assistance  Stand Pivot Transfers: Contact guard assistance  Comment: Pt requires 2-3 attempts most stands or very minimal assist for sit to stand transfers; Intermittently demo's posterior lean upon standing  Balance  Posture: Good  Sitting - Static: Good  Sitting - Dynamic: Fair;+  Standing - Static: Fair (RW)  Standing - Dynamic: Fair;- (RW)  Comments: Standing balance assessed with RW, No LOB to report    Environmental Mobility  Ambulation  Surface: level tile  Device: Rolling Walker  Other Apparatus:  (Writer Follows with W/C)  Assistance: Contact guard assistance  Quality of Gait: Flexed posture, decreased STEVE, decreased step length  Gait Deviations: Slow Natalya;Decreased step length;Decreased step height  Distance: 120ft in am  Comments: vc's needed to correct quality of gait with fair follow through  More Ambulation?: Yes  Ambulation 2  Surface - 2: level tile  Device 2: Rolling Walker  Assistance 2: Contact guard assistance  Gait Deviations: Slow Natalya;Decreased step length;Decreased step height  Distance: 15ft x 2 in room, 70ft  Comments: Distance limited this session by sudden onset of dizziness. pt returned to sitting and reports dizziness subsided, RN notified  Stairs/Curb  Stairs?: No    PT Exercises  PROM Exercises: BLE gastroc stretches 30\"x 3  A/AROM Exercises: Supine RLE A/AAROM 10 x 2; Occassional assistance needed  Standing BLE AROM in // bars x 10 including heel raises, marches, HS curls.  Limited ROM noted on RLE  Circulation/Endurance Exercises: Nustep L1 x 10mins, Good tolerance noted  Static Sitting Balance Exercises: Standing balloon tap x 1 min, Static standing with single UE support reaching STEVE and crossing midline x 1min 35sec,  Standing balance in restroom w/o UE support CGA to aide/doff pants for toileting, no LOB during balance activities     ASSESSMENT     Activity Tolerance  Activity Tolerance: Patient limited by endurance; Patient limited by pain  Activity Tolerance Comments: Pt needs frequent rest breaks d/t fatigue and SOB    Assessment  Assessment: Pt presents with RLE ROM and Strength deficits impairing balance, safety, and tolerance for activity and mobility s/p R Femur IM Nailing to stabilize femur fracture after mechanical fall. The patient requires CGA with all OOB mobility using RW on level surfaces tolerating up to 120' in one bout of walking. The patient will benefit from continued PT services and Family training to address deficits and maximize safety and independence with mobility. Sudden brief onset of dizziness during pm ambulation that limited distance. RN notified  Treatment Diagnosis: Impaired functional mobility 2* Closed R Hip Fracture and s/p R Femur IM Nailing  Therapy Prognosis: Excellent  Decision Making: Medium Complexity  Exam: ROM, MMT, Balance, And functional mobility assessments; 2MWT  Clinical Presentation: Evolving; Pt is alert, cooperative, and pleasant throughout; Very motivated  Barriers to Learning: none  Treatment Initiated : Functional mobility training; AAROM/AROM BLE  Discharge Recommendations: Patient would benefit from continued therapy after discharge    CLINICAL IMPRESSION   Pt presents with RLE ROM and Strength deficits impairing balance, safety, and tolerance for activity and mobility s/p R Femur IM Nailing to stabilize femur fracture after mechanical fall. The patient requires CGA with all OOB mobility using RW on level surfaces tolerating up to 120' in one bout of walking.  The patient will benefit from continued PT services and Family training to address deficits and maximize safety and independence with mobility. GOALS  Patient Goals   Patient goals : To walk better  Short Term Goals  Time Frame for Short term goals: 5 days  Short term goal 1: Pt to demo All bed mobility with CGA using Gait belt as leglift assist as needed  Short term goal 2: pt to demo all transfers with RW and SBA  Short term goal 3: pt to ambulate 150' with RW and SBA  Short term goal 4: pt to negotiate 5 steps with rails and AD as needed to allow for safe home access. Short term goal 5: pt to identify effective methods for reducing pain and swelling (including proper LE Elevation techinque/Icing Technique)  Additional Goals?: Yes  Short Term Goal 6: Pt to demonstrate good technique for HEP exercises to improve pt's independence with self-care exercise routine  Long Term Goals  Time Frame for Long term goals : Until D/C  Long term goal 1: Pt to demo All bed mobility independently using Gait belt as leglift assist as needed  Long term goal 2: pt to demo all transfers with RW Mod I  Long term goal 3: pt to ambulate 150' with RW Mod I on level surfaces, 10-30' ramped surface RW Supervision, and Outdoor terrain with RW and SBA  Long term goal 4: pt to negotiate 5 steps with rails, SBA, and AD as needed to allow for safe home access. Long term goal 5: pt to demo simulated or actual car transfer with safe technique, appropriate device, and SBA  Additional Goals?: Yes  Long term goal 6: Pt to Improve Distance on 2MWT By 113' to demo Minimal Detectable Change in walking speed. Goal is to walk 198-200' in 2 Minutes.     PLAN OF CARE  Frequency: 1-2 treatment sessions per day, 5-7 days per week  Plan  Plan:  minutes of therapy at least 5 out of 7 days a week    EDUCATION  Education  Education Given To: Patient  Education Provided: Transfer Training;Mobility Training;Home Exercise Program  Education Provided Comments: transfers and therex  Education Method: Verbal  Barriers to Learning: None  Education Outcome: Verbalized understanding     09/17/22 0900 09/17/22 1400   PT Individual Minutes   Time In 0900 1400   Time Out 0959 1431   Minutes 59 31     Time coded minutes: 90 Minutes         Tova Garcia PTA, 09/17/22 at 4:11 PM

## 2022-09-17 NOTE — PLAN OF CARE
Problem: Discharge Planning  Goal: Discharge to home or other facility with appropriate resources  9/17/2022 1338 by Murray Pooja  Outcome: Progressing     Problem: Safety - Adult  Goal: Free from fall injury  9/17/2022 1338 by Murray Pooja  Outcome: Progressing     Problem: ABCDS Injury Assessment  Goal: Absence of physical injury  9/17/2022 1338 by Murray Pooja  Outcome: Progressing     Problem: Nutrition Deficit:  Goal: Optimize nutritional status  Outcome: Progressing     Problem: Pain  Goal: Verbalizes/displays adequate comfort level or baseline comfort level  9/17/2022 1338 by Murray Pooja  Outcome: Progressing     Problem: Skin/Tissue Integrity  Goal: Absence of new skin breakdown  Description: 1. Monitor for areas of redness and/or skin breakdown  2. Assess vascular access sites hourly  3. Every 4-6 hours minimum:  Change oxygen saturation probe site  4. Every 4-6 hours:  If on nasal continuous positive airway pressure, respiratory therapy assess nares and determine need for appliance change or resting period.   Outcome: Progressing

## 2022-09-17 NOTE — CONSULTS
[facility] Internal Medicine    CONSULTATION / HISTORY AND PHYSICAL EXAMINATION            Date:   9/17/2022  Patient name:  Michelle Dyer  Date of admission:  9/15/2022  2:31 PM  MRN:   [id number]  Account:  [de-identified]  YOB: 1945  PCP:    Donald Raedr DO  Room:   2617/2617-01  Code Status:    Full Code    Physician Requesting Consult: Mack Yepez MD    Reason for Consult:  Medical management    Chief Complaint:     No chief complaint on file. Debility secondary to right hip surgery    History Obtained From:     Patient, EMR, nursing staff    History of Present Illness:     44-year-old female admitted from outside hospital after ORIF for right hip fracture performed on 9/9.   Course complicated by anemia hemoglobin to 5.9 status post transfusion medical history significant for Sjogren's for which she is taking Plaquenil, CKD stage III antiphospholipid antibody syndrome, history of PE patient has IVC filter not on any AC      Past Medical History:     Past Medical History:   Diagnosis Date    CKD (chronic kidney disease) stage 3, GFR 30-59 ml/min     Dehydration     Dysphagia     GERD (gastroesophageal reflux disease)     [place] filter in place     History of DVT (deep vein thrombosis)     Hx: UTI (urinary tract infection)     Hypertension     Hyperthyroidism 1995    XRT    Hyperthyroidism     Hypothyroidism     due to Rx for Graves disease    Iron deficiency anemia     Lupus (HCC)     Metabolic acidosis     Pharyngitis     Pulmonary embolism (HCC)     Pyelonephritis     Pyelonephritis     Renal abscess     Right kidney mass     Seizures ([address]     SLE ([place] encephalitis)         Past Surgical History:     Past Surgical History:   Procedure Laterality Date    APPENDECTOMY      BUNIONECTOMY      BUNIONECTOMY      COLONOSCOPY      HYSTERECTOMY      REVISION COLOSTOMY      VENA CAVA FILTER PLACEMENT          Medications Prior to Admission: Prior to Admission medications    Medication Sig Start Date End Date Taking? Authorizing Provider   calcium carbonate 600 MG TABS tablet Take 650 mg by mouth Twice a Week   Yes Historical Provider, MD   vitamin D (CHOLECALCIFEROL) 25 MCG (1000 UT) TABS tablet Take 1,000 Units by mouth daily   Yes Historical Provider, MD   nicotine (NICODERM CQ) 7 MG/24HR Place 1 patch onto the skin every 24 hours   Yes Historical Provider, MD   LACTOBACILLUS ACID-PECTIN PO Take 100 mg by mouth 3 times daily (with meals)   Yes Historical Provider, MD   cephALEXin (KEFLEX) 500 MG capsule Take 500 mg by mouth in the morning and at bedtime For 4 days   Yes Historical Provider, MD   dextromethorphan-guaiFENesin (MUCINEX DM)  MG per extended release tablet Take 2 tablets by mouth daily Reports drainage from her sinuses   Yes Historical Provider, MD   promethazine (PHENERGAN) 12.5 MG tablet Take 12.5 mg by mouth every 8 hours as needed for Nausea   Yes Historical Provider, MD   triamcinolone (KENALOG) 0.025 % ointment Apply 1 Applicatorful topically 2 times daily Apply topically 2 times daily. Yes Historical Provider, MD   hydroxychloroquine (PLAQUENIL) 200 MG tablet Take by mouth daily    Historical Provider, MD   aspirin (ASPIR-LOW) 81 MG EC tablet Take 1 tablet by mouth daily. 10/22/14   Angelina Mcguire DPM   levothyroxine (LEVOTHROID) 112 MCG tablet Take 1 tablet by mouth Daily. 10/22/14   Kaelyn Rodriguez, DO   b complex vitamins capsule Take 1 capsule by mouth Twice a Week    Historical Provider, MD        Allergies:     Ertapenem, Codeine, and Nubain [nalbuphine hcl]    Social History:     Tobacco:    reports that she quit smoking about 12 years ago. She has a 18.50 pack-year smoking history. She has never used smokeless tobacco.  Alcohol:      reports current alcohol use. Drug Use:  reports no history of drug use.     Family History:     Family History   Problem Relation Age of Onset    High Blood Pressure Mother Stroke Mother     Substance Abuse Mother     Cancer Father     Heart Disease Father     High Blood Pressure Sister     Substance Abuse Brother     Stroke Maternal Grandmother     Stroke Paternal Grandfather        Review of Systems:     Positive and Negative as described in HPI. CONSTITUTIONAL:  negative for fevers, chills, sweats, fatigue, weight loss  HEENT:  negative for vision, hearing changes, runny nose, throat pain  RESPIRATORY:  negative for shortness of breath, cough, congestion, wheezing. CARDIOVASCULAR:  negative for chest pain, palpitations. GASTROINTESTINAL:  negative for nausea, vomiting, diarrhea, constipation, change in bowel habits, abdominal pain   GENITOURINARY:  negative for difficulty of urination, burning with urination, frequency   INTEGUMENT:  negative for rash, skin lesions, easy bruising   HEMATOLOGIC/LYMPHATIC:  negative for swelling/edema   ALLERGIC/IMMUNOLOGIC:  negative for urticaria , itching  ENDOCRINE:  negative increase in drinking, increase in urination, hot or cold intolerance  MUSCULOSKELETAL:  post op right  hip pain, negative joint pains, muscle aches, swelling of joints  NEUROLOGICAL:  negative for headaches, dizziness, lightheadedness, numbness, pain, tingling extremities      Physical Exam:     /77   Pulse 77   Temp 98.3 °F (36.8 °C) (Oral)   Resp 18   Ht 5' 3\" (1.6 m)   Wt 134 lb 6.4 oz (61 kg)   SpO2 97%   BMI 23.81 kg/m²   Temp (24hrs), Av.4 °F (36.9 °C), Min:98.3 °F (36.8 °C), Max:98.4 °F (36.9 °C)    No results for input(s): POCGLU in the last 72 hours. No intake or output data in the 24 hours ending 22 1246    General Appearance:  alert, well appearing, and in no acute distress  Head:  normocephalic, atraumatic.   Eye: no icterus, redness, pupils equal and reactive, extraocular eye movements intact, conjunctiva clear  Ear: normal external ear, no discharge, hearing intact  Nose:  no drainage noted  Mouth: mucous membranes moist  Neck: supple, no carotid bruits, thyroid not palpable  Lungs: Bilateral equal air entry, clear to ausculation, no wheezing, rales or rhonchi, normal effort  Cardiovascular: normal rate, regular rhythm, no murmur, gallop, rub. Abdomen: Soft, nontender, nondistended, normal bowel sounds, no hepatomegaly or splenomegaly  Neurologic: There are no new focal motor or sensory deficits, normal muscle tone and bulk, no abnormal sensation, normal speech, cranial nerves II through XII grossly intact  Skin: No gross lesions, rashes, bruising or bleeding on exposed skin area  Extremities: right hip pain and restriction ROM,  peripheral pulses palpable, no pedal edema or calf pain with palpation  Psych: normal affect    Investigations:      Laboratory Testing:  No results found for this or any previous visit (from the past 24 hour(s)). Imaging/Diagonstics:  Recent data reviewed    Assessment :      Primary Problem  Fracture of hip, closed, right, sequela    Active Hospital Problems    Diagnosis Date Noted    Fracture of hip, closed, right, sequela [S72.001S] 09/15/2022     Priority: Medium       Plan:     Debility secondary to right hip surgery ORIF for fracture-continue with physical therapy  Sjogren syndrome-continue with Plaquenil  CKD stage III-creatinine monitoring, 1.02 on admission  Acute blood loss anemia noted postop-will monitor hemoglobin. 9.0 today on admission  Hypothyroid-resume Synthroid  UTI-complete course of Keflex  Mild hyponatremia-monitor sodium; 131 on admission  History of PE, APL antibody-IVC filter in situ    DVT prophylaxis-mechanical only      9/17  Patient reports no new complaints. Engaging with physical therapy. Labs and vitals reviewed. No new issues. Continue with current care.             Consultations:   Natty More MD  9/17/2022  12:46 PM    Copy sent to Dr. Eileen Brown, DO    Please note that

## 2022-09-17 NOTE — PROGRESS NOTES
Physical Medicine & Rehabilitation  Progress Note    9/17/2022 12:16 PM     CC: Ambulatory and ADL dysfunction due to Rt hip fracture status post IM nailing    Subjective:   No complaints. Feels well. ROS:  Denies fevers, chills, sweats. No chest pain, palpitations, lightheadedness. Denies coughing, wheezing or shortness of breath. Denies abdominal pain, nausea, diarrhea or constipation. No new areas of joint pain. Denies new areas of numbness or weakness. Denies new anxiety or depression issues. No new skin problems. Rehabilitation:   PT:  Restrictions/Precautions: Weight Bearing, Fall Risk, General Precautions, Up as Tolerated  Implants present? :  (R ERNESTO)  Other position/activity restrictions: RLE FWBAT  Right Lower Extremity Weight Bearing: Weight Bearing As Tolerated (FWBAT)   Transfers  Sit to Stand: Contact guard assistance, Minimal Assistance  Stand to sit: Contact guard assistance  Stand Pivot Transfers: Contact guard assistance  Comment: Pt requires 2-3 attempts or very minimal assist for sit to stand transfers  Ambulation  Surface: level tile  Device: Rolling Walker  Other Apparatus:  (Writer Follows with W/C)  Assistance: Contact guard assistance  Quality of Gait: Flexed posture, decreased STEVE, decreased step length  Gait Deviations: Slow Natalya, Decreased step length, Decreased step height  Distance: 120ft in am  Comments: vc's needed to correct quality of gait with fair follow through  More Ambulation?: No      OT:  ADL  Feeding: Modified independent   Feeding Skilled Clinical Factors: Per pt report. Grooming: Setup  Grooming Skilled Clinical Factors: Pt positioned in wheelchair sink side. Pt able to complete oral hygiene, hair brushing, and face washing tasks. UE Bathing: Stand by assistance  UE Bathing Skilled Clinical Factors: Pt positioned on shower bench. Pt completed UE bathing with increased time to complete.   LE Bathing: Minimal assistance  LE Bathing Skilled Clinical Factors: Pt positioned on shower bench. Pt able to wash BLE and perineal area while shifting side to side to wash buttocks. Pt required min A- CGA for standing to thoroughly wash buttocks. UE Dressing: Stand by assistance  UE Dressing Skilled Clinical Factors: Pt positoned on shower bench. Pt able to don/doff shirt. LE Dressing: Maximum assistance  LE Dressing Skilled Clinical Factors: Pt positioned on shower bench. Pt able to pull pants/under garments down but needed assist for getting them off over B feet. Pt also required assist for threading BLE through pants/under garments. Mod A required for standing while pt pulled pants/briefs up over hips. Assist required for donning/doffing B socks and ARNOL hose. Toileting: Moderate assistance  Toileting Skilled Clinical Factors: Per therapy staff report. Pt required mod A for standing and assist for pulling under garments/pants back up. Pt able to complete toilet hygiene and able to pull pants down. Additional Comments: OT student facilitated pts engagement in self care tasks while positioned on shower bench. Pt currently limited by pain, strength, fatigue, endurance, balance, and activity tolerance impacting IND with self care tasks. Balance  Sitting Balance: Stand by assistance  Standing Balance: Moderate assistance      Functional Mobility  Functional - Mobility Device: Rolling Walker  Activity: To/from bathroom  Assist Level: Moderate assistance  Functional Mobility Comments: Verbal cues provided for hand placement and safety techniques with Good carryover noted. Pt required cues for sequencing task. No LOB noted throughout. Increased time required to complete functional mobility.      Bed mobility  Bridging: Contact guard assistance  Rolling to Left: Minimal assistance  Rolling to Right: Minimal assistance  Supine to Sit: Minimal assistance (Pt self assisted RLE off mat table)  Sit to Supine: Minimal assistance (Salima to elevate RLE onto mat table)  Scooting: Stand by assistance  Bed Mobility Comments: Unable to formally assess as pt is Seated in recliner at beginning and End of session- pt does note difficulty advancing RLE in/out of bed; Writer Educates/demonstrates use of Gait belt as leg ; Allows pt to practice Self Assisted RLE movement with Gait belt while in recliner. Transfers  Sit to stand: Moderate assistance  Stand to sit: Moderate assistance  Transfer Comments: Verbal cues provided for hand placement and safety techniques with Good carryover noted. Toilet Transfers  Toilet - Technique: Ambulating  Equipment Used: Grab bars  Toilet Transfer: Moderate assistance  Toilet Transfers Comments: Per therapy staff report. Shower Transfers  Shower - Transfer From: Josh Grippe - Transfer Type: To and From  Shower - Transfer To: Shower seat with back  Shower - Technique: Ambulating  Shower Transfers: Moderate assistance  Shower Transfers Comments: Verbal cues provided for hand placement and safety techniques with Good carryover noted. ST:        Objective:  /77   Pulse 77   Temp 98.3 °F (36.8 °C) (Oral)   Resp 18   Ht 5' 3\" (1.6 m)   Wt 134 lb 6.4 oz (61 kg)   SpO2 97%   BMI 23.81 kg/m²  I Body mass index is 23.81 kg/m². I   Wt Readings from Last 1 Encounters:   09/15/22 134 lb 6.4 oz (61 kg)      Temp (24hrs), Av.4 °F (36.9 °C), Min:98.3 °F (36.8 °C), Max:98.4 °F (36.9 °C)         GEN: well developed, well nourished, no acute distress  HEENT: Normocephalic atraumatic, EOMI, mucous membranes pink and moist  CV: RRR, no murmurs, rubs or gallops  PULM: CTAB, no rales or rhonchi. Respirations WNL and unlabored  ABD: soft, NT, ND, +BS and equal  NEURO: A&O x3. Sensation intact to light touch. MSK: 4/5 upper and distal right lower extremity, limited proximal right lower extremity secondary to surgery  EXTREMITIES: No calf tenderness to palpation bilaterally.  No edema BLEs  SKIN: warm dry and intact with good turgor incision clean and intact  PSYCH: appropriately interactive. Affect WNL. Medications   Scheduled Meds:   amLODIPine  5 mg Oral Daily    polyethylene glycol  17 g Oral Daily    lactobacillus  1 capsule Oral TID WC    aspirin  81 mg Oral Daily    ascorbic acid  500 mg Oral Once per day on Mon Thu    Vitamin D  1,000 Units Oral Daily    cephALEXin  500 mg Oral 2 times per day    hydroxychloroquine  200 mg Oral Daily    levothyroxine  112 mcg Oral Daily    guaiFENesin  1,200 mg Oral Daily    nicotine  1 patch TransDERmal Daily    calcium elemental  500 mg Oral Once per day on Mon Thu    fluocinonide   Topical BID     Continuous Infusions:  PRN Meds:.oxyCODONE-acetaminophen, acetaminophen, senna, bisacodyl, promethazine     Diagnostics:     CBC:   Recent Labs     09/16/22  0625   WBC 5.6   RBC 2.95*   HGB 9.0*   HCT 27.7*   MCV 93.7   RDW 17.6*        BMP:   Recent Labs     09/16/22  0625   *   K 4.3      CO2 14*   BUN 19   CREATININE 1.02*     BNP: No results for input(s): BNP in the last 72 hours. PT/INR: No results for input(s): PROTIME, INR in the last 72 hours. APTT: No results for input(s): APTT in the last 72 hours. CARDIAC ENZYMES: No results for input(s): CKMB, CKMBINDEX, TROPONINT in the last 72 hours. Invalid input(s): CKTOTAL;3  FASTING LIPID PANEL:  Lab Results   Component Value Date    CHOL 142 10/21/2014    HDL 58 10/21/2014    TRIG 62 10/21/2014     LIVER PROFILE:   Recent Labs     09/16/22  0625   AST 21   ALT 9   BILIDIR 0.1   BILITOT 0.4   ALKPHOS 61        I/O (24Hr): No intake or output data in the 24 hours ending 09/17/22 1216    Glu last 24 hour  No results for input(s): POCGLU in the last 72 hours. No results for input(s): CLARITYU, COLORU, PHUR, SPECGRAV, PROTEINU, RBCUA, BLOODU, BACTERIA, NITRU, WBCUA, LEUKOCYTESUR, YEAST, GLUCOSEU, BILIRUBINUR in the last 72 hours.       Other Diagnoses/plan:     Ambulatory and ADL dysfunction due to fall with right hip fracture status post IM nailing-weightbearing as tolerated-continue rehab efforts   post fall with right hip fracture and IM nailing-continue monitor incision, clean and intact 9/17  Pain -Percocet  UTI-Keflex complete course 9/19  Blood loss anemia hemoglobin 9.0, monitor  Hypertension-not on blood pressure medication,   Hypothyroid Synthroid  CKD stage III creatinine 1.02  Smoker-NicoDerm patch  Hyponatremia-sodium 131  Connective tissue tissue disorder-Sjogren's-Plaquenil  Vitamin D- supplementation calcium  Questionable on high-dose Mucinex early patient takes daily due to drainage from sinuses verified with patient  DVT prophylaxis-history of PE antiphospholipid antibody syndrome -has [place] filter     per internal medicine on 9/11-consider begin DVT prophylaxis if okay with Ortho and hemoglobin stable will clarify with Ortho if okay to begin DVT prophylaxis and what they would like andl length currently on 81 mg aspirin -called Dr. Nima Willis's office yesterday per nursing-notes they have  left and PA may call. No return call. Called today [employer]-referred to their on-call service [phone]-goov message notes Dr. Huseyin Rucker  on call and will return call-given my cell phone number    Discussed with Dr. Benito Willis-okay to begin Lovenox 40 mg daily or Eliquis for 4 weeks if okay with internal medicine and hemoglobin stable will obtain clearance from internal medicine's- discussed  with huseyin Mckeon for Lovenox    Medicine for medical management      Yanique Lemons. Blanka Monique MD       This note is created with the assistance of a speech recognition program.  While intending to generate a document that actually reflects the content of the visit, the document can still have some errors including those of syntax and sound a like substitutions which may escape proof reading.   In such instances, actual meaning can be extrapolated by contextual diversion

## 2022-09-18 PROCEDURE — 6370000000 HC RX 637 (ALT 250 FOR IP): Performed by: INTERNAL MEDICINE

## 2022-09-18 PROCEDURE — 97535 SELF CARE MNGMENT TRAINING: CPT

## 2022-09-18 PROCEDURE — 97110 THERAPEUTIC EXERCISES: CPT

## 2022-09-18 PROCEDURE — 99231 SBSQ HOSP IP/OBS SF/LOW 25: CPT | Performed by: INTERNAL MEDICINE

## 2022-09-18 PROCEDURE — 6360000002 HC RX W HCPCS: Performed by: PHYSICAL MEDICINE & REHABILITATION

## 2022-09-18 PROCEDURE — 1180000000 HC REHAB R&B

## 2022-09-18 PROCEDURE — 97530 THERAPEUTIC ACTIVITIES: CPT

## 2022-09-18 PROCEDURE — 6370000000 HC RX 637 (ALT 250 FOR IP): Performed by: PHYSICAL MEDICINE & REHABILITATION

## 2022-09-18 PROCEDURE — 99232 SBSQ HOSP IP/OBS MODERATE 35: CPT | Performed by: PHYSICAL MEDICINE & REHABILITATION

## 2022-09-18 PROCEDURE — 6370000000 HC RX 637 (ALT 250 FOR IP): Performed by: NURSE PRACTITIONER

## 2022-09-18 PROCEDURE — 97116 GAIT TRAINING THERAPY: CPT

## 2022-09-18 RX ORDER — OXYCODONE HYDROCHLORIDE AND ACETAMINOPHEN 5; 325 MG/1; MG/1
1 TABLET ORAL EVERY 4 HOURS PRN
Status: DISPENSED | OUTPATIENT
Start: 2022-09-18 | End: 2022-09-21

## 2022-09-18 RX ADMIN — NYSTATIN 100000 UNITS: 100000 SUSPENSION ORAL at 15:33

## 2022-09-18 RX ADMIN — NYSTATIN 100000 UNITS: 100000 SUSPENSION ORAL at 20:23

## 2022-09-18 RX ADMIN — Medication 1 CAPSULE: at 17:12

## 2022-09-18 RX ADMIN — POLYETHYLENE GLYCOL 3350 17 G: 17 POWDER, FOR SOLUTION ORAL at 07:23

## 2022-09-18 RX ADMIN — OXYCODONE HYDROCHLORIDE AND ACETAMINOPHEN 1 TABLET: 5; 325 TABLET ORAL at 00:38

## 2022-09-18 RX ADMIN — LEVOTHYROXINE SODIUM 112 MCG: 112 TABLET ORAL at 06:17

## 2022-09-18 RX ADMIN — CEPHALEXIN 500 MG: 500 CAPSULE ORAL at 07:23

## 2022-09-18 RX ADMIN — GUAIFENESIN 1200 MG: 600 TABLET, EXTENDED RELEASE ORAL at 07:24

## 2022-09-18 RX ADMIN — FLUOCINONIDE: 0.5 CREAM TOPICAL at 07:31

## 2022-09-18 RX ADMIN — OXYCODONE HYDROCHLORIDE AND ACETAMINOPHEN 1 TABLET: 5; 325 TABLET ORAL at 13:31

## 2022-09-18 RX ADMIN — Medication 1 CAPSULE: at 07:23

## 2022-09-18 RX ADMIN — AMLODIPINE BESYLATE 5 MG: 5 TABLET ORAL at 07:23

## 2022-09-18 RX ADMIN — OXYCODONE HYDROCHLORIDE AND ACETAMINOPHEN 1 TABLET: 5; 325 TABLET ORAL at 06:26

## 2022-09-18 RX ADMIN — SENNOSIDES 17.2 MG: 8.6 TABLET, FILM COATED ORAL at 08:00

## 2022-09-18 RX ADMIN — ENOXAPARIN SODIUM 40 MG: 100 INJECTION SUBCUTANEOUS at 09:30

## 2022-09-18 RX ADMIN — HYDROXYCHLOROQUINE SULFATE 200 MG: 200 TABLET ORAL at 07:31

## 2022-09-18 RX ADMIN — ASPIRIN 81 MG 81 MG: 81 TABLET ORAL at 07:23

## 2022-09-18 RX ADMIN — Medication 1 CAPSULE: at 12:29

## 2022-09-18 RX ADMIN — Medication 1000 UNITS: at 07:23

## 2022-09-18 RX ADMIN — OXYCODONE HYDROCHLORIDE AND ACETAMINOPHEN 1 TABLET: 5; 325 TABLET ORAL at 21:11

## 2022-09-18 RX ADMIN — CEPHALEXIN 500 MG: 500 CAPSULE ORAL at 20:24

## 2022-09-18 ASSESSMENT — PAIN DESCRIPTION - PAIN TYPE
TYPE: ACUTE PAIN

## 2022-09-18 ASSESSMENT — PAIN DESCRIPTION - FREQUENCY
FREQUENCY: CONTINUOUS
FREQUENCY: CONTINUOUS

## 2022-09-18 ASSESSMENT — PAIN SCALES - GENERAL
PAINLEVEL_OUTOF10: 8
PAINLEVEL_OUTOF10: 7
PAINLEVEL_OUTOF10: 7
PAINLEVEL_OUTOF10: 4
PAINLEVEL_OUTOF10: 8
PAINLEVEL_OUTOF10: 7
PAINLEVEL_OUTOF10: 8

## 2022-09-18 ASSESSMENT — PAIN - FUNCTIONAL ASSESSMENT
PAIN_FUNCTIONAL_ASSESSMENT: PREVENTS OR INTERFERES SOME ACTIVE ACTIVITIES AND ADLS
PAIN_FUNCTIONAL_ASSESSMENT: PREVENTS OR INTERFERES SOME ACTIVE ACTIVITIES AND ADLS

## 2022-09-18 ASSESSMENT — PAIN DESCRIPTION - LOCATION
LOCATION: HIP
LOCATION: HIP;LEG
LOCATION: HIP;LEG
LOCATION: HIP
LOCATION: HIP;LEG
LOCATION: HIP;LEG

## 2022-09-18 ASSESSMENT — PAIN DESCRIPTION - ORIENTATION
ORIENTATION: RIGHT

## 2022-09-18 ASSESSMENT — PAIN DESCRIPTION - DESCRIPTORS
DESCRIPTORS: ACHING
DESCRIPTORS: ACHING

## 2022-09-18 ASSESSMENT — PAIN DESCRIPTION - ONSET
ONSET: ON-GOING
ONSET: ON-GOING

## 2022-09-18 NOTE — PROGRESS NOTES
Physical Therapy  Facility/Department: [facility] ACUTE REHAB  Rehabilitation Physical Therapy  NAME: Braydon Olson  : 1945 (78 y.o.)  MRN: [id number]  CODE STATUS: Full Code    Date of Service: 22      Past Medical History:   Diagnosis Date    CKD (chronic kidney disease) stage 3, GFR 30-59 ml/min     Dehydration     Dysphagia     GERD (gastroesophageal reflux disease)     [place] filter in place     History of DVT (deep vein thrombosis)     Hx: UTI (urinary tract infection)     Hypertension     Hyperthyroidism     XRT    Hyperthyroidism     Hypothyroidism     due to Rx for Graves disease    Iron deficiency anemia     Lupus (HCC)     Metabolic acidosis     Pharyngitis     Pulmonary embolism (HCC)     Pyelonephritis     Pyelonephritis     Renal abscess     Right kidney mass     Seizures ([address]     SLE ([facility] encephalitis)      Past Surgical History:   Procedure Laterality Date    APPENDECTOMY      BUNIONECTOMY      BUNIONECTOMY      COLONOSCOPY      HYSTERECTOMY      REVISION COLOSTOMY      VENA CAVA FILTER PLACEMENT         Chart Reviewed: Yes  Patient assessed for rehabilitation services?: Yes  Additional Pertinent Hx: The patient is a 78 y.o. female, S/P mechanical fall at home with subsequent sustained R femur fracture, S/P Intramedullary trochanteric fixation nail to R Hip Performed by Dr. Brittany Jackson on 22  Family / Caregiver Present: No  Referring Practitioner: Dr. Iliana Kevin  Referral Date : 09/15/22  Diagnosis: Closed R Hip Fracture; S/P R IM nailing 22    Restrictions:  Restrictions/Precautions: Weight Bearing; Fall Risk;General Precautions; Up as Tolerated  Lower Extremity Weight Bearing Restrictions  Right Lower Extremity Weight Bearing: Weight Bearing As Tolerated (FWBAT)  Position Activity Restriction  Other position/activity restrictions: RLE FWBAT     SUBJECTIVE  Subjective: pt agreed to PT, pt up in W/C upon arrival. Reported tiredd in pm            OBJECTIVE Functional Mobility  Bed mobility  Bridging: Contact guard assistance  Rolling to Left: Minimal assistance  Rolling to Right: Minimal assistance  Supine to Sit: Minimal assistance  Sit to Supine: Minimal assistance  Scooting: Minimal assistance  Transfers  Sit to Stand: Contact guard assistance;Minimal Assistance  Stand to sit: Contact guard assistance  Stand Pivot Transfers: Contact guard assistance  Comment: Intermittently demo's posterior lean upon standing    Environmental Mobility  Ambulation  Surface: level tile  Device: Rolling Walker  Other Apparatus:  (Writer Follows with W/C)  Assistance: Contact guard assistance  Quality of Gait: Flexed posture, decreased STEVE, decreased step length  Gait Deviations: Slow Natalya;Decreased step length;Decreased step height  Distance: 120ft in am 130 feet pm  Comments: fatigue with gait distance  More Ambulation?: Yes  Stairs/Curb  Stairs?: Yes  Stairs  # Steps : 4  Rails: Bilateral  Assistance: Contact guard assistance  Comment: With Cues/Edu/Demo pt demos good carry over sequence and stepping pattern to maximize safety    PT Exercises  PROM Exercises: BLE gastroc stretches 30\"x 3  A/AROM Exercises: Supine RLE A/AAROM 10 x 2;,Occassional assistance needed;  Standing BLE AROM in // bars x 10 including heel raises, marches, HS curls. Limited ROM noted on RLE  Circulation/Endurance Exercises: Seated marching, kicks, ankle pumps 20 reps  Static Sitting Balance Exercises: Standing balloon tap x 1 min, no LOB; Static standing with single UE support reaching STEVE and crossing midline x 1min 35sec, no LOB; Standing balance in restroom w/o UE support CGA to aide/doff pants for toileting, no LOB  Standing Open/Closed Kinetic Chain Exercises: //bar support BUE 20 reps  Exercise Equipment: NuStep 15 minutes am and pm L4    ASSESSMENT       Activity Tolerance  Activity Tolerance: Patient limited by endurance; Patient limited by pain  Activity Tolerance Comments: Pt needs frequent rest breaks d/t fatigue and SOB         GOALS  Patient Goals   Patient goals : To walk better  Short Term Goals  Time Frame for Short term goals: 5 days  Short term goal 1: Pt to demo All bed mobility with CGA using Gait belt as leglift assist as needed  Short term goal 2: pt to demo all transfers with RW and SBA  Short term goal 3: pt to ambulate 150' with RW and SBA  Short term goal 4: pt to negotiate 5 steps with rails and AD as needed to allow for safe home access. Short term goal 5: pt to identify effective methods for reducing pain and swelling (including proper LE Elevation techinque/Icing Technique)  Additional Goals?: Yes  Short Term Goal 6: Pt to demonstrate good technique for HEP exercises to improve pt's independence with self-care exercise routine  Long Term Goals  Time Frame for Long term goals : Until D/C  Long term goal 1: Pt to demo All bed mobility independently using Gait belt as leglift assist as needed  Long term goal 2: pt to demo all transfers with RW Mod I  Long term goal 3: pt to ambulate 150' with RW Mod I on level surfaces, 10-30' ramped surface RW Supervision, and Outdoor terrain with RW and SBA  Long term goal 4: pt to negotiate 5 steps with rails, SBA, and AD as needed to allow for safe home access. Long term goal 5: pt to demo simulated or actual car transfer with safe technique, appropriate device, and SBA  Additional Goals?: Yes  Long term goal 6: Pt to Improve Distance on 2MWT By 113' to demo Minimal Detectable Change in walking speed. Goal is to walk 198-200' in 2 Minutes.     PLAN OF CARE  Frequency: 1-2 treatment sessions per day, 5-7 days per week  Safety Devices  Type of Devices: Gait belt;Call light within reach;Nurse notified    EDUCATION  Education  Education Given To: Patient  Education Provided: Transfer Training;Mobility Training;Home Exercise Program  Education Provided Comments: transfers and therex  Education Method: Verbal  Barriers to Learning: None  Education Outcome: Verbalized understanding             Therapy Time   Individual Concurrent Group Co-treatment   Time In 1407         Time Out 1435         Minutes Alysia Noland PTA, 09/18/22 at 3:46 PM

## 2022-09-18 NOTE — PLAN OF CARE
Problem: Discharge Planning  Goal: Discharge to home or other facility with appropriate resources  Outcome: Progressing  Flowsheets (Taken 9/18/2022 1333)  Discharge to home or other facility with appropriate resources: Identify barriers to discharge with patient and caregiver     Problem: Safety - Adult  Goal: Free from fall injury  Outcome: Progressing  Flowsheets (Taken 9/18/2022 1612)  Free From Fall Injury: Instruct family/caregiver on patient safety     Problem: ABCDS Injury Assessment  Goal: Absence of physical injury  Outcome: Progressing  Flowsheets (Taken 9/18/2022 1612)  Absence of Physical Injury: Implement safety measures based on patient assessment     Problem: Nutrition Deficit:  Goal: Optimize nutritional status  Outcome: Progressing     Problem: Pain  Goal: Verbalizes/displays adequate comfort level or baseline comfort level  Outcome: Progressing     Problem: Skin/Tissue Integrity  Goal: Absence of new skin breakdown  Description: 1. Monitor for areas of redness and/or skin breakdown  2. Assess vascular access sites hourly  3. Every 4-6 hours minimum:  Change oxygen saturation probe site  4. Every 4-6 hours:  If on nasal continuous positive airway pressure, respiratory therapy assess nares and determine need for appliance change or resting period.   Outcome: Progressing

## 2022-09-18 NOTE — PLAN OF CARE
Problem: Discharge Planning  Goal: Discharge to home or other facility with appropriate resources  9/18/2022 0224 by Kaitlyn Li RN  Outcome: Progressing     Problem: Safety - Adult  Goal: Free from fall injury  9/18/2022 0224 by Kaitlyn Li RN  Outcome: Progressing     Problem: ABCDS Injury Assessment  Goal: Absence of physical injury  9/18/2022 0224 by Kaitlyn Li RN  Outcome: Progressing     Problem: Nutrition Deficit:  Goal: Optimize nutritional status  9/18/2022 0224 by Kaitlyn Li RN  Outcome: Progressing     Problem: Pain  Goal: Verbalizes/displays adequate comfort level or baseline comfort level  9/18/2022 0224 by Kaitlyn Li RN  Outcome: Progressing     Problem: Skin/Tissue Integrity  Goal: Absence of new skin breakdown  Description: 1. Monitor for areas of redness and/or skin breakdown  2. Assess vascular access sites hourly  3. Every 4-6 hours minimum:  Change oxygen saturation probe site  4. Every 4-6 hours:  If on nasal continuous positive airway pressure, respiratory therapy assess nares and determine need for appliance change or resting period.   9/18/2022 0224 by Kaitlyn Li RN  Outcome: Progressing

## 2022-09-18 NOTE — PROGRESS NOTES
[address]   Acute Rehabilitation Occupational Therapy Daily Treatment Note    Date: 22  Patient Name: Gus Garcia       Room: 4249/2675-87  MRN: [id number]  Account: [de-identified]   : 1945  (78 y.o.) Gender: female       Referring Practitioner: Nicolette Chan MD  Diagnosis: Fracture of hip, closed, right, sequela       Treatment Diagnosis: Impaired self care status    Past Medical History:  has a past medical history of CKD (chronic kidney disease) stage 3, GFR 30-59 ml/min, Dehydration, Dysphagia, GERD (gastroesophageal reflux disease), [place] filter in place, History of DVT (deep vein thrombosis), Hx: UTI (urinary tract infection), Hypertension, Hyperthyroidism, Hyperthyroidism, Hypothyroidism, Iron deficiency anemia, Lupus ([address] Metabolic acidosis, Pharyngitis, Pulmonary embolism ([address] Pyelonephritis, Pyelonephritis, Renal abscess, Right kidney mass, Seizures ([address] and SLE ([facility] encephalitis). Past Surgical History:   has a past surgical history that includes Vena Cava Filter Placement; Hysterectomy; Bunionectomy; Appendectomy; Colonoscopy; Bunionectomy; and Revision Colostomy.     Restrictions  Restrictions/Precautions  Restrictions/Precautions: Weight Bearing, Fall Risk, General Precautions, Up as Tolerated  Required Braces or Orthoses?: No  Implants present? :  (R ERNESTO)     Position Activity Restriction  Other position/activity restrictions: RLE FWBAT  Lower Extremity Weight Bearing Restrictions  Right Lower Extremity Weight Bearing: Weight Bearing As Tolerated (FWBAT)       Vitals  Vital Signs  BP: (!) 145/81 (seated post standing activity)  MAP (Calculated): 102.33  O2 Device: None (Room air)  Comment: BP while standing 118/63     Subjective  Subjective  Subjective: Pleasant and agreeable to OT tx  Pain Assessment  Pain Assessment: 0-10  Pain Level: 8  Pain Location: Hip;Leg  Pain Orientation: Right  Pain Type: Acute pain    Objective  Cognition  Overall Orientation Status: Within Functional Limits  Orientation Level: Oriented X4    Cognition  Overall Cognitive Status: WFL    Activities of Daily Living  Feeding  Assistance Level: Independent  Skilled Clinical Factors: per pt report    Grooming/Oral Hygiene  Assistance Level: Set-up  Skilled Clinical Factors: seated in w/c    Upper Extremity Bathing  Assistance Level: Set-up  Skilled Clinical Factors: seated TTB    Lower Extremity Bathing  Equipment Provided: Long-handled sponge  Assistance Level: Contact guard assist  Skilled Clinical Factors: seated weight shift for suzi care, seated for washing BLEs, CGA in stance to dry buttocks/suzi area with use of shower GB for 0-1 UE support no LOB    Upper Extremity Dressing  Assistance Level: Set-up  Skilled Clinical Factors: donning/doffing [place] shirt and bra    Lower Extremity Dressing  Equipment Provided: Reachers  Skilled Clinical Factors: doffing pants/under garments with reacher    Putting On/Taking Off Footwear  Equipment Provided: Reachers;Sock aid  Assistance Level: Contact guard assist  Skilled Clinical Factors: TA for TEDs, doffing footies with reacher, vc for proper sequence/use of sock aid while donning footies    Toileting  Assistance Level: Contact guard assist  Skilled Clinical Factors: CGA for clothing mgt and suzi care using RW for UE support, no LOB    Toilet Transfers  Technique:  (ambulating with RW)  Equipment: Grab bars;Standard toilet  Additional Factors: Verbal cues  Assistance Level: Minimal assistance  Skilled Clinical Factors: vc for hand placemenrt no LOB observed    Tub/Shower Transfers  Type: Shower  Transfer From: Rolling walker  Transfer To: Tub transfer bench  Additional Factors: Verbal cues;Cues for hand placement; With handrails  Assistance Level: Minimal assistance  Skilled Clinical Factors: vc for proper/safe transfer sequence over threshold, no LOB           Mobility        Supine to Sit  Assistance Level: Stand by assist  Skilled Clinical Factors: HOB elevated and bed rails used  Scooting  Assistance Level: Stand by assist  Skilled Clinical Factors: increased time req to bring hips to EOB       Sit to Stand  Assistance Level: Minimal assistance  Skilled Clinical Factors: flucuates CGA- MIN A pending on surfaces/fatigue/pain level; vc for hand placement for increased safety,  Stand to Sit  Assistance Level: Contact guard assist  Skilled Clinical Factors: vc for hand placement for controlled sit        Functional Mobility  Device: Rolling walker  Activity: To/From bathroom  Assistance Level: Contact guard assist  Skilled Clinical Factors: slow and shuffled gait vc for increased stride height and length, no LOB      OT Exercises  Static Standing Balance Exercises: PM: Pt engagement in standing table top task to address ability to self correct should balance be lost, tolerance and endurance needed to engage in functional tasks safely and indep. During 1st standing trial pt c/o of slight dizziness req seated RB. BP montiored with reading of 145/81 and pt reporting dizziness subsiding after sitting down. Pt completing standing trials tolerating 1-2 mins, 2-3 mins, 3-4 mins using table top for UE support no LOB observed. While in stance BP monitored again with reading of 118/63. Assessment  Assessment  Activity Tolerance: Patient limited by endurance; Patient limited by pain  Discharge Recommendations: Home with assist PRN;Patient would benefit from continued therapy after discharge    Patient Education  Education  Education Given To: Patient  Education Provided: Role of Therapy;Plan of Care;Precautions; Safety;ADL Function;Mobility Training;Transfer Training  Education Method: Verbal;Demonstration  Barriers to Learning: None  Education Outcome: Verbalized understanding       Goals  Patient Goals   Patient goals :  \"To get as much accomplished as I can and to come out stronger to keep up with my jobs, my grandchildren, and the little ones\"  Short Term Goals  Time Frame for Short term goals: One week  Short Term Goal 1: Pt will complete LB dressing/bathing/toileting tasks with CGA and Good safety to improve IND and safety with self care tasks. Short Term Goal 2: Pt will complete UB dressing/bathing tasks with supervision and Good safety to improve IND and safety with self care tasks. Short Term Goal 3: Pt will complete functional transfers/mobility with CGA and Good safety to improve IND and safety with self care and mobility tasks. Short Term Goal 4: Pt will tolerate standing for 6+ minutes during functional activity of choice with CGA and Good safety to improve IND and safety with self care and mobility tasks. Short Term Goal 5: Pt will verbalize/demonstrate Good understanding of at least 3 AD/DME/modified techniques to improve IND and safety with self care and mobility tasks. Additional Goals?: Yes  Short Term Goal 6: OTR/EM to further assess FMC/ strength. Short Term Goal 7: Pt will participate in 30+ minutes of therapeutic exercises/functional activities to improve IND and safety with self care and mobility tasks. Long Term Goals  Time Frame for Long term goals : By discharge  Long Term Goal 1: Pt will complete BADLs with modified IND and Good safety  to improve IND and safety with self care tasks. Long Term Goal 2: Pt will complete functional transfers/mobility with modified IND and Good safety to improve IND and safety with self care and mobility tasks. Long Term Goal 3: Pt will tolerate standing for 12+ minutes during functional activity of choice with modified IND and Good safety to improve IND and safety with self care and mobility tasks. Long Term Goal 4: Pt will verbalize/demonstrate Good understanding of at least 3 fall prevention/home safety strategies to improve IND and safety with self care and mobility tasks.   Long Term Goal 5: Pt will complete medication management task with 100% accuracy with use of adaptive strategies to improve safety. Additional Goals?: Yes  Long Term Goal 6: Pt will complete light housekeeping/meal preparation task with supervision and Good safety to improve IND and safety with ADL/IADL tasks.     Plan  Plan  Times per Week: 5-7  Times per Day: Twice a day  Current Treatment Recommendations: Strengthening, ROM, Balance training, Functional mobility training, Endurance training, Pain management, Safety education & training, Patient/Caregiver education & training, Equipment evaluation, education, & procurement, Self-Care / ADL, Return to work related activity, Coordination training         09/18/22 0800 09/18/22 1335   OT Individual Minutes   Time In 0800 1335   Time Out 1363 [id number]   [id number]                 Electronically signed by ALEX Deutsch on 9/18/22 at 3:12 PM EDT

## 2022-09-18 NOTE — PROGRESS NOTES
Physical Medicine & Rehabilitation  Progress Note    9/18/2022 10:20 AM     CC: Ambulatory and ADL dysfunction due to Rt hip fracture status post IM nailing    Subjective:   Dizziness-questionable when standing up. No BM. Discussed starting of Lovenox-patient agreeable. ROS:  Denies fevers, chills, sweats. No chest pain, palpitations, lightheadedness. Denies coughing, wheezing or shortness of breath. Denies abdominal pain, nausea, diarrhea or constipation. No new areas of joint pain. Denies new areas of numbness or weakness. Denies new anxiety or depression issues. No new skin problems. Rehabilitation:   PT:  Restrictions/Precautions: Weight Bearing, Fall Risk, General Precautions, Up as Tolerated  Implants present? :  (R ERNESTO)  Other position/activity restrictions: RLE FWBAT  Right Lower Extremity Weight Bearing: Weight Bearing As Tolerated (FWBAT)   Transfers  Sit to Stand: Contact guard assistance, Minimal Assistance  Stand to sit: Contact guard assistance  Stand Pivot Transfers: Contact guard assistance  Comment: Pt requires 2-3 attempts most stands or very minimal assist for sit to stand transfers; Intermittently demo's posterior lean upon standing  Ambulation  Surface: level tile  Device: Rolling Walker  Other Apparatus:  (Writer Follows with W/C)  Assistance: Contact guard assistance  Quality of Gait: Flexed posture, decreased STEVE, decreased step length  Gait Deviations: Slow Natalya, Decreased step length, Decreased step height  Distance: 120ft in am  Comments: vc's needed to correct quality of gait with fair follow through  More Ambulation?: Yes      OT:  ADL  Feeding: Modified independent   Feeding Skilled Clinical Factors: Per pt report. Grooming: Setup  Grooming Skilled Clinical Factors: Pt positioned in wheelchair sink side. Pt able to complete oral hygiene, hair brushing, and face washing tasks.   UE Bathing: Stand by assistance  UE Bathing Skilled Clinical Factors: Pt positioned on shower bench. Pt completed UE bathing with increased time to complete. LE Bathing: Minimal assistance  LE Bathing Skilled Clinical Factors: Pt positioned on shower bench. Pt able to wash BLE and perineal area while shifting side to side to wash buttocks. Pt required min A- CGA for standing to thoroughly wash buttocks. UE Dressing: Stand by assistance  UE Dressing Skilled Clinical Factors: Pt positoned on shower bench. Pt able to don/doff shirt. LE Dressing: Maximum assistance  LE Dressing Skilled Clinical Factors: Pt positioned on shower bench. Pt able to pull pants/under garments down but needed assist for getting them off over B feet. Pt also required assist for threading BLE through pants/under garments. Mod A required for standing while pt pulled pants/briefs up over hips. Assist required for donning/doffing B socks and ARNOL hose. Toileting: Moderate assistance  Toileting Skilled Clinical Factors: Per therapy staff report. Pt required mod A for standing and assist for pulling under garments/pants back up. Pt able to complete toilet hygiene and able to pull pants down. Additional Comments: OT student facilitated pts engagement in self care tasks while positioned on shower bench. Pt currently limited by pain, strength, fatigue, endurance, balance, and activity tolerance impacting IND with self care tasks. Balance  Sitting Balance: Stand by assistance  Standing Balance: Moderate assistance      Functional Mobility  Functional - Mobility Device: Rolling Walker  Activity: To/from bathroom  Assist Level: Moderate assistance  Functional Mobility Comments: Verbal cues provided for hand placement and safety techniques with Good carryover noted. Pt required cues for sequencing task. No LOB noted throughout. Increased time required to complete functional mobility.      Bed mobility  Bridging: Contact guard assistance  Rolling to Left: Minimal assistance  Rolling to Right: Minimal assistance  Supine to Sit: surgery  EXTREMITIES: No calf tenderness to palpation bilaterally. No edema BLEs  SKIN: warm dry and intact with good turgor incision clean and intact 9/17  PSYCH: appropriately interactive. Affect WNL. Medications   Scheduled Meds:   enoxaparin  40 mg SubCUTAneous Daily    amLODIPine  5 mg Oral Daily    polyethylene glycol  17 g Oral Daily    lactobacillus  1 capsule Oral TID WC    aspirin  81 mg Oral Daily    ascorbic acid  500 mg Oral Once per day on Mon Thu    Vitamin D  1,000 Units Oral Daily    cephALEXin  500 mg Oral 2 times per day    hydroxychloroquine  200 mg Oral Daily    levothyroxine  112 mcg Oral Daily    guaiFENesin  1,200 mg Oral Daily    nicotine  1 patch TransDERmal Daily    calcium elemental  500 mg Oral Once per day on Mon Thu    fluocinonide   Topical BID     Continuous Infusions:  PRN Meds:.oxyCODONE-acetaminophen, acetaminophen, senna, bisacodyl, promethazine     Diagnostics:     CBC:   Recent Labs     09/16/22  0625   WBC 5.6   RBC 2.95*   HGB 9.0*   HCT 27.7*   MCV 93.7   RDW 17.6*          BMP:   Recent Labs     09/16/22  0625   *   K 4.3      CO2 14*   BUN 19   CREATININE 1.02*       BNP: No results for input(s): BNP in the last 72 hours. PT/INR: No results for input(s): PROTIME, INR in the last 72 hours. APTT: No results for input(s): APTT in the last 72 hours. CARDIAC ENZYMES: No results for input(s): CKMB, CKMBINDEX, TROPONINT in the last 72 hours. Invalid input(s): CKTOTAL;3  FASTING LIPID PANEL:  Lab Results   Component Value Date    CHOL 142 10/21/2014    HDL 58 10/21/2014    TRIG 62 10/21/2014     LIVER PROFILE:   Recent Labs     09/16/22  0625   AST 21   ALT 9   BILIDIR 0.1   BILITOT 0.4   ALKPHOS 61          I/O (24Hr): No intake or output data in the 24 hours ending 09/18/22 1020    Glu last 24 hour  No results for input(s): POCGLU in the last 72 hours.     No results for input(s): CLARITYU, COLORU, CHINYERE, Christopher 27, [address] [facility], [address] [place] [address] Marcela 89., BACTERIA, NITRU, WBCUA, LEUKOCYTESUR, YEAST, Jill  the last 72 hours. Other Diagnoses/plan:     Ambulatory and ADL dysfunction due to fall with right hip fracture status post IM nailing-weightbearing as tolerated-continue rehab efforts   post fall with right hip fracture and IM nailing-continue monitor incision, clean and intact 9/17  Pain -Percocet  UTI-Keflex complete course 9/19  Blood loss anemia hemoglobin 9.0, monitor  Hypertension-not on blood pressure medication, check orthostatic due to questionable dizziness when standing  Hypothyroid Synthroid  CKD  -creatinine 1.02  Smoker-NicoDerm patch  Hyponatremia-sodium 131  Connective tissue tissue disorder-Sjogren's-Plaquenil  Vitamin D- supplementation calcium  -On senna, MiraLAX and as needed suppository-no BM-senna tonight and suppository discussed with nursing  Questionable on high-dose Mucinex - patient takes daily due to drainage from sinuses verified with patient  DVT prophylaxis-history of PE antiphospholipid antibody syndrome -has Abelardo filter begin Lovenox-cleared by Ortho, internal medicine and discussed with patient, complete 4-week course per Dr. Nanette Momin for medical management      Lisa Zepeda. Bhaskar aZmbrano MD       This note is created with the assistance of a speech recognition program.  While intending to generate a document that actually reflects the content of the visit, the document can still have some errors including those of syntax and sound a like substitutions which may escape proof reading.   In such instances, actual meaning can be extrapolated by contextual diversion

## 2022-09-18 NOTE — PROGRESS NOTES
Pt slept all night pain medication given and effective.  Pt used the sera steady thru out the night to go to the bathroom

## 2022-09-19 PROCEDURE — 6370000000 HC RX 637 (ALT 250 FOR IP): Performed by: PHYSICAL MEDICINE & REHABILITATION

## 2022-09-19 PROCEDURE — 1180000000 HC REHAB R&B

## 2022-09-19 PROCEDURE — 97535 SELF CARE MNGMENT TRAINING: CPT

## 2022-09-19 PROCEDURE — 99231 SBSQ HOSP IP/OBS SF/LOW 25: CPT | Performed by: INTERNAL MEDICINE

## 2022-09-19 PROCEDURE — 6360000002 HC RX W HCPCS: Performed by: PHYSICAL MEDICINE & REHABILITATION

## 2022-09-19 PROCEDURE — 99232 SBSQ HOSP IP/OBS MODERATE 35: CPT | Performed by: PHYSICAL MEDICINE & REHABILITATION

## 2022-09-19 PROCEDURE — 97110 THERAPEUTIC EXERCISES: CPT

## 2022-09-19 PROCEDURE — 97530 THERAPEUTIC ACTIVITIES: CPT

## 2022-09-19 PROCEDURE — 6370000000 HC RX 637 (ALT 250 FOR IP): Performed by: INTERNAL MEDICINE

## 2022-09-19 PROCEDURE — 97116 GAIT TRAINING THERAPY: CPT

## 2022-09-19 RX ADMIN — POLYETHYLENE GLYCOL 3350 17 G: 17 POWDER, FOR SOLUTION ORAL at 08:08

## 2022-09-19 RX ADMIN — ASPIRIN 81 MG 81 MG: 81 TABLET ORAL at 08:08

## 2022-09-19 RX ADMIN — OXYCODONE HYDROCHLORIDE AND ACETAMINOPHEN 1 TABLET: 5; 325 TABLET ORAL at 11:47

## 2022-09-19 RX ADMIN — CEPHALEXIN 500 MG: 500 CAPSULE ORAL at 08:08

## 2022-09-19 RX ADMIN — OXYCODONE HYDROCHLORIDE AND ACETAMINOPHEN 500 MG: 500 TABLET ORAL at 08:08

## 2022-09-19 RX ADMIN — ENOXAPARIN SODIUM 40 MG: 100 INJECTION SUBCUTANEOUS at 08:08

## 2022-09-19 RX ADMIN — OXYCODONE HYDROCHLORIDE AND ACETAMINOPHEN 1 TABLET: 5; 325 TABLET ORAL at 20:30

## 2022-09-19 RX ADMIN — CALCIUM 500 MG: 500 TABLET ORAL at 08:08

## 2022-09-19 RX ADMIN — OXYCODONE HYDROCHLORIDE AND ACETAMINOPHEN 1 TABLET: 5; 325 TABLET ORAL at 02:45

## 2022-09-19 RX ADMIN — Medication 1000 UNITS: at 08:08

## 2022-09-19 RX ADMIN — HYDROXYCHLOROQUINE SULFATE 200 MG: 200 TABLET ORAL at 08:10

## 2022-09-19 RX ADMIN — Medication 1 CAPSULE: at 08:08

## 2022-09-19 RX ADMIN — LEVOTHYROXINE SODIUM 112 MCG: 112 TABLET ORAL at 06:03

## 2022-09-19 RX ADMIN — Medication 1 CAPSULE: at 11:47

## 2022-09-19 RX ADMIN — GUAIFENESIN 1200 MG: 600 TABLET, EXTENDED RELEASE ORAL at 08:08

## 2022-09-19 RX ADMIN — FLUOCINONIDE: 0.5 CREAM TOPICAL at 08:13

## 2022-09-19 RX ADMIN — OXYCODONE HYDROCHLORIDE AND ACETAMINOPHEN 1 TABLET: 5; 325 TABLET ORAL at 16:15

## 2022-09-19 RX ADMIN — Medication 1 CAPSULE: at 16:15

## 2022-09-19 RX ADMIN — AMLODIPINE BESYLATE 5 MG: 5 TABLET ORAL at 08:08

## 2022-09-19 RX ADMIN — OXYCODONE HYDROCHLORIDE AND ACETAMINOPHEN 1 TABLET: 5; 325 TABLET ORAL at 08:08

## 2022-09-19 ASSESSMENT — PAIN DESCRIPTION - LOCATION
LOCATION: LEG;HIP
LOCATION: HIP
LOCATION: HIP

## 2022-09-19 ASSESSMENT — PAIN DESCRIPTION - DESCRIPTORS
DESCRIPTORS: STABBING
DESCRIPTORS: ACHING

## 2022-09-19 ASSESSMENT — PAIN SCALES - GENERAL
PAINLEVEL_OUTOF10: 8
PAINLEVEL_OUTOF10: 7

## 2022-09-19 ASSESSMENT — PAIN DESCRIPTION - ORIENTATION
ORIENTATION: RIGHT

## 2022-09-19 NOTE — PLAN OF CARE
Problem: Discharge Planning  Goal: Discharge to home or other facility with appropriate resources  9/19/2022 0931 by Kendrick Palma RN  Outcome: Progressing  9/19/2022 0428 by Roseann Still RN  Outcome: Progressing     Problem: Safety - Adult  Goal: Free from fall injury  9/19/2022 0931 by Kendrick Palma RN  Outcome: Progressing  9/19/2022 0428 by Roseann tSill RN  Outcome: Progressing     Problem: ABCDS Injury Assessment  Goal: Absence of physical injury  9/19/2022 0931 by Kendrick Palma RN  Outcome: Progressing  9/19/2022 0428 by Roseann Still RN  Outcome: Progressing     Problem: Nutrition Deficit:  Goal: Optimize nutritional status  9/19/2022 0931 by Kendrick Palma RN  Outcome: Progressing  9/19/2022 0428 by Roseann Still RN  Outcome: Progressing     Problem: Pain  Goal: Verbalizes/displays adequate comfort level or baseline comfort level  9/19/2022 0931 by Kendrick Palma RN  Outcome: Progressing  9/19/2022 0428 by Roseann Still RN  Outcome: Progressing     Problem: Skin/Tissue Integrity  Goal: Absence of new skin breakdown  Description: 1. Monitor for areas of redness and/or skin breakdown  2. Assess vascular access sites hourly  3. Every 4-6 hours minimum:  Change oxygen saturation probe site  4. Every 4-6 hours:  If on nasal continuous positive airway pressure, respiratory therapy assess nares and determine need for appliance change or resting period.   9/19/2022 0931 by Kendrick Palma RN  Outcome: Progressing  9/19/2022 0428 by Roseann Still RN  Outcome: Progressing

## 2022-09-19 NOTE — PROGRESS NOTES
Physical Medicine & Rehabilitation  Progress Note      Subjective:      78year-old female with R hip fracture treated with IM nail. Patient is well, and has had no acute complaints or problems. She is having some serosanguinous drainage from the top of the incision. Pain is responding to prn medications. ROS:  Denies fevers, chills, sweats. No chest pain, palpitations, lightheadedness. Denies coughing, wheezing or shortness of breath. Denies abdominal pain, nausea, diarrhea or constipation. No new areas of joint pain. Denies new areas of numbness or weakness. Denies new anxiety or depression issues. No new skin problems. Rehabilitation:   Progressing in therapies. PT:    Bed mobility  Bridging: Contact guard assistance  Rolling to Left: Minimal assistance  Rolling to Right: Minimal assistance  Supine to Sit: Minimal assistance  Sit to Supine: Minimal assistance  Scooting: Minimal assistance  Bed Mobility Comments: Unable to formally assess as pt is Seated in recliner at beginning and End of session- pt does note difficulty advancing RLE in/out of bed; Writer Educates/demonstrates use of Gait belt as leg ; Allows pt to practice Self Assisted RLE movement with Gait belt while in recliner.          Transfers  Sit to Stand: Contact guard assistance, Minimal Assistance  Stand to sit: Contact guard assistance  Stand Pivot Transfers: Contact guard assistance  Comment: Intermittently demo's posterior lean upon standing         Ambulation  Surface: level tile  Device: Rolling Walker  Other Apparatus:  (Writer Follows with W/C)  Assistance: Contact guard assistance  Quality of Gait: Flexed posture, decreased STEVE, decreased step length  Gait Deviations: Slow Natalya, Decreased step length, Decreased step height  Distance: 120ft in am 130 feet pm  Comments: fatigue with gait distance  More Ambulation?: Yes  Ambulation 2  Surface - 2: level tile  Device 2: Rolling Walker  Assistance 2: Contact guard assistance  Quality of Gait 2: Antalgic, Slow but steady. Moderate BUE support on RW. Dec Heel contact/toe push-off bilaterally  Gait Deviations: Slow Natalya, Decreased step length, Decreased step height  Distance: 15ft x 2 in room, 70ft  Comments: Distance limited this session by sudden onset of dizziness. pt returned to sitting and reports dizziness subsided, RN notified       OT:  Grooming/Oral Hygiene  Assistance Level: Set-up  Skilled Clinical Factors: seated in w/c  Upper Extremity Bathing  Assistance Level: Set-up  Skilled Clinical Factors: seated in w/c  Lower Extremity Bathing  Equipment Provided: Long-handled sponge  Assistance Level: Contact guard assist  Skilled Clinical Factors: Pt did not complete this date. Upper Extremity Dressing  Assistance Level: Set-up  Skilled Clinical Factors: donning/doffing [place] shirt while seated. Lower Extremity Dressing  Equipment Provided: Reachers  Assistance Level: Contact guard assist  Skilled Clinical Factors: threading pants with reacher, SBA-CGA in standing to pull over hips. Putting On/Taking Off Footwear  Equipment Provided: Reachers, Sock aid  Assistance Level: Minimal assistance  Skilled Clinical Factors: TA for TEDs, doffing footies with reacher, use of sock aid to donning footies  Toileting  Assistance Level: Contact guard assist  Skilled Clinical Factors: CGA for clothing mgt, seated lean for hygiene post BM.    Toilet Transfers  Technique:  (ambulating with RW)  Equipment: Grab bars, Standard toilet  Additional Factors: Verbal cues  Assistance Level: Contact guard assist  Skilled Clinical Factors: vc for hand placemenrt no LOB observed      SPEECH:      Objective:  BP (!) 146/74   Pulse 67   Temp 97.3 °F (36.3 °C) (Oral)   Resp 16   Ht 5' 3\" (1.6 m)   Wt 125 lb 4 oz (56.8 kg)   SpO2 96%   BMI 22.19 kg/m²       GEN: well developed, well nourished, NAD  HEENT: NCAT, PERRL, EOMI, mucous membranes pink and moist  CV: RRR, no murmurs, rubs or gallops  PULM: CTAB, no rales or rhonchi. Respirations WNL and unlabored  ABD: soft, NT, ND, BS+ and equal  NEURO: A&O x3. Sensation intact to light touch. MSK: Functional ROM BUEs and LLE. Weakness impairs ROM and strength RLE. Shirin Raddle Strength 5/5 key muscles BUEs and LLE. R hip flexion 4/5, R knee extension 4+/5. EXTREMITIES: No calf tenderness to palpation bilaterally. Post op edema proximal RLE  SKIN: warm dry and intact with good turgor. R lateral hip incision well approximated with some serosanguinous drainage. PSYCH: appropriately interactive. Affect WNL. Diagnostics:     CBC: No results for input(s): WBC, RBC, HGB, HCT, MCV, RDW, PLT in the last 72 hours. BMP: No results for input(s): NA, K, CL, CO2, PHOS, BUN, CREATININE, CA, GLUCOSE in the last 72 hours. BNP: No results for input(s): BNP in the last 72 hours. PT/INR: No results for input(s): PROTIME, INR in the last 72 hours. APTT: No results for input(s): APTT in the last 72 hours. CARDIAC ENZYMES: No results for input(s): CKMB, CKMBINDEX, TROPONINT in the last 72 hours. Invalid input(s): CKTOTAL;3 troponins   FASTING LIPID PANEL:  Lab Results   Component Value Date    CHOL 142 10/21/2014    HDL 58 10/21/2014    TRIG 62 10/21/2014     LIVER PROFILE: No results for input(s): AST, ALT, ALB, BILIDIR, BILITOT, ALKPHOS in the last 72 hours.      Current Medications:   Current Facility-Administered Medications: nystatin (MYCOSTATIN) 194757 UNIT/ML suspension 100,000 Units, 1 mL, Oral, BID PRN  oxyCODONE-acetaminophen (PERCOCET) 5-325 MG per tablet 1 tablet, 1 tablet, Oral, Q4H PRN  enoxaparin (LOVENOX) injection 40 mg, 40 mg, SubCUTAneous, Daily  amLODIPine (NORVASC) tablet 5 mg, 5 mg, Oral, Daily  acetaminophen (TYLENOL) tablet 650 mg, 650 mg, Oral, Q4H PRN  polyethylene glycol (GLYCOLAX) packet 17 g, 17 g, Oral, Daily  senna (SENOKOT) tablet 17.2 mg, 2 tablet, Oral, Daily PRN  bisacodyl (DULCOLAX) suppository 10 mg, 10 mg, Rectal, Daily PRN  lactobacillus (CULTURELLE) capsule 1 capsule, 1 capsule, Oral, TID WC  aspirin chewable tablet 81 mg, 81 mg, Oral, Daily  ascorbic acid (VITAMIN C) tablet 500 mg, 500 mg, Oral, Once per day on Mon Thu  Vitamin D (CHOLECALCIFEROL) tablet 1,000 Units, 1,000 Units, Oral, Daily  hydroxychloroquine (PLAQUENIL) tablet 200 mg, 200 mg, Oral, Daily  levothyroxine (SYNTHROID) tablet 112 mcg, 112 mcg, Oral, Daily  guaiFENesin (MUCINEX) extended release tablet 1,200 mg, 1,200 mg, Oral, Daily  nicotine (NICODERM CQ) 7 MG/24HR 1 patch, 1 patch, TransDERmal, Daily  promethazine (PHENERGAN) tablet 12.5 mg, 12.5 mg, Oral, Q8H PRN  calcium elemental (OSCAL) tablet 500 mg, 500 mg, Oral, Once per day on Mon Thu  fluocinonide (LIDEX) 0.05 % cream, , Topical, BID      Impression/Plan:   Impaired ADLs, gait, and mobility due to: Fall with R hip fracture: s/p IM nail. PT/OT  for gait, mobility, strengthening, endurance, ADLs, and self care. Has Percocet prn pain. WBAT. On Calcium/vitamin D for bone healing. UTI: completing Keflex 9/19/22. Blood loss anemia: Hb low but stable. Monitoring  HTN: controlled without medication. Having some orthostasis  Hypothyroidism: on levothyroxine  Nicotine dependence: on Nicoderm  Hyponatremia: Monitoring Na. Chronic sinusitis: on high dose Mucinex at home  CKD: Monitoring renal function  Lupus: on Plaquenil. Antiphospholipid antibody syndrome/Hx PE: has sissy filter. On Lovenox  Bowel Management: Miralax daily, Senokot prn, Dulcolax prn  Internal medicine for medical management  DVT prophylaxis:  on Lovenox for 4 weeks post-op.        Electronically signed by Krystal Arceo MD on 9/19/2022 at 10:51 AM      This note is created with the assistance of a speech recognition program.  While intending to generate a document that actually reflects the content of the visit, the document can still have some errors including those of syntax and sound a like substitutions which may escape proof reading. In such instances, actual meaning can be extrapolated by contextual diversion.

## 2022-09-19 NOTE — PROGRESS NOTES
Physical Therapy  Facility/Department: [facility] ACUTE REH[facility]  Rehabilitation Physical Therapy    NAME: Charles Resendiz  : 1945 (78 y.o.)  MRN: [id number]  CODE STATUS: Full Code    Date of Service: 22      Past Medical History:   Diagnosis Date    CKD (chronic kidney disease) stage 3, GFR 30-59 ml/min     Dehydration     Dysphagia     GERD (gastroesophageal reflux disease)     Abelardo filter in place     History of DVT (deep vein thrombosis)     Hx: UTI (urinary tract infection)     Hypertension     Hyperthyroidism     XRT    Hyperthyroidism     Hypothyroidism     due to Rx for Graves disease    Iron deficiency anemia     Lupus (HCC)     Metabolic acidosis     Pharyngitis     Pulmonary embolism (HCC)     Pyelonephritis     Pyelonephritis     Renal abscess     Right kidney mass     Seizures ([address]     SLE ([place] encephalitis)      Past Surgical History:   Procedure Laterality Date    APPENDECTOMY      BUNIONECTOMY      BUNIONECTOMY      COLONOSCOPY      HYSTERECTOMY      REVISION COLOSTOMY      VENA CAVA FILTER PLACEMENT         Chart Reviewed: Yes  Patient assessed for rehabilitation services?: Yes  Additional Pertinent Hx: The patient is a 78 y.o. female, S/P mechanical fall at home with subsequent sustained R femur fracture, S/P Intramedullary trochanteric fixation nail to R Hip Performed by Dr. Rafi Marcos on 22  Family / Caregiver Present: No  Referring Practitioner: Dr. Lakesha Kimball  Referral Date : 09/15/22  Diagnosis: Closed R Hip Fracture; S/P R IM nailing 22    Restrictions:  Restrictions/Precautions: Weight Bearing; Fall Risk;General Precautions; Up as Tolerated  Lower Extremity Weight Bearing Restrictions  Right Lower Extremity Weight Bearing: Weight Bearing As Tolerated (FWBAT)  Position Activity Restriction  Other position/activity restrictions: RLE FWBAT     SUBJECTIVE  Subjective: patient arrived to therapy gym via . No new complaints.  states had a intense dream last night where she was screaming out and Nursing assisted her. pt also reporting LLE numbness knee down today. noted legs swollen today not new swelling. BLE skin inspected and TEDs checked for tightness. both within normal expectations. pm same complaints of leg numbness. Recommended patient discuss with MD. Notified RN Bienvenido      OBJECTIVE          Functional Mobility  Bed mobility  Bridging: Contact guard assistance  Rolling to Left: Minimal assistance  Rolling to Right: Minimal assistance  Supine to Sit: Contact guard assistance;Stand by assistance  Sit to Supine: Minimal assistance  Scooting: Minimal assistance  Bed Mobility Comments: patient prefers self assisting RLE into bed with UE support vs AD  Transfers  Sit to Stand: Contact guard assistance;Minimal Assistance  Stand to sit: Contact guard assistance  Stand Pivot Transfers: Contact guard assistance  Comment: Intermittently demo's posterior lean upon standing, LOB x 2 and reported dizziness and needed to sit to correct stability. TOILET transfer CGA; TOILETING CGA to steady pt;    Environmental Mobility  Ambulation  Surface: level tile  Device: Rolling Walker  Other Apparatus:  (Writer Follows with W/C)  Assistance: Stand by assistance  Quality of Gait: Flexed posture, decreased STEVE, decreased step length  Gait Deviations: Slow Natalya;Decreased step length;Decreased step height  Distance: 98 feet, 137 feet; pm 207 feet pt reporting arm fatiguing and note pt with increased forward flex at hips and bearing increased weight support on walker.   Comments: fatigue with gait distance  More Ambulation?: Yes  Stairs/Curb  Stairs?: Yes  Stairs  # Steps : 6  Stairs Height: 6\"  Rails: Right ascending  Curbs: 6\"  Assistance: Contact guard assistance  Comment: With Cues/Edu/Demo pt demos good carry over sequence and stepping pattern to maximize safety    PT Exercises  Exercise Treatment: supine BLE  Exercise Equipment: NuStep 15 minutes pm L4    ASSESSMENT       Activity Tolerance  Activity Tolerance: Patient tolerated treatment well                GOALS  Patient Goals   Patient goals : To walk better  Short Term Goals  Time Frame for Short term goals: 5 days  Short term goal 1: Pt to demo All bed mobility with CGA using Gait belt as leglift assist as needed  Short term goal 2: pt to demo all transfers with RW and SBA  Short term goal 3: pt to ambulate 150' with RW and SBA  Short term goal 4: pt to negotiate 5 steps with rails and AD as needed to allow for safe home access. Short term goal 5: pt to identify effective methods for reducing pain and swelling (including proper LE Elevation techinque/Icing Technique)  Additional Goals?: Yes  Short Term Goal 6: Pt to demonstrate good technique for HEP exercises to improve pt's independence with self-care exercise routine  Long Term Goals  Time Frame for Long term goals : Until D/C  Long term goal 1: Pt to demo All bed mobility independently using Gait belt as leglift assist as needed  Long term goal 2: pt to demo all transfers with RW Mod I  Long term goal 3: pt to ambulate 150' with RW Mod I on level surfaces, 10-30' ramped surface RW Supervision, and Outdoor terrain with RW and SBA  Long term goal 4: pt to negotiate 5 steps with rails, SBA, and AD as needed to allow for safe home access. Long term goal 5: pt to demo simulated or actual car transfer with safe technique, appropriate device, and SBA  Additional Goals?: Yes  Long term goal 6: Pt to Improve Distance on 2MWT By 113' to demo Minimal Detectable Change in walking speed. Goal is to walk 198-200' in 2 Minutes. PLAN OF CARE  Frequency: 1-2 treatment sessions per day, 5-7 days per week  Safety Devices  Type of Devices: Gait belt;Call light within reach    EDUCATION  Education  Education Given To: Patient  Education Provided:  Mobility Training;Transfer Training  Education Provided Comments: transfers and therex  Education Method: Verbal  Barriers to Learning: None  Education

## 2022-09-19 NOTE — PROGRESS NOTES
Pt slept through out the night. Pain controlled with PRN percocet. Pt dressing changed to the rt hip. Noted large amt of serosang drainage to the proximal dressing. Pt ambulating one assist and walker to the bathroom without difficulty.

## 2022-09-19 NOTE — CONSULTS
Medication Sig Start Date End Date Taking? Authorizing Provider   calcium carbonate 600 MG TABS tablet Take 650 mg by mouth Twice a Week   Yes Historical Provider, MD   vitamin D (CHOLECALCIFEROL) 25 MCG (1000 UT) TABS tablet Take 1,000 Units by mouth daily   Yes Historical Provider, MD   nicotine (NICODERM CQ) 7 MG/24HR Place 1 patch onto the skin every 24 hours   Yes Historical Provider, MD   LACTOBACILLUS ACID-PECTIN PO Take 100 mg by mouth 3 times daily (with meals)   Yes Historical Provider, MD   cephALEXin (KEFLEX) 500 MG capsule Take 500 mg by mouth in the morning and at bedtime For 4 days   Yes Historical Provider, MD   dextromethorphan-guaiFENesin (MUCINEX DM)  MG per extended release tablet Take 2 tablets by mouth daily Reports drainage from her sinuses   Yes Historical Provider, MD   promethazine (PHENERGAN) 12.5 MG tablet Take 12.5 mg by mouth every 8 hours as needed for Nausea   Yes Historical Provider, MD   triamcinolone (KENALOG) 0.025 % ointment Apply 1 Applicatorful topically 2 times daily Apply topically 2 times daily. Yes Historical Provider, MD   hydroxychloroquine (PLAQUENIL) 200 MG tablet Take by mouth daily    Historical Provider, MD   aspirin (ASPIR-LOW) 81 MG EC tablet Take 1 tablet by mouth daily. 10/22/14   Crafton Phlegm, DPM   levothyroxine (LEVOTHROID) 112 MCG tablet Take 1 tablet by mouth Daily. 10/22/14   Marval Board, DO   b complex vitamins capsule Take 1 capsule by mouth Twice a Week    Historical Provider, MD        Allergies:     Ertapenem, Codeine, and Nubain [nalbuphine hcl]    Social History:     Tobacco:    reports that she quit smoking about 12 years ago. She has a 18.50 pack-year smoking history. She has never used smokeless tobacco.  Alcohol:      reports current alcohol use. Drug Use:  reports no history of drug use.     Family History:     Family History   Problem Relation Age of Onset    High Blood Pressure Mother     Stroke Mother     Substance Abuse Mother     Cancer Father     Heart Disease Father     High Blood Pressure Sister     Substance Abuse Brother     Stroke Maternal Grandmother     Stroke Paternal Grandfather        Review of Systems:     Positive and Negative as described in HPI. CONSTITUTIONAL:  negative for fevers, chills, sweats, fatigue, weight loss  HEENT:  negative for vision, hearing changes, runny nose, throat pain  RESPIRATORY:  negative for shortness of breath, cough, congestion, wheezing. CARDIOVASCULAR:  negative for chest pain, palpitations. GASTROINTESTINAL:  negative for nausea, vomiting, diarrhea, constipation, change in bowel habits, abdominal pain   GENITOURINARY:  negative for difficulty of urination, burning with urination, frequency   INTEGUMENT:  negative for rash, skin lesions, easy bruising   HEMATOLOGIC/LYMPHATIC:  negative for swelling/edema   ALLERGIC/IMMUNOLOGIC:  negative for urticaria , itching  ENDOCRINE:  negative increase in drinking, increase in urination, hot or cold intolerance  MUSCULOSKELETAL:  post op right  hip pain, negative joint pains, muscle aches, swelling of joints  NEUROLOGICAL:  negative for headaches, dizziness, lightheadedness, numbness, pain, tingling extremities      Physical Exam:     BP (!) 146/74   Pulse 67   Temp 97.3 °F (36.3 °C) (Oral)   Resp 16   Ht 5' 3\" (1.6 m)   Wt 125 lb 4 oz (56.8 kg)   SpO2 96%   BMI 22.19 kg/m²   Temp (24hrs), Av °F (36.7 °C), Min:97.3 °F (36.3 °C), Max:98.6 °F (37 °C)        General Appearance:  alert, well appearing, and in no acute distress  Head:  normocephalic, atraumatic.   Eye: no icterus, redness, pupils equal and reactive, extraocular eye movements intact, conjunctiva clear  Ear: normal external ear, no discharge, hearing intact  Nose:  no drainage noted  Mouth: mucous membranes moist  Neck: supple, no carotid bruits, thyroid not palpable  Lungs: Bilateral equal air entry, clear to ausculation, no wheezing, rales or rhonchi, normal effort  Cardiovascular: normal rate, regular rhythm, no murmur, gallop, rub. Abdomen: Soft, nontender, nondistended, normal bowel sounds, no hepatomegaly or splenomegaly  Neurologic: There are no new focal motor or sensory deficits, normal muscle tone and bulk, no abnormal sensation, normal speech, cranial nerves II through XII grossly intact  Skin: No gross lesions, rashes, bruising or bleeding on exposed skin area  Extremities: right hip pain and restriction ROM,  peripheral pulses palpable, no pedal edema or calf pain with palpation  Psych: normal affect    Investigations:      Laboratory Testing:  No results found for this or any previous visit (from the past 24 hour(s)). Imaging/Diagonstics:  Recent data reviewed    Assessment :      Primary Problem  Fracture of hip, closed, right, sequela    Active Hospital Problems    Diagnosis Date Noted    Fracture of hip, closed, right, sequela [S72.001S] 09/15/2022     Priority: Medium       Plan:     Debility secondary to right hip surgery ORIF for fracture-continue with physical therapy  Sjogren syndrome-continue with Plaquenil  CKD stage III-creatinine monitoring, 1.02 on admission  Acute blood loss anemia noted postop-will monitor hemoglobin. 9.0 today on admission  Hypothyroid-resume Synthroid  UTI-complete course of Keflex  Mild hyponatremia-monitor sodium; 131 on admission  History of PE, APL antibody-IVC filter in situ    DVT prophylaxis-mechanical only    9/18  Patient reports no new complaints. Engaging with physical therapy. Labs and vitals reviewed. No new issues. Continue with current care. Consultations:   Emanuel Calhoun MD  9/19/2022  10:50 AM    Copy sent to Dr. Ochoa Ar, DO    Please note that this chart was generated using voice recognition Dragon dictation software.   Although every effort was made to ensure the accuracy of this automated transcription, some errors in transcription may have occurred.

## 2022-09-19 NOTE — PROGRESS NOTES
Occupational Therapy  Facility/Department: [facility]  Rehabilitation Occupational Therapy Daily Treatment Note    Date: 22  Patient Name: Akanksha Lua       Room: [facility]/5512-  MRN: [id number]  Account: [de-identified]   : 1945  (78 y.o.) Gender: female     Referring Practitioner: Petrona Norris MD  Diagnosis: Fracture of hip, closed, right, sequela           Past Medical History:  has a past medical history of CKD (chronic kidney disease) stage 3, GFR 30-59 ml/min, Dehydration, Dysphagia, GERD (gastroesophageal reflux disease), [place] filter in place, History of DVT (deep vein thrombosis), Hx: UTI (urinary tract infection), Hypertension, Hyperthyroidism, Hyperthyroidism, Hypothyroidism, Iron deficiency anemia, Lupus ([address] Metabolic acidosis, Pharyngitis, Pulmonary embolism ([address] Pyelonephritis, Pyelonephritis, Renal abscess, Right kidney mass, Seizures ([address] and SLE ([address] [facility] encephalitis). Past Surgical History:   has a past surgical history that includes Vena Cava Filter Placement; Hysterectomy; Bunionectomy; Appendectomy; Colonoscopy; Bunionectomy; and Revision Colostomy. Restrictions  Restrictions/Precautions: Weight Bearing; Fall Risk;General Precautions; Up as Tolerated  Implants present? : Metal implants (R ERNESTO)  Other position/activity restrictions: RLE FWBAT  Right Lower Extremity Weight Bearing: Weight Bearing As Tolerated (FWBAT)  Required Braces or Orthoses?: No    Subjective  Subjective: PM: Pleasant and agreeable to therapy this afternoon. pt became emotional during session due to thoughts of her 's passing a year ago and no longer being with her during this time. Restrictions/Precautions: Weight Bearing; Fall Risk;General Precautions; Up as Tolerated        Pain Assessment  Response to Pain Intervention: None (pain unchanged or no improvement)    Objective     Cognition  Overall Cognitive Status: WFL  Orientation  Overall Orientation Status: Within Functional Limits  Orientation Level: Oriented X4        Functional Mobility  Device: Wheelchair  Activity: To/From therapy gym  Assistance Level: Supervision     OT Exercises  Exercise Treatment: PM: BUE exercises with 1# weight facilitated in all tolerated planes for 25 reps to support strength needed to engage in functional tasks safely and indep. Pt akilah DOWLING tolerance with activity  Motor Control/Coordination: PM: pt engaged in fine motor activity while seated in a w/c at table top matching colored blocks in slots following a pattern. pt req 2 v/c to orient the blocks correctly. Assessment  Assessment  Activity Tolerance: Patient tolerated treatment well  Discharge Recommendations: Home with assist PRN;Patient would benefit from continued therapy after discharge  OT Equipment Recommendations  Equipment Needed: Yes  Mobility Devices: ADL Assistive Devices  ADL Assistive Devices: Shower Chair with back; Reacher;Sock-Aid Hard;Long-handled Sponge  Other: TBD  Safety Devices  Safety Devices in place: Yes  Type of devices: Left in chair;Gait belt    Patient Education  Education  Education Given To: Patient  Education Provided: Role of Therapy;Plan of Care;ADL Function;Transfer Training  Education Method: Verbal;Demonstration  Barriers to Learning: None  Education Outcome: Verbalized understanding;Demonstrated understanding    Plan  Plan  Times per Week: 5-7  Times per Day: Twice a day  Current Treatment Recommendations: Strengthening;ROM;Balance training;Functional mobility training; Endurance training;Pain management; Safety education & training;Patient/Caregiver education & training;Equipment evaluation, education, & procurement;Self-Care / ADL;Return to work related activity; Coordination training    Goals  Patient Goals   Patient goals : \"To get as much accomplished as I can and to come out stronger to keep up with my jobs, my grandchildren, and the little ones\"  Short Term Goals  Time Frame for Short term goals:  One week  Short Term Goal 1: Pt will complete LB dressing/bathing/toileting tasks with CGA and Good safety to improve IND and safety with self care tasks. Short Term Goal 2: Pt will complete UB dressing/bathing tasks with supervision and Good safety to improve IND and safety with self care tasks. Short Term Goal 3: Pt will complete functional transfers/mobility with CGA and Good safety to improve IND and safety with self care and mobility tasks. Short Term Goal 4: Pt will tolerate standing for 6+ minutes during functional activity of choice with CGA and Good safety to improve IND and safety with self care and mobility tasks. Short Term Goal 5: Pt will verbalize/demonstrate Good understanding of at least 3 AD/DME/modified techniques to improve IND and safety with self care and mobility tasks. Additional Goals?: Yes  Short Term Goal 6: OTR/EM to further assess FMC/ strength. Short Term Goal 7: Pt will participate in 30+ minutes of therapeutic exercises/functional activities to improve IND and safety with self care and mobility tasks. Long Term Goals  Time Frame for Long term goals : By discharge  Long Term Goal 1: Pt will complete BADLs with modified IND and Good safety  to improve IND and safety with self care tasks. Long Term Goal 2: Pt will complete functional transfers/mobility with modified IND and Good safety to improve IND and safety with self care and mobility tasks. Long Term Goal 3: Pt will tolerate standing for 12+ minutes during functional activity of choice with modified IND and Good safety to improve IND and safety with self care and mobility tasks. Long Term Goal 4: Pt will verbalize/demonstrate Good understanding of at least 3 fall prevention/home safety strategies to improve IND and safety with self care and mobility tasks. Long Term Goal 5: Pt will complete medication management task with 100% accuracy with use of adaptive strategies to improve safety.   Additional Goals?: Yes  Long Term Goal 6: Pt will complete light housekeeping/meal preparation task with supervision and Good safety to improve IND and safety with ADL/IADL tasks.                 Therapy Time   Individual Concurrent Group Co-treatment   Time In 1404         Time Out 1450         Minutes ALEX Santos

## 2022-09-19 NOTE — PLAN OF CARE
Problem: Discharge Planning  Goal: Discharge to home or other facility with appropriate resources  9/19/2022 0428 by Carl Mathew RN  Outcome: Progressing     Problem: Safety - Adult  Goal: Free from fall injury  9/19/2022 0428 by Carl Mathew RN  Outcome: Progressing     Problem: ABCDS Injury Assessment  Goal: Absence of physical injury  9/19/2022 0428 by Carl Mathew RN  Outcome: Progressing     Problem: Nutrition Deficit:  Goal: Optimize nutritional status  9/19/2022 0428 by Carl Mathew RN  Outcome: Progressing     Problem: Pain  Goal: Verbalizes/displays adequate comfort level or baseline comfort level  9/19/2022 0428 by Carl Mathew RN  Outcome: Progressing     Problem: Skin/Tissue Integrity  Goal: Absence of new skin breakdown  Description: 1. Monitor for areas of redness and/or skin breakdown  2. Assess vascular access sites hourly  3. Every 4-6 hours minimum:  Change oxygen saturation probe site  4. Every 4-6 hours:  If on nasal continuous positive airway pressure, respiratory therapy assess nares and determine need for appliance change or resting period.   9/19/2022 0428 by Carl Mathew RN  Outcome: Progressing

## 2022-09-19 NOTE — PROGRESS NOTES
[facility] Internal Medicine    Progress note            Date:   9/18/2022  Patient name:  Jamin Rivero  Date of admission:  9/15/2022  2:31 PM  MRN:   [id number]  Account:  [de-identified]  YOB: 1945  PCP:    Rina Tang DO  Room:   2617/2617-01  Code Status:    Full Code    Physician Requesting Consult: Jaspal Anaya MD    Reason for Consult:  Medical management    Chief Complaint:     No chief complaint on file. Debility secondary to right hip surgery    History Obtained From:     Patient, EMR, nursing staff    History of Present Illness:     79-year-old female admitted from outside hospital after ORIF for right hip fracture performed on 9/9.   Course complicated by anemia hemoglobin to 5.9 status post transfusion medical history significant for Sjogren's for which she is taking Plaquenil, CKD stage III antiphospholipid antibody syndrome, history of PE patient has IVC filter not on any AC      Past Medical History:     Past Medical History:   Diagnosis Date    CKD (chronic kidney disease) stage 3, GFR 30-59 ml/min     Dehydration     Dysphagia     GERD (gastroesophageal reflux disease)     [place] filter in place     History of DVT (deep vein thrombosis)     Hx: UTI (urinary tract infection)     Hypertension     Hyperthyroidism 1995    XRT    Hyperthyroidism     Hypothyroidism     due to Rx for Graves disease    Iron deficiency anemia     Lupus (HCC)     Metabolic acidosis     Pharyngitis     Pulmonary embolism (HCC)     Pyelonephritis     Pyelonephritis     Renal abscess     Right kidney mass     Seizures ([address]     SLE ([place] encephalitis)         Past Surgical History:     Past Surgical History:   Procedure Laterality Date    APPENDECTOMY      BUNIONECTOMY      BUNIONECTOMY      COLONOSCOPY      HYSTERECTOMY      REVISION COLOSTOMY      VENA CAVA FILTER PLACEMENT          Medications Prior to Admission:     Prior to Admission medications Medication Sig Start Date End Date Taking? Authorizing Provider   calcium carbonate 600 MG TABS tablet Take 650 mg by mouth Twice a Week   Yes Historical Provider, MD   vitamin D (CHOLECALCIFEROL) 25 MCG (1000 UT) TABS tablet Take 1,000 Units by mouth daily   Yes Historical Provider, MD   nicotine (NICODERM CQ) 7 MG/24HR Place 1 patch onto the skin every 24 hours   Yes Historical Provider, MD   LACTOBACILLUS ACID-PECTIN PO Take 100 mg by mouth 3 times daily (with meals)   Yes Historical Provider, MD   cephALEXin (KEFLEX) 500 MG capsule Take 500 mg by mouth in the morning and at bedtime For 4 days   Yes Historical Provider, MD   dextromethorphan-guaiFENesin (MUCINEX DM)  MG per extended release tablet Take 2 tablets by mouth daily Reports drainage from her sinuses   Yes Historical Provider, MD   promethazine (PHENERGAN) 12.5 MG tablet Take 12.5 mg by mouth every 8 hours as needed for Nausea   Yes Historical Provider, MD   triamcinolone (KENALOG) 0.025 % ointment Apply 1 Applicatorful topically 2 times daily Apply topically 2 times daily. Yes Historical Provider, MD   hydroxychloroquine (PLAQUENIL) 200 MG tablet Take by mouth daily    Historical Provider, MD   aspirin (ASPIR-LOW) 81 MG EC tablet Take 1 tablet by mouth daily. 10/22/14   Renata Cleveland DPM   levothyroxine (LEVOTHROID) 112 MCG tablet Take 1 tablet by mouth Daily. 10/22/14   Shilpa Yadav DO   b complex vitamins capsule Take 1 capsule by mouth Twice a Week    Historical Provider, MD        Allergies:     Ertapenem, Codeine, and Nubain [nalbuphine hcl]    Social History:     Tobacco:    reports that she quit smoking about 12 years ago. She has a 18.50 pack-year smoking history. She has never used smokeless tobacco.  Alcohol:      reports current alcohol use. Drug Use:  reports no history of drug use.     Family History:     Family History   Problem Relation Age of Onset    High Blood Pressure Mother     Stroke Mother     Substance Abuse Mother     Cancer Father     Heart Disease Father     High Blood Pressure Sister     Substance Abuse Brother     Stroke Maternal Grandmother     Stroke Paternal Grandfather        Review of Systems:     Positive and Negative as described in HPI. CONSTITUTIONAL:  negative for fevers, chills, sweats, fatigue, weight loss  HEENT:  negative for vision, hearing changes, runny nose, throat pain  RESPIRATORY:  negative for shortness of breath, cough, congestion, wheezing. CARDIOVASCULAR:  negative for chest pain, palpitations. GASTROINTESTINAL:  negative for nausea, vomiting, diarrhea, constipation, change in bowel habits, abdominal pain   GENITOURINARY:  negative for difficulty of urination, burning with urination, frequency   INTEGUMENT:  negative for rash, skin lesions, easy bruising   HEMATOLOGIC/LYMPHATIC:  negative for swelling/edema   ALLERGIC/IMMUNOLOGIC:  negative for urticaria , itching  ENDOCRINE:  negative increase in drinking, increase in urination, hot or cold intolerance  MUSCULOSKELETAL:  post op right  hip pain, negative joint pains, muscle aches, swelling of joints  NEUROLOGICAL:  negative for headaches, dizziness, lightheadedness, numbness, pain, tingling extremities      Physical Exam:     BP (!) 146/74   Pulse 67   Temp 97.3 °F (36.3 °C) (Oral)   Resp 16   Ht 5' 3\" (1.6 m)   Wt 125 lb 4 oz (56.8 kg)   SpO2 96%   BMI 22.19 kg/m²   Temp (24hrs), Av °F (36.7 °C), Min:97.3 °F (36.3 °C), Max:98.6 °F (37 °C)        General Appearance:  alert, well appearing, and in no acute distress  Head:  normocephalic, atraumatic.   Eye: no icterus, redness, pupils equal and reactive, extraocular eye movements intact, conjunctiva clear  Ear: normal external ear, no discharge, hearing intact  Nose:  no drainage noted  Mouth: mucous membranes moist  Neck: supple, no carotid bruits, thyroid not palpable  Lungs: Bilateral equal air entry, clear to ausculation, no wheezing, rales or rhonchi, normal effort  Cardiovascular: normal rate, regular rhythm, no murmur, gallop, rub. Abdomen: Soft, nontender, nondistended, normal bowel sounds, no hepatomegaly or splenomegaly  Neurologic: There are no new focal motor or sensory deficits, normal muscle tone and bulk, no abnormal sensation, normal speech, cranial nerves II through XII grossly intact  Skin: No gross lesions, rashes, bruising or bleeding on exposed skin area  Extremities: right hip pain and restriction ROM,  peripheral pulses palpable, no pedal edema or calf pain with palpation  Psych: normal affect    Investigations:      Laboratory Testing:  No results found for this or any previous visit (from the past 24 hour(s)). Imaging/Diagonstics:  Recent data reviewed    Assessment :      Primary Problem  Fracture of hip, closed, right, sequela    Active Hospital Problems    Diagnosis Date Noted    Fracture of hip, closed, right, sequela [S72.001S] 09/15/2022     Priority: Medium       Plan:     Debility secondary to right hip surgery ORIF for fracture-continue with physical therapy  Sjogren syndrome-continue with Plaquenil  CKD stage III-creatinine monitoring, 1.02 on admission  Acute blood loss anemia noted postop-will monitor hemoglobin. 9.0 today on admission  Hypothyroid-resume Synthroid  UTI-complete course of Keflex  Mild hyponatremia-monitor sodium; 131 on admission  History of PE, APL antibody-IVC filter in situ    DVT prophylaxis-mechanical only       9/19  Patient reports no new complaints. Engaging with physical therapy. Labs and vitals reviewed. No new issues. Continue with current care. Consultations:   Radha Quiroz MD  9/19/2022  10:52 AM    Copy sent to Dr. Maurice Worley, DO    Please note that this chart was generated using voice recognition Dragon dictation software.   Although every effort was made to ensure the accuracy of this automated transcription, some errors in transcription may have occurred.

## 2022-09-19 NOTE — PATIENT CARE CONFERENCE
[facility] Acute Inpatient Rehabilitation  TEAM CONFERENCE NOTE  Date: 22  Patient Name: Мария Alegria       Room: 4264/5733-92  MRN: [id number]       : 1945  (78 y.o.)     Gender: female   Referring Practitioner: Dr. Addison Hoffmann   Fracture of hip, closed, right, sequela [S72.001S]  Diagnosis: Fracture of hip, closed, right, sequela     NURSING  Bladder  Always Continent  Bowel   Always Continent  Date of Last BM: 2022  Intervention    Both Bowel & Bladder Program     Wounds/Incisions/Ulcers: Incision healing well Right upper anterior hip    Pain: Patient's pain is currently controlled with 1 tab Percocet, prn Q4 last given today  -tylenol 650 mg PRN- hasn't taken since admission    Fall Risk:  Falling star program initiated    Medication Education Program: Patient able to manage medications and being educated by nursing    Discharge Preparation Patient/Responsible Party Education In Progress:   [] Fingerstick Glucose Monitoring and Insulin Management  [] Wound Care  [] Stoma/Ostomy Management  [] Urinary Catheter Management  [] Subcutaneous Injection Education  [] IV Infusion and IV Access Management  [] Peritoneal Dialysis    Nursing specific communication for TEAM: No additional information identified requiring communication at this time    PHYSICAL THERAPY    Bed Mobility:    Bridging: Contact guard assistance  Rolling to Left: Minimal assistance  Rolling to Right: Minimal assistance  Supine to Sit: Contact guard assistance;Stand by assistance  Sit to Supine: Minimal assistance  Scooting: Minimal assistance  Bed Mobility Comments: patient prefers self assisting RLE into bed with UE support vs AD        Transfers:  Sit to Stand: Contact guard assistance;Minimal Assistance  Stand to sit: Contact guard assistance  Comment: Intermittently demo's posterior lean upon standing, LOB x 2 and reported dizziness and needed to sit to correct stability.  TOILET transfer CGA; TOILETING CGA to steady pt    Ambulation  Surface: level tile  Device: Rolling Walker  Other Apparatus:  (Writer Follows with W/C)  Assistance: Stand by assistance  Quality of Gait: Flexed posture, decreased STEVE, decreased step length  Gait Deviations: Slow Natalya;Decreased step length;Decreased step height  Distance: 98 feet, 137 feet; pm 207 feet pt reporting arm fatiguing and note pt with increased forward flex at hips and bearing increased weight support on walker. Comments: fatigue with gait distance  More Ambulation?: Yes    Ambulation:  # Steps : 6  Stairs Height: 6\"  Rails: Right ascending  Curbs: 6\"  Assistance: Contact guard assistance  Comment: With Cues/Edu/Demo pt demos good carry over sequence and stepping pattern to maximize safety                Goals  Time Frame for Short term goals: 5 days  Short term goal 1: Pt to demo All bed mobility with CGA using Gait belt as leglift assist as needed  Short term goal 2: pt to demo all transfers with RW and SBA  Short term goal 3: pt to ambulate 150' with RW and SBA  Short term goal 4: pt to negotiate 5 steps with rails and AD as needed to allow for safe home access. Short term goal 5: pt to identify effective methods for reducing pain and swelling (including proper LE Elevation techinque/Icing Technique)  Additional Goals?: Yes  Short Term Goal 6: Pt to demonstrate good technique for HEP exercises to improve pt's independence with self-care exercise routine                OCCUPATIONAL THERAPY  SELF CARE        Feeding  Assistance Level:  Independent  Skilled Clinical Factors: per pt report          Grooming/Oral Hygiene  Assistance Level: Set-up  Skilled Clinical Factors: seated in w/c        Upper Extremity Bathing  Assistance Level: Set-up  Skilled Clinical Factors: seated in w/c       Lower Extremity Bathing  Equipment Provided: Long-handled sponge  Assistance Level: Contact guard assist  Skilled Clinical Factors: seated weight shift for suzi care, seated for washing BLEs, CGA in stance to dry buttocks/suzi area with use of shower GB for 0-1 UE support no LOB       Upper Extremity Dressing  Assistance Level: Set-up  Skilled Clinical Factors: donning/doffing [place] shirt while seated. Lower Extremity Dressing  Equipment Provided: Reachers  Assistance Level: Contact guard assist  Skilled Clinical Factors: threading pants with reacher, SBA-CGA in standing to pull over hips. Putting On/Taking Off Footwear  Equipment Provided: Reachers, Sock aid  Assistance Level: Minimal assistance  Skilled Clinical Factors: TA for TEDs, doffing footies with reacher, use of sock aid to donning footies       Toileting  Assistance Level: Contact guard assist  Skilled Clinical Factors: CGA for clothing mgt, seated lean for hygiene post BM. Toilet Transfers  Technique:  (ambulating with RW)  Equipment: Grab bars, Standard toilet  Additional Factors: Verbal cues  Assistance Level: Contact guard assist  Skilled Clinical Factors: vc for hand placemenrt no LOB observed    Short Term Goals  Time Frame for Short term goals: One week  Short Term Goal 1: Pt will complete LB dressing/bathing/toileting tasks with CGA and Good safety to improve IND and safety with self care tasks. Short Term Goal 2: Pt will complete UB dressing/bathing tasks with supervision and Good safety to improve IND and safety with self care tasks. Short Term Goal 3: Pt will complete functional transfers/mobility with CGA and Good safety to improve IND and safety with self care and mobility tasks. Short Term Goal 4: Pt will tolerate standing for 6+ minutes during functional activity of choice with CGA and Good safety to improve IND and safety with self care and mobility tasks. Short Term Goal 5: Pt will verbalize/demonstrate Good understanding of at least 3 AD/DME/modified techniques to improve IND and safety with self care and mobility tasks.   Additional Goals?: Yes  Short Term Goal 6: OTR/EM to further assess FMC/ strength. Short Term Goal 7: Pt will participate in 30+ minutes of therapeutic exercises/functional activities to improve IND and safety with self care and mobility tasks. SPEECH THERAPY      THERAPY MINUTE COMPLIANCE  Patient is participating and compliant with meeting therapy minutes as outlined in plan of care: Yes    NUTRITION  Weight: 125 lb 4 oz (56.8 kg) / Body mass index is 22.19 kg/m². Diet Rx: Regular. PO intake appears adequate. Please see nutrition note for details.     SOCIAL WORK ASSESSMENT    Discharge Disposition: Home with 12 y.o son and OP at [facility]  Family Support: son and brother    PCP Established: [x] Yes [] No (If No: Specify Status of Designation)    Services Being Followed In Preparation For Discharge: (Check All That Apply)  [] [address] [place])  [x] Outpatient Bhavani Munoz)  [] Dialysis   [] Hemodialysis (Specify Location/Days/Chair Times)   [] Peritoneal Dialysis  [] IV Infusion Services  [] Enteral Nutrition Services  [] Oxygen  [] Stoma/Ostomy  [] Catheter  [x] Lovenox    Pre-Admission Status:  Lives With: Family (Son- 12years old)  Type of Home:  (Doug Mercado)  Home Layout: One level, Performs ADL's on one level, Able to Live on Main level with bedroom/bathroom  Home Access: Stairs to enter without rails (Per pt - School/[employer] friends are able to install entry stair railings)  Entrance Stairs - Number of Steps: 2  Bathroom Shower/Tub: Tub/Shower unit, Doors  Bathroom Toilet: Handicap height  Bathroom Equipment:  (Sink next to toilet)  Bathroom Accessibility: Walker accessible  Home Equipment: Walker, rolling, Rollator, Crutches  Has the patient had two or more falls in the past year or any fall with injury in the past year?: Yes (Fall led to hip surgery)  Receives Help From: Friend(s), Family  ADL Assistance: Independent  Homemaking Assistance: Independent  Homemaking Responsibilities: Yes  Ambulation Assistance: Independent  Transfer Assistance: Independent  Active : Yes  Mode of Transportation: Car  Occupation: Part time employment  Type of Occupation: Pre-K aide  Leisure & Hobbies: Hangout with grandsons, go to pumpkin patch  IADL Comments: Flat regular bed  Additional Comments: Son is in [address] who lives with pt. Pt reports having other family and friends in the area who can provide assist as needed. Pt was not receiving OT/PT before the surgery. Family Education: Need to make contact with family to initiate education    Percentage Risk for Readmission: Low 0 - 18%   Readmission Risk              Risk of Unplanned Readmission:  9       %    Critical Items: None       Problem / Barrier Intervention / Plan  Results   Impaired function related to R Hip fracture/surgery. ROM, strengthening, functional mobility training with assistive device    Altered ability to care for self Remediation and training in modified care strategies to increase safety and independence with self care tasks                               Total Self Care Score    Total Mobility Score  Admission Score:  24      Admission Score:  47  Goal:  42/42         Goal:  82/90   `  Discharge Plan   Estimated Discharge Date: 9/23/2022  Home evaluation needed?  No  Overnight or Day Pass: No  Factors facilitating achievement of predicted outcomes: Family support, Motivated, Cooperative, Pleasant, Good insight into deficits, and Has homemaker services  Barriers to the achievement of predicted outcomes: Pain, Limited family support, Decreased endurance, Lower extremity weakness, Skin Care, and Medication managment    Functional Goals at discharge:  Predicted Outcome: Home with familyPATIENT'S LEVEL OF ASSISTANCE: Modified independence  Discharge therapy goals:  PT: Long Term Goals  Time Frame for Long term goals : Until D/C  Long term goal 1: Pt to demo All bed mobility independently using Gait belt as leglift assist as needed  Long term goal 2: pt to demo all transfers with RW Mod I  Long term goal 3: pt to ambulate 150' with RW Mod I on level surfaces, 10-30' ramped surface RW Supervision, and Outdoor terrain with RW and SBA  Long term goal 4: pt to negotiate 5 steps with rails, SBA, and AD as needed to allow for safe home access. Long term goal 5: pt to demo simulated or actual car transfer with safe technique, appropriate device, and SBA  Additional Goals?: Yes  Long term goal 6: Pt to Improve Distance on 2MWT By 113' to demo Minimal Detectable Change in walking speed. Goal is to walk 198-200' in 2 Minutes. OT:Long Term Goals  Time Frame for Long term goals : By discharge  Long Term Goal 1: Pt will complete BADLs with modified IND and Good safety  to improve IND and safety with self care tasks. Long Term Goal 2: Pt will complete functional transfers/mobility with modified IND and Good safety to improve IND and safety with self care and mobility tasks. Long Term Goal 3: Pt will tolerate standing for 12+ minutes during functional activity of choice with modified IND and Good safety to improve IND and safety with self care and mobility tasks. Long Term Goal 4: Pt will verbalize/demonstrate Good understanding of at least 3 fall prevention/home safety strategies to improve IND and safety with self care and mobility tasks. Long Term Goal 5: Pt will complete medication management task with 100% accuracy with use of adaptive strategies to improve safety. Additional Goals?: Yes  Long Term Goal 6: Pt will complete light housekeeping/meal preparation task with supervision and Good safety to improve IND and safety with ADL/IADL tasks. ST:     Participating Team Members:  /:   IONA Arora  Occupational Therapist:  Sb Lino OT   Physical Therapist: Ute Dominique PT  Speech Therapist:  N/A  Nurse:  Sangeetha Toro RN    Dietary/Nutrition: Salinas Oconnor RD, LD  Pastoral Care: Chaplain Brandee Alberts  CMG: Penny Lehman RN    I approve the established interdisciplinary plan of care as documented within the medical record of Gus Garcia.     Ashwin Ramires MD

## 2022-09-19 NOTE — PROGRESS NOTES
09/19/22 1226   Encounter Summary   Encounter Overview/Reason  Volunteer Encounter   Service Provided For: Patient   Referral/Consult From: Thomas   Last Encounter  09/19/22  (V)   Complexity of Encounter Low   Begin Time 1000   End Time  1015   Total Time Calculated 15 min   Rituals, Rites and Sacraments   Type Sabianism Communion

## 2022-09-19 NOTE — PROGRESS NOTES
Occupational [address] [place] [address]   Acute Rehabilitation Occupational Therapy Daily Treatment Note    Date: 22  Patient Name: Mayi Jiang       Room: 2930/6364-96  MRN: [id number]  Account: [de-identified]   : 1945  (78 y.o.) Gender: female       Referring Practitioner: Jim Kuhn MD  Diagnosis: Fracture of hip, closed, right, sequela       Treatment Diagnosis: Impaired self care status    Past Medical History:  has a past medical history of CKD (chronic kidney disease) stage 3, GFR 30-59 ml/min, Dehydration, Dysphagia, GERD (gastroesophageal reflux disease), [place] filter in place, History of DVT (deep vein thrombosis), Hx: UTI (urinary tract infection), Hypertension, Hyperthyroidism, Hyperthyroidism, Hypothyroidism, Iron deficiency anemia, Lupus ([address] Metabolic acidosis, Pharyngitis, Pulmonary embolism ([address] Pyelonephritis, Pyelonephritis, Renal abscess, Right kidney mass, Seizures ([address] and SLE ([address] [facility] encephalitis). Past Surgical History:   has a past surgical history that includes Vena Cava Filter Placement; Hysterectomy; Bunionectomy; Appendectomy; Colonoscopy; Bunionectomy; and Revision Colostomy. Restrictions  Restrictions/Precautions  Restrictions/Precautions: Weight Bearing, Fall Risk, General Precautions, Up as Tolerated  Required Braces or Orthoses?: No  Implants present? : Metal implants (R ERNESTO)     Position Activity Restriction  Other position/activity restrictions: RLE FWBAT  Lower Extremity Weight Bearing Restrictions  Right Lower Extremity Weight Bearing: Weight Bearing As Tolerated (FWBAT)       Subjective    Pt in bed upon arrival, pleasant and motivated to participate in therapy today.       Pain Assessment  Pain Assessment: 0-10  Pain Level: 7  Pain Location: Hip  Pain Orientation: Right  Pain Descriptors: Stabbing    Objective  Cognition  Overall Orientation Status: Within Functional Limits     Cognition  Overall Cognitive Status: WFL    Activities of Daily Living  Feeding  Assistance Level: Independent  Skilled Clinical Factors: per pt report    Grooming/Oral Hygiene  Assistance Level: Set-up  Skilled Clinical Factors: seated in w/c    Upper Extremity Bathing  Assistance Level: Set-up  Skilled Clinical Factors: seated in w/c    Lower Extremity Bathing  Skilled Clinical Factors: Pt did not complete this date. Upper Extremity Dressing  Assistance Level: Set-up  Skilled Clinical Factors: donning/doffing [place] shirt while seated. Lower Extremity Dressing  Equipment Provided: Reachers  Assistance Level: Contact guard assist  Skilled Clinical Factors: threading pants with reacher, SBA-CGA in standing to pull over hips. Putting On/Taking Off Footwear  Equipment Provided: Reachers;Sock aid  Assistance Level: Minimal assistance  Skilled Clinical Factors: TA for TEDs, doffing footies with reacher, use of sock aid to donning footies    Toileting  Assistance Level: Contact guard assist  Skilled Clinical Factors: CGA for clothing mgt, seated lean for hygiene post BM. Toilet Transfers  Technique:  (ambulating with RW)  Equipment: Grab bars;Standard toilet  Assistance Level: Contact guard assist    Tub/Shower Transfers  Skilled Clinical Factors: Pt declined shower this date. Mobility  Bed Mobility  Overall Assistance Level: Stand By Assist  Additional Factors: Head of bed raised; With handrails              Supine to Sit  Assistance Level: Stand by assist        Sit to Stand  Assistance Level: Contact guard assist  Skilled Clinical Factors: cueing for hand placement.   Stand to Sit  Assistance Level: Contact guard assist                    Functional Mobility  Device: Rolling walker  Activity: To/From bathroom    Assessment  Assessment  Activity Tolerance: Patient tolerated treatment well  Discharge Recommendations: Home with assist PRN;Patient would benefit from continued therapy after discharge    Patient Education  Education  Education Given To: Patient  Education Provided: Role of Therapy;Plan of Care;ADL Function;Transfer Training  Education Method: Verbal;Demonstration  Barriers to Learning: None  Education Outcome: Verbalized understanding;Demonstrated understanding    OT Equipment Recommendations  Equipment Needed: Yes  Mobility Devices: ADL Assistive Devices  ADL Assistive Devices: Shower Chair with back; Reacher;Sock-Aid Hard;Long-handled Sponge  Other: TBD    Safety Devices  Safety Devices in place: Yes  Type of devices: Left in chair;Gait belt (Pt under PTA supervision in gym at end of session)       Goals  Patient Goals   Patient goals : \"To get as much accomplished as I can and to come out stronger to keep up with my jobs, my grandchildren, and the little ones\"  Short Term Goals  Time Frame for Short term goals: One week  Short Term Goal 1: Pt will complete LB dressing/bathing/toileting tasks with CGA and Good safety to improve IND and safety with self care tasks. Short Term Goal 2: Pt will complete UB dressing/bathing tasks with supervision and Good safety to improve IND and safety with self care tasks. Short Term Goal 3: Pt will complete functional transfers/mobility with CGA and Good safety to improve IND and safety with self care and mobility tasks. Short Term Goal 4: Pt will tolerate standing for 6+ minutes during functional activity of choice with CGA and Good safety to improve IND and safety with self care and mobility tasks. Short Term Goal 5: Pt will verbalize/demonstrate Good understanding of at least 3 AD/DME/modified techniques to improve IND and safety with self care and mobility tasks. Additional Goals?: Yes  Short Term Goal 6: OTR/EM to further assess FMC/ strength. Short Term Goal 7: Pt will participate in 30+ minutes of therapeutic exercises/functional activities to improve IND and safety with self care and mobility tasks.     Long Term Goals  Time Frame for Long term goals : By discharge  Long Term Goal 1: Pt will complete BADLs with modified IND and Good safety  to improve IND and safety with self care tasks. Long Term Goal 2: Pt will complete functional transfers/mobility with modified IND and Good safety to improve IND and safety with self care and mobility tasks. Long Term Goal 3: Pt will tolerate standing for 12+ minutes during functional activity of choice with modified IND and Good safety to improve IND and safety with self care and mobility tasks. Long Term Goal 4: Pt will verbalize/demonstrate Good understanding of at least 3 fall prevention/home safety strategies to improve IND and safety with self care and mobility tasks. Long Term Goal 5: Pt will complete medication management task with 100% accuracy with use of adaptive strategies to improve safety. Additional Goals?: Yes  Long Term Goal 6: Pt will complete light housekeeping/meal preparation task with supervision and Good safety to improve IND and safety with ADL/IADL tasks.     Plan  Plan  Times per Week: 5-7  Times per Day: Twice a day  Current Treatment Recommendations: Strengthening, ROM, Balance training, Functional mobility training, Endurance training, Pain management, Safety education & training, Patient/Caregiver education & training, Equipment evaluation, education, & procurement, Self-Care / ADL, Return to work related activity, Coordination training      OT Individual Minutes  OT Individual Minutes  Time In: 0800  Time Out: 0900  Minutes: 60        Electronically signed by AMBER Wright on 9/19/22 at 9:14 AM EDT

## 2022-09-20 LAB
ANION GAP SERPL CALCULATED.3IONS-SCNC: 11 MMOL/L (ref 9–17)
BUN BLDV-MCNC: 22 MG/DL (ref 8–23)
CALCIUM SERPL-MCNC: 9.4 MG/DL (ref 8.6–10.4)
CHLORIDE BLD-SCNC: 103 MMOL/L (ref 98–107)
CO2: 16 MMOL/L (ref 20–31)
CREAT SERPL-MCNC: 1.09 MG/DL (ref 0.5–0.9)
GFR AFRICAN AMERICAN: 59 ML/MIN
GFR NON-AFRICAN AMERICAN: 49 ML/MIN
GFR SERPL CREATININE-BSD FRML MDRD: ABNORMAL ML/MIN/{1.73_M2}
GLUCOSE BLD-MCNC: 82 MG/DL (ref 70–99)
HCT VFR BLD CALC: 26 % (ref 36–46)
HEMOGLOBIN: 8.7 G/DL (ref 12–16)
MCH RBC QN AUTO: 30.3 PG (ref 26–34)
MCHC RBC AUTO-ENTMCNC: 33.3 G/DL (ref 31–37)
MCV RBC AUTO: 90.9 FL (ref 80–100)
PDW BLD-RTO: 16.9 % (ref 11.5–14.9)
PLATELET # BLD: 354 K/UL (ref 150–450)
PMV BLD AUTO: 6.8 FL (ref 6–12)
POTASSIUM SERPL-SCNC: 4 MMOL/L (ref 3.7–5.3)
RBC # BLD: 2.86 M/UL (ref 4–5.2)
SODIUM BLD-SCNC: 130 MMOL/L (ref 135–144)
WBC # BLD: 4.3 K/UL (ref 3.5–11)

## 2022-09-20 PROCEDURE — 6360000002 HC RX W HCPCS: Performed by: PHYSICAL MEDICINE & REHABILITATION

## 2022-09-20 PROCEDURE — 97116 GAIT TRAINING THERAPY: CPT

## 2022-09-20 PROCEDURE — 97110 THERAPEUTIC EXERCISES: CPT

## 2022-09-20 PROCEDURE — 85027 COMPLETE CBC AUTOMATED: CPT

## 2022-09-20 PROCEDURE — 6370000000 HC RX 637 (ALT 250 FOR IP): Performed by: INTERNAL MEDICINE

## 2022-09-20 PROCEDURE — 99232 SBSQ HOSP IP/OBS MODERATE 35: CPT | Performed by: INTERNAL MEDICINE

## 2022-09-20 PROCEDURE — 97530 THERAPEUTIC ACTIVITIES: CPT

## 2022-09-20 PROCEDURE — 36415 COLL VENOUS BLD VENIPUNCTURE: CPT

## 2022-09-20 PROCEDURE — 99231 SBSQ HOSP IP/OBS SF/LOW 25: CPT | Performed by: PHYSICAL MEDICINE & REHABILITATION

## 2022-09-20 PROCEDURE — 1180000000 HC REHAB R&B

## 2022-09-20 PROCEDURE — 6370000000 HC RX 637 (ALT 250 FOR IP): Performed by: PHYSICAL MEDICINE & REHABILITATION

## 2022-09-20 PROCEDURE — 80048 BASIC METABOLIC PNL TOTAL CA: CPT

## 2022-09-20 PROCEDURE — 97535 SELF CARE MNGMENT TRAINING: CPT

## 2022-09-20 RX ADMIN — AMLODIPINE BESYLATE 5 MG: 5 TABLET ORAL at 08:03

## 2022-09-20 RX ADMIN — OXYCODONE HYDROCHLORIDE AND ACETAMINOPHEN 1 TABLET: 5; 325 TABLET ORAL at 08:02

## 2022-09-20 RX ADMIN — LEVOTHYROXINE SODIUM 112 MCG: 112 TABLET ORAL at 05:45

## 2022-09-20 RX ADMIN — FLUOCINONIDE: 0.5 CREAM TOPICAL at 08:07

## 2022-09-20 RX ADMIN — OXYCODONE HYDROCHLORIDE AND ACETAMINOPHEN 1 TABLET: 5; 325 TABLET ORAL at 19:33

## 2022-09-20 RX ADMIN — POLYETHYLENE GLYCOL 3350 17 G: 17 POWDER, FOR SOLUTION ORAL at 08:01

## 2022-09-20 RX ADMIN — Medication 1 CAPSULE: at 12:06

## 2022-09-20 RX ADMIN — HYDROXYCHLOROQUINE SULFATE 200 MG: 200 TABLET ORAL at 08:08

## 2022-09-20 RX ADMIN — Medication 1 CAPSULE: at 17:48

## 2022-09-20 RX ADMIN — FLUOCINONIDE: 0.5 CREAM TOPICAL at 19:33

## 2022-09-20 RX ADMIN — ASPIRIN 81 MG 81 MG: 81 TABLET ORAL at 08:03

## 2022-09-20 RX ADMIN — Medication 1 CAPSULE: at 08:03

## 2022-09-20 RX ADMIN — ENOXAPARIN SODIUM 40 MG: 100 INJECTION SUBCUTANEOUS at 08:03

## 2022-09-20 RX ADMIN — OXYCODONE HYDROCHLORIDE AND ACETAMINOPHEN 1 TABLET: 5; 325 TABLET ORAL at 01:43

## 2022-09-20 RX ADMIN — Medication 1000 UNITS: at 08:03

## 2022-09-20 RX ADMIN — OXYCODONE HYDROCHLORIDE AND ACETAMINOPHEN 1 TABLET: 5; 325 TABLET ORAL at 23:44

## 2022-09-20 RX ADMIN — OXYCODONE HYDROCHLORIDE AND ACETAMINOPHEN 1 TABLET: 5; 325 TABLET ORAL at 15:22

## 2022-09-20 RX ADMIN — GUAIFENESIN 1200 MG: 600 TABLET, EXTENDED RELEASE ORAL at 08:03

## 2022-09-20 ASSESSMENT — PAIN - FUNCTIONAL ASSESSMENT
PAIN_FUNCTIONAL_ASSESSMENT: PREVENTS OR INTERFERES SOME ACTIVE ACTIVITIES AND ADLS
PAIN_FUNCTIONAL_ASSESSMENT: PREVENTS OR INTERFERES SOME ACTIVE ACTIVITIES AND ADLS
PAIN_FUNCTIONAL_ASSESSMENT: ACTIVITIES ARE NOT PREVENTED
PAIN_FUNCTIONAL_ASSESSMENT: PREVENTS OR INTERFERES SOME ACTIVE ACTIVITIES AND ADLS
PAIN_FUNCTIONAL_ASSESSMENT: PREVENTS OR INTERFERES SOME ACTIVE ACTIVITIES AND ADLS

## 2022-09-20 ASSESSMENT — PAIN DESCRIPTION - ONSET
ONSET: ON-GOING

## 2022-09-20 ASSESSMENT — PAIN SCALES - GENERAL
PAINLEVEL_OUTOF10: 8
PAINLEVEL_OUTOF10: 8
PAINLEVEL_OUTOF10: 7
PAINLEVEL_OUTOF10: 8
PAINLEVEL_OUTOF10: 9
PAINLEVEL_OUTOF10: 8
PAINLEVEL_OUTOF10: 8
PAINLEVEL_OUTOF10: 6
PAINLEVEL_OUTOF10: 6
PAINLEVEL_OUTOF10: 7

## 2022-09-20 ASSESSMENT — PAIN DESCRIPTION - DESCRIPTORS
DESCRIPTORS: ACHING
DESCRIPTORS: SHOOTING
DESCRIPTORS: ACHING

## 2022-09-20 ASSESSMENT — PAIN DESCRIPTION - ORIENTATION
ORIENTATION: RIGHT

## 2022-09-20 ASSESSMENT — PAIN DESCRIPTION - PAIN TYPE
TYPE: ACUTE PAIN
TYPE: SURGICAL PAIN
TYPE: ACUTE PAIN

## 2022-09-20 ASSESSMENT — PAIN DESCRIPTION - LOCATION
LOCATION: LEG;KNEE
LOCATION: OTHER (COMMENT)
LOCATION: LEG;HIP;KNEE
LOCATION: LEG;KNEE
LOCATION: HIP
LOCATION: HIP
LOCATION: LEG;KNEE

## 2022-09-20 ASSESSMENT — PAIN DESCRIPTION - FREQUENCY
FREQUENCY: CONTINUOUS

## 2022-09-20 ASSESSMENT — PAIN DESCRIPTION - DIRECTION: RADIATING_TOWARDS: RIGHT KNEE

## 2022-09-20 NOTE — PROGRESS NOTES
Physical Medicine & Rehabilitation  Progress Note      Subjective:      78year-old female with R hip fracture treated with IM nail. Patient is noting some mild numbness/tingling R leg which has been present with associated post-op swelling. She has some edema LLE as well. She has intermittent dizziness which is described as some light headedness and room spinning sensation. Orthostatic vitals were WNL and this is unchanged from pre-operative symptom status. ROS:  Denies fevers, chills, sweats. No chest pain, palpitations, lightheadedness. Denies coughing, wheezing or shortness of breath. Denies abdominal pain, nausea, diarrhea or constipation. No new areas of joint pain. Denies new areas of numbness or weakness. Denies new anxiety or depression issues. No new skin problems. Rehabilitation:   Progressing in therapies. PT:    Bed mobility  Bridging: Contact guard assistance  Rolling to Left: Minimal assistance  Rolling to Right: Minimal assistance  Supine to Sit: Contact guard assistance, Stand by assistance  Sit to Supine: Minimal assistance  Scooting: Minimal assistance  Bed Mobility Comments: patient prefers self assisting RLE into bed with UE support vs AD         Transfers  Sit to Stand: Contact guard assistance, Minimal Assistance  Stand to sit: Contact guard assistance  Stand Pivot Transfers: Contact guard assistance  Comment: Intermittently demo's posterior lean upon standing, LOB x 2 and reported dizziness and needed to sit to correct stability. TOILET transfer CGA; TOILETING CGA to steady pt;          Ambulation  Surface: level tile  Device: Rolling Walker  Other Apparatus:  (Writer Follows with W/C)  Assistance: Stand by assistance  Quality of Gait: Flexed posture, decreased STEVE, decreased step length  Gait Deviations: Slow Natalya, Decreased step length, Decreased step height  Distance: 98 feet, 137 feet; pm 207 feet pt reporting arm fatiguing and note pt with increased forward flex at hips and bearing increased weight support on walker. Comments: fatigue with gait distance  More Ambulation?: Yes  Ambulation 2  Surface - 2: level tile  Device 2: Rolling Walker  Assistance 2: Contact guard assistance  Quality of Gait 2: Antalgic, Slow but steady. Moderate BUE support on RW. Dec Heel contact/toe push-off bilaterally  Gait Deviations: Slow Natalya, Decreased step length, Decreased step height  Distance: 15ft x 2 in room, 70ft  Comments: Distance limited this session by sudden onset of dizziness. pt returned to sitting and reports dizziness subsided, RN notified       OT:  Grooming/Oral Hygiene  Assistance Level: Set-up  Skilled Clinical Factors: seated in w/c  Upper Extremity Bathing  Assistance Level: Set-up, Supervision  Skilled Clinical Factors: seated on shower bench  Lower Extremity Bathing  Equipment Provided: Long-handled sponge  Assistance Level: Supervision  Skilled Clinical Factors: Pt did not complete this date. Upper Extremity Dressing  Assistance Level: Set-up  Skilled Clinical Factors: donning/doffing [place] shirt while seated. Lower Extremity Dressing  Equipment Provided: Reachers  Assistance Level: Contact guard assist  Skilled Clinical Factors: threading pants with reacher, SBA-CGA in standing to pull over hips. Putting On/Taking Off Footwear  Equipment Provided: Reachers, Sock aid  Assistance Level: Minimal assistance  Skilled Clinical Factors: TA for TEDs, doffing footies with reacher, use of sock aid to donning footies  Toileting  Assistance Level: Stand by assist  Skilled Clinical Factors: seated lean for hygiene post BM.    Toilet Transfers  Technique:  (ambulating with RW)  Equipment: Grab bars, Standard toilet  Additional Factors: Verbal cues  Assistance Level: Contact guard assist  Skilled Clinical Factors: no LOB observed      SPEECH:      Objective:  BP (!) 148/76   Pulse 61   Temp 97.9 °F (36.6 °C)   Resp 16   Ht 5' 3\" (1.6 m)   Wt 125 lb 4 oz (56.8 kg)   SpO2 96% BMI 22.19 kg/m²       GEN: well developed, well nourished, NAD  HEENT: NCAT, PERRL, EOMI, mucous membranes pink and moist  CV: bradycardic rate regular rhythm, no murmurs, rubs or gallops  PULM: CTAB, no rales or rhonchi. Respirations WNL and unlabored  ABD: soft, NT, ND, BS+ and equal  NEURO: A&O x3. Sensation intact to light touch. MSK: Functional ROM BUEs and LLE. Weakness impairs ROM and strength RLE. Harshal Kiel Strength 5/5 key muscles BUEs and LLE. R hip flexion 3/5, R knee extension 4+/5. EXTREMITIES: No calf tenderness to palpation bilaterally. Post op edema proximal RLE. Non pitting edema LLE. SKIN: warm dry and intact with good turgor. R lateral hip incision well approximated with some serosanguinous drainage. PSYCH: appropriately interactive. Affect WNL. Diagnostics:     CBC: No results for input(s): WBC, RBC, HGB, HCT, MCV, RDW, PLT in the last 72 hours. BMP: No results for input(s): NA, K, CL, CO2, PHOS, BUN, CREATININE, CA, GLUCOSE in the last 72 hours. BNP: No results for input(s): BNP in the last 72 hours. PT/INR: No results for input(s): PROTIME, INR in the last 72 hours. APTT: No results for input(s): APTT in the last 72 hours. CARDIAC ENZYMES: No results for input(s): CKMB, CKMBINDEX, TROPONINT in the last 72 hours. Invalid input(s): CKTOTAL;3 troponins   FASTING LIPID PANEL:  Lab Results   Component Value Date    CHOL 142 10/21/2014    HDL 58 10/21/2014    TRIG 62 10/21/2014     LIVER PROFILE: No results for input(s): AST, ALT, ALB, BILIDIR, BILITOT, ALKPHOS in the last 72 hours.      Current Medications:   Current Facility-Administered Medications: oxyCODONE-acetaminophen (PERCOCET) 5-325 MG per tablet 1 tablet, 1 tablet, Oral, Q4H PRN  enoxaparin (LOVENOX) injection 40 mg, 40 mg, SubCUTAneous, Daily  amLODIPine (NORVASC) tablet 5 mg, 5 mg, Oral, Daily  acetaminophen (TYLENOL) tablet 650 mg, 650 mg, Oral, Q4H PRN  polyethylene glycol (GLYCOLAX) packet 17 g, 17 g, Oral, Daily  senna (SENOKOT) tablet 17.2 mg, 2 tablet, Oral, Daily PRN  bisacodyl (DULCOLAX) suppository 10 mg, 10 mg, Rectal, Daily PRN  lactobacillus (CULTURELLE) capsule 1 capsule, 1 capsule, Oral, TID WC  aspirin chewable tablet 81 mg, 81 mg, Oral, Daily  ascorbic acid (VITAMIN C) tablet 500 mg, 500 mg, Oral, Once per day on Mon Thu  Vitamin D (CHOLECALCIFEROL) tablet 1,000 Units, 1,000 Units, Oral, Daily  hydroxychloroquine (PLAQUENIL) tablet 200 mg, 200 mg, Oral, Daily  levothyroxine (SYNTHROID) tablet 112 mcg, 112 mcg, Oral, Daily  guaiFENesin (MUCINEX) extended release tablet 1,200 mg, 1,200 mg, Oral, Daily  nicotine (NICODERM CQ) 7 MG/24HR 1 patch, 1 patch, TransDERmal, Daily  promethazine (PHENERGAN) tablet 12.5 mg, 12.5 mg, Oral, Q8H PRN  calcium elemental (OSCAL) tablet 500 mg, 500 mg, Oral, Once per day on Mon Thu  fluocinonide (LIDEX) 0.05 % cream, , Topical, BID      Impression/Plan:   Impaired ADLs, gait, and mobility due to: Fall with R hip fracture: s/p IM nail. PT/OT  for gait, mobility, strengthening, endurance, ADLs, and self care. Has Percocet prn pain. WBAT. On Calcium/vitamin D for bone healing. UTI: completing Keflex 9/19/22. Blood loss anemia: Hb low but stable. Monitoring  HTN: controlled without medication. Having some orthostasis  Hypothyroidism: on levothyroxine  Nicotine dependence: on Nicoderm  Hyponatremia: Monitoring Na. Chronic sinusitis: on high dose Mucinex at home  CKD: Monitoring renal function  Lupus: on Plaquenil. Antiphospholipid antibody syndrome/Hx PE: has sissy filter. On Lovenox  Bowel Management: Miralax daily, Senokot prn, Dulcolax prn  Internal medicine for medical management  DVT prophylaxis:  on Lovenox for 4 weeks post-op.        Electronically signed by Carmen Jerry MD on 9/20/2022 at 9:27 AM      This note is created with the assistance of a speech recognition program.  While intending to generate a document that actually reflects the content of the visit, the document can still have some errors including those of syntax and sound a like substitutions which may escape proof reading. In such instances, actual meaning can be extrapolated by contextual diversion.

## 2022-09-20 NOTE — CARE COORDINATION
SW was informed by Dyan Hurley RN that pt wanted to speak to [address] [facility] spoke to pt and she would like to go ahead with a VNS that will work with her insurance. BORA informed her of some choices and she agreed to sending referral to [address] [place] [address].

## 2022-09-20 NOTE — PROGRESS NOTES
Physical Therapy  Facility/Department: [facility] ACUTE REHAB  Rehabilitation Physical Therapy   NAME: Kvng Dempsey  : 1945 (78 y.o.)  MRN: [id number]  CODE STATUS: Full Code    Date of Service: 22      Past Medical History:   Diagnosis Date    CKD (chronic kidney disease) stage 3, GFR 30-59 ml/min     Dehydration     Dysphagia     GERD (gastroesophageal reflux disease)     Abelardo filter in place     History of DVT (deep vein thrombosis)     Hx: UTI (urinary tract infection)     Hypertension     Hyperthyroidism     XRT    Hyperthyroidism     Hypothyroidism     due to Rx for Graves disease    Iron deficiency anemia     Lupus (HCC)     Metabolic acidosis     Pharyngitis     Pulmonary embolism (HCC)     Pyelonephritis     Pyelonephritis     Renal abscess     Right kidney mass     Seizures ([address]     SLE ([facility] encephalitis)      Past Surgical History:   Procedure Laterality Date    APPENDECTOMY      BUNIONECTOMY      BUNIONECTOMY      COLONOSCOPY      HYSTERECTOMY      REVISION COLOSTOMY      VENA CAVA FILTER PLACEMENT         Chart Reviewed: Yes  Patient assessed for rehabilitation services?: Yes  Additional Pertinent Hx: The patient is a 78 y.o. female, S/P mechanical fall at home with subsequent sustained R femur fracture, S/P Intramedullary trochanteric fixation nail to R Hip Performed by Dr. Daphney Gates on 22  Family / Caregiver Present: No  Referring Practitioner: Dr. Lovetta Alpers  Referral Date : 09/15/22  Diagnosis: Closed R Hip Fracture; S/P R IM nailing 22    Restrictions:  Restrictions/Precautions: Weight Bearing; Fall Risk;General Precautions; Up as Tolerated  Lower Extremity Weight Bearing Restrictions  Right Lower Extremity Weight Bearing: Weight Bearing As Tolerated (FWBAT)  Position Activity Restriction  Other position/activity restrictions: RLE FWBAT     SUBJECTIVE   No new complaints. noted legs swollen today not new swelling.         Functional Mobility  Bed mobility  Bridging: Contact guard assistance  Rolling to Left: Minimal assistance  Rolling to Right: Minimal assistance  Supine to Sit: Contact guard assistance;Stand by assistance  Sit to Supine: Minimal assistance  Scooting: Minimal assistance  Bed Mobility Comments: patient prefers self assisting RLE into bed with UE support vs AD  Transfers  Sit to Stand: Contact guard assistance;Minimal Assistance  Stand to sit: Contact guard assistance  Stand Pivot Transfers: Contact guard assistance  Comment: Intermittently demo's posterior lean upon standing, LOB x 2 and reported dizziness and needed to sit to correct stability. TOILET transfer CGA; TOILETING CGA to steady pt;    Environmental Mobility  Ambulation  Surface: level tile  Device: Rolling Walker  Other Apparatus:  (Writer Follows with W/C)  Assistance: Stand by assistance  Quality of Gait: Flexed posture, decreased STEVE, decreased step length  Gait Deviations: Slow Natalya;Decreased step length;Decreased step height  Distance: 98 feet, 137 feet; pm 207 feet pt reporting arm fatiguing and note pt with increased forward flex at hips and bearing increased weight support on walker. Comments: fatigue with gait distance  More Ambulation?: Yes  Stairs/Curb  Stairs?: Yes  Stairs  # Steps : 6  Stairs Height: 6\"  Rails: Right ascending  Curbs: 6\"  Assistance: Contact guard assistance  Comment: With Cues/Edu/Demo pt demos good carry over sequence and stepping pattern to maximize safety    PT Exercises  PROM Exercises: BLE gastroc stretches 30\"x 3  A/AROM Exercises: Supine RLE A/AAROM 10 x 2;,Occassional assistance needed;  Standing BLE AROM in // bars x 10 including heel raises, marches, HS curls. Limited ROM noted on RLE  Circulation/Endurance Exercises: Seated marching, kicks, ankle pumps 20 reps  Static Sitting Balance Exercises: Standing balloon tap x 1 min, no LOB; Static standing with single UE support reaching STEVE and crossing midline x 1min 35sec, no LOB;  Standing balance in restroom w/o UE support CGA to aide/doff pants for toileting, no LOB  Standing Open/Closed Kinetic Chain Exercises: //bar support BUE 20 reps  Exercise Equipment: NuStep 15 minutes am and pm L4    ASSESSMENT       Activity Tolerance  Activity Tolerance: Patient tolerated treatment well    GOALS  Patient Goals   Patient goals : To walk better  Short Term Goals  Time Frame for Short term goals: 5 days  Short term goal 1: Pt to demo All bed mobility with CGA using Gait belt as leglift assist as needed  Short term goal 2: pt to demo all transfers with RW and SBA  Short term goal 3: pt to ambulate 150' with RW and SBA  Short term goal 4: pt to negotiate 5 steps with rails and AD as needed to allow for safe home access. Short term goal 5: pt to identify effective methods for reducing pain and swelling (including proper LE Elevation techinque/Icing Technique)  Additional Goals?: Yes  Short Term Goal 6: Pt to demonstrate good technique for HEP exercises to improve pt's independence with self-care exercise routine  Long Term Goals  Time Frame for Long term goals : Until D/C  Long term goal 1: Pt to demo All bed mobility independently using Gait belt as leglift assist as needed  Long term goal 2: pt to demo all transfers with RW Mod I  Long term goal 3: pt to ambulate 150' with RW Mod I on level surfaces, 10-30' ramped surface RW Supervision, and Outdoor terrain with RW and SBA  Long term goal 4: pt to negotiate 5 steps with rails, SBA, and AD as needed to allow for safe home access. Long term goal 5: pt to demo simulated or actual car transfer with safe technique, appropriate device, and SBA  Additional Goals?: Yes  Long term goal 6: Pt to Improve Distance on 2MWT By 113' to demo Minimal Detectable Change in walking speed. Goal is to walk 198-200' in 2 Minutes.     PLAN OF CARE  Frequency: 1-2 treatment sessions per day, 5-7 days per week       EDUCATION  Education  Education Given To: Patient  Education Provided:  Mobility Training;Transfer Training  Education Provided Comments: transfers and therex  Education Method: Verbal  Barriers to Learning: None  Education Outcome: Verbalized understanding           Therapy Time   09/20/22 1027 09/20/22 1340   PT Individual Minutes   Time In 0905 1300   Time Out 1002 1340   Minutes 57 220 Katharine Delvalle PTA, 09/20/22 at 2:45 PM

## 2022-09-20 NOTE — PROGRESS NOTES
Physical Therapy  Facility/Department: [facility] ACUTE REHAB  Rehabilitation Physical Therapy   NAME: Chasity Townsend  : 1945 (78 y.o.)  MRN: [id number]  CODE STATUS: Full Code    Date of Service: 22      Past Medical History:   Diagnosis Date    CKD (chronic kidney disease) stage 3, GFR 30-59 ml/min     Dehydration     Dysphagia     GERD (gastroesophageal reflux disease)     Abelardo filter in place     History of DVT (deep vein thrombosis)     Hx: UTI (urinary tract infection)     Hypertension     Hyperthyroidism     XRT    Hyperthyroidism     Hypothyroidism     due to Rx for Graves disease    Iron deficiency anemia     Lupus (HCC)     Metabolic acidosis     Pharyngitis     Pulmonary embolism (HCC)     Pyelonephritis     Pyelonephritis     Renal abscess     Right kidney mass     Seizures ([address]     SLE ([place] encephalitis)      Past Surgical History:   Procedure Laterality Date    APPENDECTOMY      BUNIONECTOMY      BUNIONECTOMY      COLONOSCOPY      HYSTERECTOMY      REVISION COLOSTOMY      VENA CAVA FILTER PLACEMENT         Chart Reviewed: Yes  Patient assessed for rehabilitation services?: Yes  Additional Pertinent Hx: The patient is a 78 y.o. female, S/P mechanical fall at home with subsequent sustained R femur fracture, S/P Intramedullary trochanteric fixation nail to R Hip Performed by Dr. Ina Waldrop on 22  Family / Caregiver Present: No  Referring Practitioner: Dr. Alberto Zee  Referral Date : 09/15/22  Diagnosis: Closed R Hip Fracture; S/P R IM nailing 22    Restrictions:  Restrictions/Precautions: Weight Bearing; Fall Risk;General Precautions; Up as Tolerated  Lower Extremity Weight Bearing Restrictions  Right Lower Extremity Weight Bearing: Weight Bearing As Tolerated (FWBAT)  Position Activity Restriction  Other position/activity restrictions: RLE FWBAT     SUBJECTIVE  Subjective: patient arrived to therapy gym via wc. No new complaints.  states had a intense dream last night where she was screaming out and Nursing assisted her. pt also reporting LLE numbness knee down today. noted legs swollen today not new swelling. BLE skin inspected and TEDs checked for tightness. both within normal expectations. pm same complaints of leg numbness. Recommended patient discuss with MD. Notified RN Bienvenido       Post Treatment Pain Screening       Prior Level of Function:         OBJECTIVE                 ROM       Strength       Quality of Movement            Functional Mobility  Bed mobility  Bridging: Contact guard assistance  Rolling to Left: Minimal assistance  Rolling to Right: Minimal assistance  Supine to Sit: Contact guard assistance;Stand by assistance  Sit to Supine: Minimal assistance  Scooting: Minimal assistance  Bed Mobility Comments: patient prefers self assisting RLE into bed with UE support vs AD  Transfers  Sit to Stand: Contact guard assistance;Minimal Assistance  Stand to sit: Contact guard assistance  Stand Pivot Transfers: Contact guard assistance  Comment: Intermittently demo's posterior lean upon standing, LOB x 2 and reported dizziness and needed to sit to correct stability. TOILET transfer CGA; TOILETING CGA to steady pt;    Environmental Mobility  Ambulation  Surface: level tile  Device: Rolling Walker  Other Apparatus:  (Writer Follows with W/C)  Assistance: Stand by assistance  Quality of Gait: Flexed posture, decreased STEVE, decreased step length  Gait Deviations: Slow Natalya;Decreased step length;Decreased step height  Distance: 98 feet, 137 feet; pm 207 feet pt reporting arm fatiguing and note pt with increased forward flex at hips and bearing increased weight support on walker.   Comments: fatigue with gait distance  More Ambulation?: Yes  Stairs/Curb  Stairs?: Yes  Stairs  # Steps : 6  Stairs Height: 6\"  Rails: Right ascending  Curbs: 6\"  Assistance: Contact guard assistance  Comment: With Cues/Edu/Demo pt demos good carry over sequence and stepping pattern to maximize safety    PT Exercises  PROM Exercises: BLE gastroc stretches 30\"x 3  A/AROM Exercises: Supine RLE A/AAROM 10 x 2;,Occassional assistance needed;  Standing BLE AROM in // bars x 10 including heel raises, marches, HS curls. Limited ROM noted on RLE  Circulation/Endurance Exercises: Seated marching, kicks, ankle pumps 20 reps  Static Sitting Balance Exercises: Standing balloon tap x 1 min, no LOB; Static standing with single UE support reaching STEVE and crossing midline x 1min 35sec, no LOB; Standing balance in restroom w/o UE support CGA to aide/doff pants for toileting, no LOB  Standing Open/Closed Kinetic Chain Exercises: //bar support BUE 20 reps  Exercise Equipment: NuStep 15 minutes am and pm L4    ASSESSMENT       Activity Tolerance  Activity Tolerance: Patient tolerated treatment well         CLINICAL IMPRESSION        GOALS  Patient Goals   Patient goals : To walk better  Short Term Goals  Time Frame for Short term goals: 5 days  Short term goal 1: Pt to demo All bed mobility with CGA using Gait belt as leglift assist as needed  Short term goal 2: pt to demo all transfers with RW and SBA  Short term goal 3: pt to ambulate 150' with RW and SBA  Short term goal 4: pt to negotiate 5 steps with rails and AD as needed to allow for safe home access.   Short term goal 5: pt to identify effective methods for reducing pain and swelling (including proper LE Elevation techinque/Icing Technique)  Additional Goals?: Yes  Short Term Goal 6: Pt to demonstrate good technique for HEP exercises to improve pt's independence with self-care exercise routine  Long Term Goals  Time Frame for Long term goals : Until D/C  Long term goal 1: Pt to demo All bed mobility independently using Gait belt as leglift assist as needed  Long term goal 2: pt to demo all transfers with RW Mod I  Long term goal 3: pt to ambulate 150' with RW Mod I on level surfaces, 10-30' ramped surface RW Supervision, and Outdoor terrain with RW and SBA  Long term goal 4: pt to negotiate 5 steps with rails, SBA, and AD as needed to allow for safe home access. Long term goal 5: pt to demo simulated or actual car transfer with safe technique, appropriate device, and SBA  Additional Goals?: Yes  Long term goal 6: Pt to Improve Distance on 2MWT By 113' to demo Minimal Detectable Change in walking speed. Goal is to walk 198-200' in 2 Minutes. PLAN OF CARE  Frequency: 1-2 treatment sessions per day, 5-7 days per week       EDUCATION  Education  Education Given To: Patient  Education Provided:  Mobility Training;Transfer Training  Education Provided Comments: transfers and therex  Education Method: Verbal  Barriers to Learning: None  Education Outcome: Verbalized understanding    ELOS:          Therapy Time   Individual Concurrent Group Co-treatment   Time In 1300         Time Out 1340         Minutes Milly Nova PTA, 09/20/22 at 2:45 PM

## 2022-09-20 NOTE — PROGRESS NOTES
Occupational Therapy  Facility/Department: [facility] ACUTE REHAB  Rehabilitation Occupational Therapy Daily Treatment Note    Date: 22  Patient Name: Blanca Ling       Room: 0659/5914-83  MRN: [id number]  Account: [de-identified]   : 1945  (71 y.o.) Gender: female             Past Medical History:  has a past medical history of CKD (chronic kidney disease) stage 3, GFR 30-59 ml/min, Dehydration, Dysphagia, GERD (gastroesophageal reflux disease), Abelardo filter in place, History of DVT (deep vein thrombosis), Hx: UTI (urinary tract infection), Hypertension, Hyperthyroidism, Hyperthyroidism, Hypothyroidism, Iron deficiency anemia, Lupus ([address] Metabolic acidosis, Pharyngitis, Pulmonary embolism ([address] Pyelonephritis, Pyelonephritis, Renal abscess, Right kidney mass, Seizures ([address] and SLE ([facility] encephalitis). Past Surgical History:   has a past surgical history that includes Vena Cava Filter Placement; Hysterectomy; Bunionectomy; Appendectomy; Colonoscopy; Bunionectomy; and Revision Colostomy. Restrictions  Restrictions/Precautions: Weight Bearing; Fall Risk;General Precautions; Up as Tolerated  Implants present? : Metal implants (R ERNESTO)  Other position/activity restrictions: RLE FWBAT  Right Lower Extremity Weight Bearing: Weight Bearing As Tolerated (FWBAT)  Required Braces or Orthoses?: No    Subjective  Subjective: AM: pt is motivated to get a shower this morning. PM: \"My leg is killing me. \" pt states  Pain Level: 8  Pain Location: Leg;Knee  Pain Orientation: Right  Restrictions/Precautions: Weight Bearing; Fall Risk;General Precautions; Up as Tolerated        Pain Assessment  Pain Assessment: 0-10  Pain Level: 8  Pain Location: Leg, Knee  Pain Orientation: Right  Pain Descriptors: Aching  Functional Pain Assessment: Prevents or interferes some active activities and ADLs  Response to Pain Intervention: Patient satisfied    Objective     Cognition  Overall Cognitive Status: WFL  Orientation  Overall Orientation Status: Within Functional Limits  Orientation Level: Oriented X4         ADL    Feeding  Assistance Level: Independent  Skilled Clinical Factors: per pt report    Grooming/Oral Hygiene  Assistance Level: Set-up  Skilled Clinical Factors: seated in w/c    Upper Extremity Bathing  Assistance Level: Set-up; Supervision  Skilled Clinical Factors: seated on shower bench    Lower Extremity Bathing  Equipment Provided: Long-handled sponge  Assistance Level: Supervision    Upper Extremity Dressing  Assistance Level: Set-up  Skilled Clinical Factors: donning/doffing [place] shirt while seated. Lower Extremity Dressing  Equipment Provided: Reachers  Assistance Level: Contact guard assist  Skilled Clinical Factors: threading pants with reacher, SBA-CGA in standing to pull over hips. Putting On/Taking Off Footwear  Equipment Provided: Reachers;Sock aid  Assistance Level: Minimal assistance  Skilled Clinical Factors: TA for TEDs, doffing footies with reacher, use of sock aid to donning footies    Toileting  Assistance Level: Stand by assist  Skilled Clinical Factors: seated lean for hygiene post BM. Toilet Transfers  Equipment: Grab bars;Standard toilet  Additional Factors: Verbal cues  Assistance Level: Contact guard assist  Skilled Clinical Factors: no LOB observed    Tub/Shower Transfers  Type: Shower  Transfer From: Rolling walker  Transfer To: Tub transfer bench  Additional Factors: Verbal cues;Cues for hand placement; With handrails  Assistance Level: Contact guard assist  Skilled Clinical Factors: CGA for safety due to unsteady gait. Functional Mobility  Device: Wheelchair  Activity: To/From therapy gym  Assistance Level: Supervision        OT Exercises  Resistive Exercises: PM: pt tolerated 4 min of arm bike exercises to increase BUE strength, endurance, and ROM to perform functional tasks of daily living. pt completed exercises without rest breaks.   Motor Control/Coordination: PM: pt engaged in fine motor activity while seated in a w/c at table top matching colored blocks in slots following a pattern. pt req 2 v/c to orient the blocks correctly. Assessment  Assessment  Activity Tolerance: Patient tolerated treatment well  Discharge Recommendations: Home with assist PRN;Patient would benefit from continued therapy after discharge  OT Equipment Recommendations  ADL Assistive Devices: Shower Chair with back; Reacher;Sock-Aid Hard;Long-handled Sponge  Other: TBD  Safety Devices  Safety Devices in place: Yes  Type of devices: Left in chair;Gait belt    Patient Education  Education  Education Given To: Patient  Education Provided: Role of Therapy;Plan of Care;ADL Function;Transfer Training  Education Method: Verbal;Demonstration  Barriers to Learning: None  Education Outcome: Verbalized understanding;Demonstrated understanding    Plan  Plan  Times per Week: 5-7  Times per Day: Twice a day  Current Treatment Recommendations: Strengthening;ROM;Balance training;Functional mobility training; Endurance training;Pain management; Safety education & training;Patient/Caregiver education & training;Equipment evaluation, education, & procurement;Self-Care / ADL;Return to work related activity; Coordination training    Goals  Patient Goals   Patient goals : \"To get as much accomplished as I can and to come out stronger to keep up with my jobs, my grandchildren, and the little ones\"  Short Term Goals  Time Frame for Short term goals: One week  Short Term Goal 1: Pt will complete LB dressing/bathing/toileting tasks with CGA and Good safety to improve IND and safety with self care tasks. Short Term Goal 2: Pt will complete UB dressing/bathing tasks with supervision and Good safety to improve IND and safety with self care tasks. Short Term Goal 3: Pt will complete functional transfers/mobility with CGA and Good safety to improve IND and safety with self care and mobility tasks.   Short Term Goal 4: Pt will tolerate standing for 6+ minutes during functional activity of choice with CGA and Good safety to improve IND and safety with self care and mobility tasks. Short Term Goal 5: Pt will verbalize/demonstrate Good understanding of at least 3 AD/DME/modified techniques to improve IND and safety with self care and mobility tasks. Additional Goals?: Yes  Short Term Goal 6: OTR/EM to further assess FMC/ strength. Short Term Goal 7: Pt will participate in 30+ minutes of therapeutic exercises/functional activities to improve IND and safety with self care and mobility tasks. Long Term Goals  Time Frame for Long term goals : By discharge  Long Term Goal 1: Pt will complete BADLs with modified IND and Good safety  to improve IND and safety with self care tasks. Long Term Goal 2: Pt will complete functional transfers/mobility with modified IND and Good safety to improve IND and safety with self care and mobility tasks. Long Term Goal 3: Pt will tolerate standing for 12+ minutes during functional activity of choice with modified IND and Good safety to improve IND and safety with self care and mobility tasks. Long Term Goal 4: Pt will verbalize/demonstrate Good understanding of at least 3 fall prevention/home safety strategies to improve IND and safety with self care and mobility tasks. Long Term Goal 5: Pt will complete medication management task with 100% accuracy with use of adaptive strategies to improve safety. Additional Goals?: Yes  Long Term Goal 6: Pt will complete light housekeeping/meal preparation task with supervision and Good safety to improve IND and safety with ADL/IADL tasks.        09/20/22 0809 09/20/22 1604   OT Individual Minutes   Time In 0809 1403   Time Out 0905 1441   Minutes [facility]

## 2022-09-20 NOTE — PROGRESS NOTES
[facility] Internal Medicine    Progress note            Date:   9/18/2022  Patient name:  Charles Resendiz  Date of admission:  9/15/2022  2:31 PM  MRN:   [id number]  Account:  [de-identified]  YOB: 1945  PCP:    Saint Iha, DO  Room:   2617/2617-01  Code Status:    Full Code    Physician Requesting Consult: Samuel Mckinnon MD    Reason for Consult:  Medical management    Chief Complaint:     No chief complaint on file. Debility secondary to right hip surgery    History Obtained From:     Patient, EMR, nursing staff    History of Present Illness:     70-year-old female admitted from outside hospital after ORIF for right hip fracture performed on 9/9.   Course complicated by anemia hemoglobin to 5.9 status post transfusion medical history significant for Sjogren's for which she is taking Plaquenil, CKD stage III antiphospholipid antibody syndrome, history of PE patient has IVC filter not on any AC      Past Medical History:     Past Medical History:   Diagnosis Date    CKD (chronic kidney disease) stage 3, GFR 30-59 ml/min     Dehydration     Dysphagia     GERD (gastroesophageal reflux disease)     Abelardo filter in place     History of DVT (deep vein thrombosis)     Hx: UTI (urinary tract infection)     Hypertension     Hyperthyroidism 1995    XRT    Hyperthyroidism     Hypothyroidism     due to Rx for Graves disease    Iron deficiency anemia     Lupus (HCC)     Metabolic acidosis     Pharyngitis     Pulmonary embolism (HCC)     Pyelonephritis     Pyelonephritis     Renal abscess     Right kidney mass     Seizures [address] [address]     SLE ([facility] encephalitis)         Past Surgical History:     Past Surgical History:   Procedure Laterality Date    APPENDECTOMY      BUNIONECTOMY      BUNIONECTOMY      COLONOSCOPY      HYSTERECTOMY      REVISION COLOSTOMY      VENA CAVA FILTER PLACEMENT          Medications Prior to Admission:     Prior to Admission medications Medication Sig Start Date End Date Taking? Authorizing Provider   calcium carbonate 600 MG TABS tablet Take 650 mg by mouth Twice a Week   Yes Historical Provider, MD   vitamin D (CHOLECALCIFEROL) 25 MCG (1000 UT) TABS tablet Take 1,000 Units by mouth daily   Yes Historical Provider, MD   nicotine (NICODERM CQ) 7 MG/24HR Place 1 patch onto the skin every 24 hours   Yes Historical Provider, MD   LACTOBACILLUS ACID-PECTIN PO Take 100 mg by mouth 3 times daily (with meals)   Yes Historical Provider, MD   cephALEXin (KEFLEX) 500 MG capsule Take 500 mg by mouth in the morning and at bedtime For 4 days   Yes Historical Provider, MD   dextromethorphan-guaiFENesin (MUCINEX DM)  MG per extended release tablet Take 2 tablets by mouth daily Reports drainage from her sinuses   Yes Historical Provider, MD   promethazine (PHENERGAN) 12.5 MG tablet Take 12.5 mg by mouth every 8 hours as needed for Nausea   Yes Historical Provider, MD   triamcinolone (KENALOG) 0.025 % ointment Apply 1 Applicatorful topically 2 times daily Apply topically 2 times daily. Yes Historical Provider, MD   hydroxychloroquine (PLAQUENIL) 200 MG tablet Take by mouth daily    Historical Provider, MD   aspirin (ASPIR-LOW) 81 MG EC tablet Take 1 tablet by mouth daily. 10/22/14   Collette Moose, DPM   levothyroxine (LEVOTHROID) 112 MCG tablet Take 1 tablet by mouth Daily. 10/22/14   Yann Lazaro DO   b complex vitamins capsule Take 1 capsule by mouth Twice a Week    Historical Provider, MD        Allergies:     Ertapenem, Codeine, and Nubain [nalbuphine hcl]    Social History:     Tobacco:    reports that she quit smoking about 12 years ago. She has a 18.50 pack-year smoking history. She has never used smokeless tobacco.  Alcohol:      reports current alcohol use. Drug Use:  reports no history of drug use.     Family History:     Family History   Problem Relation Age of Onset    High Blood Pressure Mother     Stroke Mother     Substance Abuse Mother     Cancer Father     Heart Disease Father     High Blood Pressure Sister     Substance Abuse Brother     Stroke Maternal Grandmother     Stroke Paternal Grandfather        Review of Systems:     Positive and Negative as described in HPI. CONSTITUTIONAL:  negative for fevers, chills, sweats, fatigue, weight loss  HEENT:  negative for vision, hearing changes, runny nose, throat pain  RESPIRATORY:  negative for shortness of breath, cough, congestion, wheezing. CARDIOVASCULAR:  negative for chest pain, palpitations. GASTROINTESTINAL:  negative for nausea, vomiting, diarrhea, constipation, change in bowel habits, abdominal pain   GENITOURINARY:  negative for difficulty of urination, burning with urination, frequency   INTEGUMENT:  negative for rash, skin lesions, easy bruising   HEMATOLOGIC/LYMPHATIC:  negative for swelling/edema   ALLERGIC/IMMUNOLOGIC:  negative for urticaria , itching  ENDOCRINE:  negative increase in drinking, increase in urination, hot or cold intolerance  MUSCULOSKELETAL:  post op right  hip pain, negative joint pains, muscle aches, swelling of joints  NEUROLOGICAL:  negative for headaches, dizziness, lightheadedness, numbness, pain, tingling extremities      Physical Exam:     BP (!) 148/76   Pulse 61   Temp 97.9 °F (36.6 °C)   Resp 16   Ht 5' 3\" (1.6 m)   Wt 125 lb 4 oz (56.8 kg)   SpO2 96%   BMI 22.19 kg/m²   Temp (24hrs), Av.9 °F (36.6 °C), Min:97.9 °F (36.6 °C), Max:97.9 °F (36.6 °C)        General Appearance:  alert, well appearing, and in no acute distress  Head:  normocephalic, atraumatic.   Eye: no icterus, redness, pupils equal and reactive, extraocular eye movements intact, conjunctiva clear  Ear: normal external ear, no discharge, hearing intact  Nose:  no drainage noted  Mouth: mucous membranes moist  Neck: supple, no carotid bruits, thyroid not palpable  Lungs: Bilateral equal air entry, clear to ausculation, no wheezing, rales or rhonchi, normal effort  Cardiovascular: normal rate, regular rhythm, no murmur, gallop, rub. Abdomen: Soft, nontender, nondistended, normal bowel sounds, no hepatomegaly or splenomegaly  Neurologic: There are no new focal motor or sensory deficits, normal muscle tone and bulk, no abnormal sensation, normal speech, cranial nerves II through XII grossly intact  Skin: No gross lesions, rashes, bruising or bleeding on exposed skin area  Extremities: right hip pain and restriction ROM,  peripheral pulses palpable, no pedal edema or calf pain with palpation  Psych: normal affect    Investigations:      Laboratory Testing:  No results found for this or any previous visit (from the past 24 hour(s)). Imaging/Diagonstics:  Recent data reviewed    Assessment :      Primary Problem  Fracture of hip, closed, right, sequela    Active Hospital Problems    Diagnosis Date Noted    Fracture of hip, closed, right, sequela [S72.001S] 09/15/2022     Priority: Medium       Plan:     Debility secondary to right hip surgery ORIF for fracture-continue with physical therapy  Sjogren syndrome-continue with Plaquenil  CKD stage III-creatinine monitoring, 1.02 on admission  Acute blood loss anemia noted postop-will monitor hemoglobin. 9.0 today on admission  Hypothyroid-resume Synthroid  UTI-complete course of Keflex  Mild hyponatremia-monitor sodium; 131 on admission  History of PE, APL antibody-IVC filter in situ    DVT prophylaxis-mechanical only      9/20  Patient seen and examined, participating in physical therapy no new issues  Continue current management  Will repeat labs to monitor sodium, hemoglobin        Consultations:   Dana Pope MD  9/20/2022  11:33 AM    Copy sent to Dr. Bennett Citizen, DO    Please note that this chart was generated using voice recognition Dragon dictation software.   Although every effort was made to ensure the accuracy of

## 2022-09-21 ENCOUNTER — APPOINTMENT (OUTPATIENT)
Dept: VASCULAR LAB | Age: 77
DRG: 560 | End: 2022-09-21
Attending: PHYSICAL MEDICINE & REHABILITATION
Payer: MEDICARE

## 2022-09-21 PROCEDURE — 99232 SBSQ HOSP IP/OBS MODERATE 35: CPT | Performed by: INTERNAL MEDICINE

## 2022-09-21 PROCEDURE — 6370000000 HC RX 637 (ALT 250 FOR IP): Performed by: PHYSICAL MEDICINE & REHABILITATION

## 2022-09-21 PROCEDURE — 1180000000 HC REHAB R&B

## 2022-09-21 PROCEDURE — 6370000000 HC RX 637 (ALT 250 FOR IP): Performed by: INTERNAL MEDICINE

## 2022-09-21 PROCEDURE — 99232 SBSQ HOSP IP/OBS MODERATE 35: CPT | Performed by: PHYSICAL MEDICINE & REHABILITATION

## 2022-09-21 PROCEDURE — 6360000002 HC RX W HCPCS: Performed by: PHYSICAL MEDICINE & REHABILITATION

## 2022-09-21 PROCEDURE — 93970 EXTREMITY STUDY: CPT

## 2022-09-21 PROCEDURE — 97116 GAIT TRAINING THERAPY: CPT

## 2022-09-21 PROCEDURE — 97110 THERAPEUTIC EXERCISES: CPT

## 2022-09-21 PROCEDURE — 97535 SELF CARE MNGMENT TRAINING: CPT

## 2022-09-21 PROCEDURE — 97530 THERAPEUTIC ACTIVITIES: CPT

## 2022-09-21 RX ORDER — OXYCODONE HYDROCHLORIDE AND ACETAMINOPHEN 5; 325 MG/1; MG/1
1 TABLET ORAL EVERY 4 HOURS PRN
Status: DISCONTINUED | OUTPATIENT
Start: 2022-09-21 | End: 2022-09-23 | Stop reason: HOSPADM

## 2022-09-21 RX ADMIN — ASPIRIN 81 MG 81 MG: 81 TABLET ORAL at 08:20

## 2022-09-21 RX ADMIN — Medication 1 CAPSULE: at 13:00

## 2022-09-21 RX ADMIN — LEVOTHYROXINE SODIUM 112 MCG: 112 TABLET ORAL at 05:25

## 2022-09-21 RX ADMIN — GUAIFENESIN 1200 MG: 600 TABLET, EXTENDED RELEASE ORAL at 08:20

## 2022-09-21 RX ADMIN — OXYCODONE HYDROCHLORIDE AND ACETAMINOPHEN 1 TABLET: 5; 325 TABLET ORAL at 22:08

## 2022-09-21 RX ADMIN — Medication 1 CAPSULE: at 08:20

## 2022-09-21 RX ADMIN — OXYCODONE HYDROCHLORIDE AND ACETAMINOPHEN 1 TABLET: 5; 325 TABLET ORAL at 08:20

## 2022-09-21 RX ADMIN — OXYCODONE HYDROCHLORIDE AND ACETAMINOPHEN 1 TABLET: 5; 325 TABLET ORAL at 18:34

## 2022-09-21 RX ADMIN — Medication 1000 UNITS: at 08:20

## 2022-09-21 RX ADMIN — FLUOCINONIDE: 0.5 CREAM TOPICAL at 08:23

## 2022-09-21 RX ADMIN — OXYCODONE HYDROCHLORIDE AND ACETAMINOPHEN 1 TABLET: 5; 325 TABLET ORAL at 13:00

## 2022-09-21 RX ADMIN — AMLODIPINE BESYLATE 5 MG: 5 TABLET ORAL at 08:20

## 2022-09-21 RX ADMIN — HYDROXYCHLOROQUINE SULFATE 200 MG: 200 TABLET ORAL at 08:22

## 2022-09-21 RX ADMIN — ENOXAPARIN SODIUM 40 MG: 100 INJECTION SUBCUTANEOUS at 08:21

## 2022-09-21 RX ADMIN — Medication 1 CAPSULE: at 16:29

## 2022-09-21 ASSESSMENT — PAIN SCALES - GENERAL
PAINLEVEL_OUTOF10: 6
PAINLEVEL_OUTOF10: 8
PAINLEVEL_OUTOF10: 7
PAINLEVEL_OUTOF10: 8
PAINLEVEL_OUTOF10: 8

## 2022-09-21 ASSESSMENT — PAIN DESCRIPTION - ORIENTATION: ORIENTATION: RIGHT

## 2022-09-21 ASSESSMENT — PAIN SCALES - WONG BAKER: WONGBAKER_NUMERICALRESPONSE: 2

## 2022-09-21 ASSESSMENT — PAIN DESCRIPTION - LOCATION: LOCATION: LEG

## 2022-09-21 NOTE — PROGRESS NOTES
[address] [facility]: Contact guard assistance  Quality of Gait 2: Antalgic, Slow but steady. Moderate BUE support on RW. Dec Heel contact/toe push-off bilaterally  Gait Deviations: Slow Natalya, Decreased step length, Decreased step height  Distance: 15ft x 2 in room, 70ft  Comments: Distance limited this session by sudden onset of dizziness. pt returned to sitting and reports dizziness subsided, RN notified       OT:  Grooming/Oral Hygiene  Assistance Level: Set-up  Skilled Clinical Factors: seated in w/c  Upper Extremity Bathing  Assistance Level: Set-up, Supervision  Skilled Clinical Factors: seated on shower bench  Lower Extremity Bathing  Equipment Provided: Long-handled sponge  Assistance Level: Supervision  Skilled Clinical Factors: Pt did not complete this date. Upper Extremity Dressing  Assistance Level: Set-up  Skilled Clinical Factors: donning/doffing OH shirt while seated. Lower Extremity Dressing  Equipment Provided: Reachers  Assistance Level: Contact guard assist  Skilled Clinical Factors: threading pants with reacher, SBA-CGA in standing to pull over hips. Putting On/Taking Off Footwear  Equipment Provided: Reachers, Sock aid  Assistance Level: Minimal assistance  Skilled Clinical Factors: TA for TEDs, doffing footies with reacher, use of sock aid to donning footies  Toileting  Assistance Level: Stand by assist  Skilled Clinical Factors: seated lean for hygiene post BM.    Toilet Transfers  Technique:  (ambulating with RW)  Equipment: Grab bars, Standard toilet  Additional Factors: Verbal cues  Assistance Level: Contact guard assist  Skilled Clinical Factors: no LOB observed      SPEECH:      Objective:  /88   Pulse 64   Temp 97.7 °F (36.5 °C)   Resp 16   Ht 5' 3\" (1.6 m)   Wt 125 lb 4 oz (56.8 kg)   SpO2 100%   BMI 22.19 kg/m²       GEN: well developed, well nourished, NAD  HEENT: NCAT, PERRL, EOMI, mucous membranes pink and moist  CV: bradycardic rate regular rhythm, no murmurs, rubs or gallops  PULM: CTAB, no rales or rhonchi. Respirations WNL and unlabored  ABD: soft, NT, ND, BS+ and equal  NEURO: A&O x3. Sensation intact to light touch. MSK: Functional ROM BUEs and LLE. Weakness impairs ROM and strength RLE. Elizabeth Hwangt Strength 5/5 key muscles BUEs and LLE. R hip flexion 3/5, R knee extension 4+/5. EXTREMITIES: No calf tenderness to palpation bilaterally. Post op edema proximal RLE. Non pitting edema LLE. SKIN: warm dry and intact with good turgor. R lateral hip incision - dressing CD&I. No palpable induration. PSYCH: appropriately interactive. Affect WNL. Diagnostics:     CBC:   Recent Labs     09/20/22  1519   WBC 4.3   RBC 2.86*   HGB 8.7*   HCT 26.0*   MCV 90.9   RDW 16.9*        BMP:   Recent Labs     09/20/22  1519   *   K 4.0      CO2 16*   BUN 22   CREATININE 1.09*   GLUCOSE 82     BNP: No results for input(s): BNP in the last 72 hours. PT/INR: No results for input(s): PROTIME, INR in the last 72 hours. APTT: No results for input(s): APTT in the last 72 hours. CARDIAC ENZYMES: No results for input(s): CKMB, CKMBINDEX, TROPONINT in the last 72 hours. Invalid input(s): CKTOTAL;3 troponins   FASTING LIPID PANEL:  Lab Results   Component Value Date    CHOL 142 10/21/2014    HDL 58 10/21/2014    TRIG 62 10/21/2014     LIVER PROFILE: No results for input(s): AST, ALT, ALB, BILIDIR, BILITOT, ALKPHOS in the last 72 hours.      Current Medications:   Current Facility-Administered Medications: oxyCODONE-acetaminophen (PERCOCET) 5-325 MG per tablet 1 tablet, 1 tablet, Oral, Q4H PRN  enoxaparin (LOVENOX) injection 40 mg, 40 mg, SubCUTAneous, Daily  amLODIPine (NORVASC) tablet 5 mg, 5 mg, Oral, Daily  acetaminophen (TYLENOL) tablet 650 mg, 650 mg, Oral, Q4H PRN  polyethylene glycol (GLYCOLAX) packet 17 g, 17 g, Oral, Daily  senna (SENOKOT) tablet 17.2 mg, 2 tablet, Oral, Daily PRN  bisacodyl (DULCOLAX) suppository 10 mg, 10 mg, Rectal, Daily PRN  lactobacillus (CULTURELLE) capsule 1 capsule, 1 capsule, Oral, TID WC  aspirin chewable tablet 81 mg, 81 mg, Oral, Daily  ascorbic acid (VITAMIN C) tablet 500 mg, 500 mg, Oral, Once per day on Mon Thu  Vitamin D (CHOLECALCIFEROL) tablet 1,000 Units, 1,000 Units, Oral, Daily  hydroxychloroquine (PLAQUENIL) tablet 200 mg, 200 mg, Oral, Daily  levothyroxine (SYNTHROID) tablet 112 mcg, 112 mcg, Oral, Daily  guaiFENesin (MUCINEX) extended release tablet 1,200 mg, 1,200 mg, Oral, Daily  nicotine (NICODERM CQ) 7 MG/24HR 1 patch, 1 patch, TransDERmal, Daily  promethazine (PHENERGAN) tablet 12.5 mg, 12.5 mg, Oral, Q8H PRN  calcium elemental (OSCAL) tablet 500 mg, 500 mg, Oral, Once per day on Mon Thu  fluocinonide (LIDEX) 0.05 % cream, , Topical, BID      Impression/Plan:   Impaired ADLs, gait, and mobility due to: Fall with R hip fracture: s/p IM nail. PT/OT  for gait, mobility, strengthening, endurance, ADLs, and self care. Has Percocet prn pain. WBAT. On Calcium/vitamin D for bone healing. Will be POD #14 on 9/23. UTI: completed Keflex 9/19/22. Blood loss anemia: Hb low but stable. Monitoring  HTN: controlled without medication. Hypothyroidism: on levothyroxine  Nicotine dependence: on Nicoderm  Hyponatremia: Monitoring Na. IM following. Chronic sinusitis: on high dose Mucinex at home  CKD: Monitoring renal function  Lupus: on Plaquenil. Antiphospholipid antibody syndrome/Hx PE: has sissy filter. On Lovenox  Bowel Management: Miralax daily, Senokot prn, Dulcolax prn  Internal medicine for medical management  DVT prophylaxis:  on Lovenox for 4 weeks post-op.        Electronically signed by Rita Erwin MD on 9/21/2022 at 10:21 AM      This note is created with the assistance of a speech recognition program.  While intending to generate a document that actually reflects the content of the visit, the document can still have some errors including those of syntax and sound a like substitutions which may escape proof reading. In such instances, actual meaning can be extrapolated by contextual diversion.

## 2022-09-21 NOTE — OR NURSING
Writer assumed care of this patient at this time. Patient resting in bed, call light within reach, and bed alarm activated.

## 2022-09-21 NOTE — PROGRESS NOTES
[address]    Acute Rehabilitation Occupational Therapy Daily Treatment Note    Date: 22  Patient Name: Fabiola Gurrola       Room: 5590/6264-46  MRN: [id number]  Account: [de-identified]   : 1945  (78 y.o.) Gender: female       Referring Practitioner: Sweta Alejandra MD  Diagnosis: Fracture of hip, closed, right, sequela       Treatment Diagnosis: Impaired self care status    Past Medical History:  has a past medical history of CKD (chronic kidney disease) stage 3, GFR 30-59 ml/min, Dehydration, Dysphagia, GERD (gastroesophageal reflux disease), Blair filter in place, History of DVT (deep vein thrombosis), Hx: UTI (urinary tract infection), Hypertension, Hyperthyroidism, Hyperthyroidism, Hypothyroidism, Iron deficiency anemia, Lupus ([address] Metabolic acidosis, Pharyngitis, Pulmonary embolism ([address] Pyelonephritis, Pyelonephritis, Renal abscess, Right kidney mass, Seizures ([address] and SLE ([facility] encephalitis). Past Surgical History:   has a past surgical history that includes Vena Cava Filter Placement; Hysterectomy; Bunionectomy; Appendectomy; Colonoscopy; Bunionectomy; and Revision Colostomy. Restrictions  Restrictions/Precautions  Restrictions/Precautions: Weight Bearing, Fall Risk, General Precautions, Up as Tolerated  Required Braces or Orthoses?: No  Implants present? : Metal implants (R ERNESTO)     Position Activity Restriction  Other position/activity restrictions: RLE FWBAT  Lower Extremity Weight Bearing Restrictions  Right Lower Extremity Weight Bearing: Weight Bearing As Tolerated (FWBAT)              Subjective  Subjective  Subjective: \"It's my first time without him. \"  going thru health problem since her  . Pain: at 2:00 RN notes pt on hold await doppler, at 2:40 RN, Otilio Cockayne notes doppler completed rec continue to hold therapy.        Objective  Cognition      fair         Activities of Daily Living  Feeding  Assistance Level: Independent    Grooming/Oral Hygiene  Assistance Level: Stand by assist  Skilled Clinical Factors: standing at sink to brush dentures. Upper Extremity Bathing  Assistance Level: Set-up  Skilled Clinical Factors: seated on shower bench    Lower Extremity Bathing  Assistance Level: Set-up  Skilled Clinical Factors: seated on tub bench throughout including washing bottom and suzi    Upper Extremity Dressing  Assistance Level: Set-up  Skilled Clinical Factors: donning/doffing [place] shirt while seated. Lower Extremity Dressing  Assistance Level: Stand by assist  Skilled Clinical Factors: underpants and pants. SBA standing to pull over hips. Putting On/Taking Off Footwear  Equipment Provided: Reachers;Sock aid  Assistance Level: Minimal assistance  Skilled Clinical Factors: TA for TEDs, doffing footies with reacher, use of sock aid to donning footies              Tub/Shower Transfers  Type: Shower  Transfer From: Rolling walker  Transfer To: Tub transfer bench  Assistance Level: Contact guard assist                                            Mobility                            Sit to Stand  Assistance Level: Stand by assist  Stand to Sit  Assistance Level: Stand by assist  Bed To/From Chair  Assistance Level: Stand by assist  Skilled Clinical Factors: RW amb from w/c in bathroom to recliner chair transfer. Functional Mobility  Device: Rolling walker  Activity: To/From bathroom  Assistance Level: Stand by assist            OT Exercises  Dynamic Standing Balance Exercises: 8 min, 3-5 min x 3 with SBA and RW during adls. Assessment  Assessment  Activity Tolerance: Patient tolerated treatment well (at 2 pm nsg states to hold therapy due to need for doppler.)                        Goals  Patient Goals   Patient goals :  \"To get as much accomplished as I can and to come out stronger to keep up with my jobs, my grandchildren, and the little ones\"  Short Term Goals  Time Frame for Short term goals: One week  Short Term Goal 1: Pt will complete LB dressing/bathing/toileting tasks with CGA and Good safety to improve IND and safety with self care tasks. Short Term Goal 2: Pt will complete UB dressing/bathing tasks with supervision and Good safety to improve IND and safety with self care tasks. Short Term Goal 3: Pt will complete functional transfers/mobility with CGA and Good safety to improve IND and safety with self care and mobility tasks. Short Term Goal 4: Pt will tolerate standing for 6+ minutes during functional activity of choice with CGA and Good safety to improve IND and safety with self care and mobility tasks. Short Term Goal 5: Pt will verbalize/demonstrate Good understanding of at least 3 AD/DME/modified techniques to improve IND and safety with self care and mobility tasks. Additional Goals?: Yes  Short Term Goal 6: OTR/EM to further assess FMC/ strength. Short Term Goal 7: Pt will participate in 30+ minutes of therapeutic exercises/functional activities to improve IND and safety with self care and mobility tasks. Long Term Goals  Time Frame for Long term goals : By discharge  Long Term Goal 1: Pt will complete BADLs with modified IND and Good safety  to improve IND and safety with self care tasks. Long Term Goal 2: Pt will complete functional transfers/mobility with modified IND and Good safety to improve IND and safety with self care and mobility tasks. Long Term Goal 3: Pt will tolerate standing for 12+ minutes during functional activity of choice with modified IND and Good safety to improve IND and safety with self care and mobility tasks. Long Term Goal 4: Pt will verbalize/demonstrate Good understanding of at least 3 fall prevention/home safety strategies to improve IND and safety with self care and mobility tasks. Long Term Goal 5: Pt will complete medication management task with 100% accuracy with use of adaptive strategies to improve safety.   Additional Goals?: Yes  Long Term Goal 6: Pt will complete light housekeeping/meal preparation task with supervision and Good safety to improve IND and safety with ADL/IADL tasks. Plan  Plan  Times per Week: 5-7  Times per Day: Twice a day  Current Treatment Recommendations: Strengthening, ROM, Balance training, Functional mobility training, Endurance training, Pain management, Safety education & training, Patient/Caregiver education & training, Equipment evaluation, education, & procurement, Self-Care / ADL, Return to work related activity, Coordination training      OT Individual Minutes  OT Individual Minutes  Time In: 1004  Time Out: 1522  Minutes: 65    Varience 25 min due to medical hold. at 2:00 RN notes pt on hold await doppler, at 2:40 RN, Jasmine Baumgarten notes doppler completed rec continue to hold therapy.       Electronically signed by Kemar Reynoso OT on 9/21/22 at 3:02 PM EDT

## 2022-09-21 NOTE — PLAN OF CARE
Problem: Discharge Planning  Goal: Discharge to home or other facility with appropriate resources  9/21/2022 1041 by Major Massing  Outcome: Progressing     Problem: Safety - Adult  Goal: Free from fall injury  9/21/2022 1041 by Major Massing  Outcome: Progressing     Problem: ABCDS Injury Assessment  Goal: Absence of physical injury  9/21/2022 1041 by Major Massing  Outcome: Progressing     Problem: Nutrition Deficit:  Goal: Optimize nutritional status  9/21/2022 1041 by Major Massing  Outcome: Progressing     Problem: Pain  Goal: Verbalizes/displays adequate comfort level or baseline comfort level  9/21/2022 1041 by Major Massing  Outcome: Progressing     Problem: Skin/Tissue Integrity  Goal: Absence of new skin breakdown  Description: 1. Monitor for areas of redness and/or skin breakdown  2. Assess vascular access sites hourly  3. Every 4-6 hours minimum:  Change oxygen saturation probe site  4. Every 4-6 hours:  If on nasal continuous positive airway pressure, respiratory therapy assess nares and determine need for appliance change or resting period.   Outcome: Progressing

## 2022-09-21 NOTE — CARE COORDINATION
BORA spoke to Dr. Veloz Cure office and they are okay for [facility] to take out staples tomorrow on day PO #15.  BORA made an appointment for her to get X-Rays at [address] [place] [address] on Wednesday September 28th at 1:10pm. BORA added it to the AVS.

## 2022-09-21 NOTE — PROGRESS NOTES
[facility] Internal Medicine    Progress note            Date:   9/18/2022  Patient name:  Erik Asif  Date of admission:  9/15/2022  2:31 PM  MRN:   [id number]  Account:  [de-identified]  YOB: 1945  PCP:    Christiano Gandara DO  Room:   [facility]/2617-  Code Status:    Full Code    Physician Requesting Consult: Vineet Stein MD    Reason for Consult:  Medical management    Chief Complaint:     No chief complaint on file. Debility secondary to right hip surgery    History Obtained From:     Patient, EMR, nursing staff    History of Present Illness:     19-year-old female admitted from outside hospital after ORIF for right hip fracture performed on 9/9.   Course complicated by anemia hemoglobin to 5.9 status post transfusion medical history significant for Sjogren's for which she is taking Plaquenil, CKD stage III antiphospholipid antibody syndrome, history of PE patient has IVC filter not on any AC      Past Medical History:     Past Medical History:   Diagnosis Date    CKD (chronic kidney disease) stage 3, GFR 30-59 ml/min     Dehydration     Dysphagia     GERD (gastroesophageal reflux disease)     Ewing filter in place     History of DVT (deep vein thrombosis)     Hx: UTI (urinary tract infection)     Hypertension     Hyperthyroidism 1995    XRT    Hyperthyroidism     Hypothyroidism     due to Rx for Graves disease    Iron deficiency anemia     Lupus (HCC)     Metabolic acidosis     Pharyngitis     Pulmonary embolism (HCC)     Pyelonephritis     Pyelonephritis     Renal abscess     Right kidney mass     Seizures ([facility]     SLE ([facility] encephalitis)         Past Surgical History:     Past Surgical History:   Procedure Laterality Date    APPENDECTOMY      BUNIONECTOMY      BUNIONECTOMY      COLONOSCOPY      HYSTERECTOMY      REVISION COLOSTOMY      VENA CAVA FILTER PLACEMENT          Medications Prior to Admission:     Prior to Admission medications Mother     Cancer Father     Heart Disease Father     High Blood Pressure Sister     Substance Abuse Brother     Stroke Maternal Grandmother     Stroke Paternal Grandfather        Review of Systems:     Positive and Negative as described in HPI. CONSTITUTIONAL:  negative for fevers, chills, sweats, fatigue, weight loss  HEENT:  negative for vision, hearing changes, runny nose, throat pain  RESPIRATORY:  negative for shortness of breath, cough, congestion, wheezing. CARDIOVASCULAR:  negative for chest pain, palpitations. GASTROINTESTINAL:  negative for nausea, vomiting, diarrhea, constipation, change in bowel habits, abdominal pain   GENITOURINARY:  negative for difficulty of urination, burning with urination, frequency   INTEGUMENT:  negative for rash, skin lesions, easy bruising   HEMATOLOGIC/LYMPHATIC:  negative for swelling/edema   ALLERGIC/IMMUNOLOGIC:  negative for urticaria , itching  ENDOCRINE:  negative increase in drinking, increase in urination, hot or cold intolerance  MUSCULOSKELETAL:  post op right  hip pain, negative joint pains, muscle aches, swelling of joints  NEUROLOGICAL:  negative for headaches, dizziness, lightheadedness, numbness, pain, tingling extremities      Physical Exam:     /88   Pulse 64   Temp 97.7 °F (36.5 °C)   Resp 16   Ht 5' 3\" (1.6 m)   Wt 125 lb 4 oz (56.8 kg)   SpO2 100%   BMI 22.19 kg/m²   Temp (24hrs), Av.2 °F (36.8 °C), Min:97.7 °F (36.5 °C), Max:98.7 °F (37.1 °C)        General Appearance:  alert, well appearing, and in no acute distress  Head:  normocephalic, atraumatic.   Eye: no icterus, redness, pupils equal and reactive, extraocular eye movements intact, conjunctiva clear  Ear: normal external ear, no discharge, hearing intact  Nose:  no drainage noted  Mouth: mucous membranes moist  Neck: supple, no carotid bruits, thyroid not palpable  Lungs: Bilateral equal air entry, clear to ausculation, no wheezing, rales or rhonchi, normal effort  Cardiovascular: normal rate, regular rhythm, no murmur, gallop, rub.   Abdomen: Soft, nontender, nondistended, normal bowel sounds, no hepatomegaly or splenomegaly  Neurologic: There are no new focal motor or sensory deficits, normal muscle tone and bulk, no abnormal sensation, normal speech, cranial nerves II through XII grossly intact  Skin: No gross lesions, rashes, bruising or bleeding on exposed skin area  Extremities: right hip pain and restriction ROM,  peripheral pulses palpable, no pedal edema or calf pain with palpation  Psych: normal affect    Investigations:      Laboratory Testing:  Recent Results (from the past 24 hour(s))   Basic Metabolic Panel    Collection Time: 09/20/22  3:19 PM   Result Value Ref Range    Glucose 82 70 - 99 mg/dL    BUN 22 8 - 23 mg/dL    Creatinine 1.09 (H) 0.50 - 0.90 mg/dL    Calcium 9.4 8.6 - 10.4 mg/dL    Sodium 130 (L) 135 - 144 mmol/L    Potassium 4.0 3.7 - 5.3 mmol/L    Chloride 103 98 - 107 mmol/L    CO2 16 (L) 20 - 31 mmol/L    Anion Gap 11 9 - 17 mmol/L    GFR Non-African American 49 (L) >60 mL/min    GFR  59 (L) >60 mL/min    GFR Comment         CBC    Collection Time: 09/20/22  3:19 PM   Result Value Ref Range    WBC 4.3 3.5 - 11.0 k/uL    RBC 2.86 (L) 4.0 - 5.2 m/uL    Hemoglobin 8.7 (L) 12.0 - 16.0 g/dL    Hematocrit 26.0 (L) 36 - 46 %    MCV 90.9 80 - 100 fL    MCH 30.3 26 - 34 pg    MCHC 33.3 31 - 37 g/dL    RDW 16.9 (H) 11.5 - 14.9 %    Platelets 891 548 - 529 k/uL    MPV 6.8 6.0 - 12.0 fL         Imaging/Diagonstics:  Recent data reviewed    Assessment :      Primary Problem  Fracture of hip, closed, right, sequela    Active Hospital Problems    Diagnosis Date Noted    Fracture of hip, closed, right, sequela [S72.001S] 09/15/2022     Priority: Medium       Plan:     Debility secondary to right hip surgery ORIF for fracture-continue with physical therapy  Sjogren syndrome-continue with Plaquenil  CKD stage III-creatinine monitoring, 1.02 on admission  Acute blood loss anemia noted postop-will monitor hemoglobin. 9.0 today on admission  Hypothyroid-resume Synthroid  UTI-complete course of Keflex  Mild hyponatremia-monitor sodium; 131 on admission  History of PE, APL antibody-IVC filter in situ    DVT prophylaxis-mechanical only      9/20  Patient seen and examined, participating in physical therapy no new issues  Continue current management  Will repeat labs to monitor sodium, hemoglobin    9/21  Sodium 130 today stable from 131 at presentation  Hemoglobin 8.7  Creatinine stable  Swelling right leg with calf tenderness-we will get leg vein Dopplers  No significant swelling suggestive of hematoma at the operative site          Consultations:   Camila Cisneros MD  9/21/2022  12:34 PM    Copy sent to Dr. Kesha Vazquez, DO    Please note that this chart was generated using voice recognition Dragon dictation software. Although every effort was made to ensure the accuracy of this automated transcription, some errors in transcription may have occurred.

## 2022-09-21 NOTE — PROGRESS NOTES
Physical Therapy  Facility/Department: [facility]  Rehabilitation Physical Therapy    NAME: Ernest Schlatter  : 1945 (78 y.o.)  MRN: [id number]  CODE STATUS: Full Code    Date of Service: 22      Past Medical History:   Diagnosis Date    CKD (chronic kidney disease) stage 3, GFR 30-59 ml/min     Dehydration     Dysphagia     GERD (gastroesophageal reflux disease)     Abelardo filter in place     History of DVT (deep vein thrombosis)     Hx: UTI (urinary tract infection)     Hypertension     Hyperthyroidism     XRT    Hyperthyroidism     Hypothyroidism     due to Rx for Graves disease    Iron deficiency anemia     Lupus (HCC)     Metabolic acidosis     Pharyngitis     Pulmonary embolism (HCC)     Pyelonephritis     Pyelonephritis     Renal abscess     Right kidney mass     Seizures ([address]     SLE ([facility] encephalitis)      Past Surgical History:   Procedure Laterality Date    APPENDECTOMY      BUNIONECTOMY      BUNIONECTOMY      COLONOSCOPY      HYSTERECTOMY      REVISION COLOSTOMY      VENA CAVA FILTER PLACEMENT         Chart Reviewed: Yes  Patient assessed for rehabilitation services?: Yes  Additional Pertinent Hx: The patient is a 78 y.o. female, S/P mechanical fall at home with subsequent sustained R femur fracture, S/P Intramedullary trochanteric fixation nail to R Hip Performed by Dr. Gela Tong on 22  Family / Caregiver Present: No  Referring Practitioner: Dr. Helga Linares  Referral Date : 09/15/22  Diagnosis: Closed R Hip Fracture; S/P R IM nailing 22    Restrictions:  Restrictions/Precautions: Weight Bearing; Fall Risk;General Precautions; Up as Tolerated  Lower Extremity Weight Bearing Restrictions  Right Lower Extremity Weight Bearing: Weight Bearing As Tolerated (FWBAT)  Position Activity Restriction  Other position/activity restrictions: RLE FWBAT     SUBJECTIVE  Subjective: Patient arrived to gym using RW, noted R leg pain rating it 7.5/10 on pain scale.  Swelling still present on BLE's and patient complained of numbness beginning around 3:00 pm the past few days. Patient in recliner upon arrival in pm,  came in shortly after to evaluate pain and decided to do a doppler scan.  gave writer verbal order with RN Memo Ellis present to defer therapy until doppler completed. Patient was then transferred from recliner to EOB using Rolling walker with slight LOB during stand to sit. Patient returned to bed for procedure ordered. OBJECTIVE            Cognition  Overall Cognitive Status: WFL         Functional Mobility  Bed mobility  Bridging: Stand by assistance  Rolling to Left: Minimal assistance  Rolling to Right: Minimal assistance  Supine to Sit: Stand by assistance  Sit to Supine: Contact guard assistance  Scooting: Independent  Bed Mobility Comments: patient prefers self assisting RLE into bed with UE support vs AD  Transfers  Sit to Stand: Contact guard assistance (intermittent SBA)  Stand to sit: Contact guard assistance (intermittent SBA)  Stand Pivot Transfers: Contact guard assistance  Comment: Patient had LOB during stand to sit transfer causing her to plop on the bed pm    Environmental Mobility  Ambulation  Surface: level tile  Device: Rolling Walker  Other Apparatus:    Assistance: Stand by assistance  Quality of Gait: Flexed posture, decreased STEVE, decreased step length  Gait Deviations: Slow Natalya;Decreased step length;Decreased step height  Distance: 190 ft  Comments: fatigue with gait distance  More Ambulation?: Yes  Ambulation 2  Surface - 2: ramp  Device 2: Rolling Walker  Assistance 2: Stand by assistance  Follow with W/C  Quality of Gait 2: Flexed posture, decreased step length, decreased STEVE  Gait Deviations: Decreased step length; Slow Natalya  Distance: 28 ft  Comments: Patient needed VC's to stand tall and bring the RW closer to patients body.   Ambulation 3  Surface - 3: level tile  Device 3: Rollator  Assistance 3: Stand by assistance  Quality of Gait 3: Slow day, decreased step length  Gait Deviations: Slow Day;Decreased step length  Distance: 164 ft  Stairs/Curb  Stairs?: No    PT Exercises  Exercise Treatment: supine BLE, EOB BLE  Resistive Exercises: Abduction and adduction using ball x 20 reps. Hamstring curls with orange band. LAQ with 1.5 pound weight x 20 reps. Circulation/Endurance Exercises: Arm bike x 4 min. slight resistance, ankle pumps x 20 reps. Exercise Equipment: NuStep 10 minutes am L4    ASSESSMENT       Activity Tolerance  Activity Tolerance: Patient tolerated treatment well              GOALS  Patient Goals   Patient goals : To walk better  Short Term Goals  Time Frame for Short term goals: 5 days  Short term goal 1: Pt to demo All bed mobility with CGA using Gait belt as leglift assist as needed  Short term goal 2: pt to demo all transfers with RW and SBA  Short term goal 3: pt to ambulate 150' with RW and SBA  Short term goal 4: pt to negotiate 5 steps with rails and AD as needed to allow for safe home access. Short term goal 5: pt to identify effective methods for reducing pain and swelling (including proper LE Elevation techinque/Icing Technique)  Additional Goals?: Yes  Short Term Goal 6: Pt to demonstrate good technique for HEP exercises to improve pt's independence with self-care exercise routine  Long Term Goals  Time Frame for Long term goals : Until D/C  Long term goal 1: Pt to demo All bed mobility independently using Gait belt as leglift assist as needed  Long term goal 2: pt to demo all transfers with RW Mod I  Long term goal 3: pt to ambulate 150' with RW Mod I on level surfaces, 10-30' ramped surface RW Supervision, and Outdoor terrain with RW and SBA  Long term goal 4: pt to negotiate 5 steps with rails, SBA, and AD as needed to allow for safe home access.   Long term goal 5: pt to demo simulated or actual car transfer with safe technique, appropriate device, and SBA  Additional Goals?: Yes  Long term goal 6: Pt to Improve Distance on 2MWT By 113' to demo Minimal Detectable Change in walking speed. Goal is to walk 198-200' in 2 Minutes. PLAN OF CARE  Frequency: 1-2 treatment sessions per day, 5-7 days per week  Safety Devices  Type of Devices: Call light within reach; Left in bed    EDUCATION  Education  Education Given To: Patient  Education Provided: Equipment  Education Provided Comments: Educated patient on use of rollator, using the lock breaks to sit when dizziness or LOB presents. Education Method: Demonstration;Verbal  Barriers to Learning: None  Education Outcome: Verbalized understanding;Demonstrated understanding      Therapy Time     09/21/22 1319 09/21/22 1442   PT Individual Minutes   Time In [id number]   Time Out 6722 [id number]   Minutes 48 Brannon Mosquear 13, 09/21/22 at 2:53 PM   The above documentation and treatment completed by Student Physical Therapist Assistant (SPTA)  was provided under the supervision in the direct line of sight and documented by the student, was reviewed and accepted by supervising Clinical Instructor Almita Aguilar. MADALYN Delvalle.

## 2022-09-21 NOTE — PLAN OF CARE
Problem: Discharge Planning  Goal: Discharge to home or other facility with appropriate resources  9/21/2022 0448 by Tr Louise RN  Outcome: Progressing     Problem: Safety - Adult  Goal: Free from fall injury  9/21/2022 0448 by Tr Louise RN  Outcome: Progressing    Problem: ABCDS Injury Assessment  Goal: Absence of physical injury  9/21/2022 0448 by Tr Louise RN  Outcome: Progressing  Flowsheets  Taken 9/21/2022 0050 by Tr Louise RN  Absence of Physical Injury: Implement safety measures based on patient assessment    Problem: Pain  Goal: Verbalizes/displays adequate comfort level or baseline comfort level  9/21/2022 0448 by Tr Louise RN  Outcome: Progressing

## 2022-09-21 NOTE — PROGRESS NOTES
09/21/22 1410   Encounter Summary   Encounter Overview/Reason  Volunteer Encounter   Service Provided For: Patient   Referral/Consult From: Rounding   Last Encounter  09/21/22  (V)   Complexity of Encounter Low   Spiritual/Emotional needs   Type Spiritual Support   Rituals, Rites and [address]

## 2022-09-22 PROCEDURE — 6370000000 HC RX 637 (ALT 250 FOR IP): Performed by: INTERNAL MEDICINE

## 2022-09-22 PROCEDURE — 6370000000 HC RX 637 (ALT 250 FOR IP): Performed by: PHYSICAL MEDICINE & REHABILITATION

## 2022-09-22 PROCEDURE — 99232 SBSQ HOSP IP/OBS MODERATE 35: CPT | Performed by: PHYSICAL MEDICINE & REHABILITATION

## 2022-09-22 PROCEDURE — 97116 GAIT TRAINING THERAPY: CPT

## 2022-09-22 PROCEDURE — 97110 THERAPEUTIC EXERCISES: CPT

## 2022-09-22 PROCEDURE — 97530 THERAPEUTIC ACTIVITIES: CPT

## 2022-09-22 PROCEDURE — 6360000002 HC RX W HCPCS: Performed by: PHYSICAL MEDICINE & REHABILITATION

## 2022-09-22 PROCEDURE — 1180000000 HC REHAB R&B

## 2022-09-22 PROCEDURE — 99231 SBSQ HOSP IP/OBS SF/LOW 25: CPT | Performed by: INTERNAL MEDICINE

## 2022-09-22 PROCEDURE — 97535 SELF CARE MNGMENT TRAINING: CPT

## 2022-09-22 RX ORDER — OXYCODONE HYDROCHLORIDE AND ACETAMINOPHEN 5; 325 MG/1; MG/1
1 TABLET ORAL EVERY 6 HOURS PRN
Qty: 28 TABLET | Refills: 0 | Status: SHIPPED | OUTPATIENT
Start: 2022-09-22 | End: 2022-09-29

## 2022-09-22 RX ORDER — LACTOBACILLUS RHAMNOSUS GG 10B CELL
1 CAPSULE ORAL
COMMUNITY
Start: 2022-09-22

## 2022-09-22 RX ORDER — ENOXAPARIN SODIUM 100 MG/ML
40 INJECTION SUBCUTANEOUS DAILY
Qty: 14 EACH | Refills: 0 | Status: SHIPPED | OUTPATIENT
Start: 2022-09-23

## 2022-09-22 RX ORDER — POLYETHYLENE GLYCOL 3350 17 G/17G
17 POWDER, FOR SOLUTION ORAL DAILY PRN
Qty: 527 G | Refills: 1 | COMMUNITY
Start: 2022-09-22 | End: 2022-10-22

## 2022-09-22 RX ORDER — AMLODIPINE BESYLATE 5 MG/1
5 TABLET ORAL DAILY
Qty: 30 TABLET | Refills: 0 | Status: SHIPPED | OUTPATIENT
Start: 2022-09-23

## 2022-09-22 RX ORDER — ASCORBIC ACID 500 MG
500 TABLET ORAL
Qty: 30 TABLET | Refills: 3 | COMMUNITY
Start: 2022-09-26

## 2022-09-22 RX ADMIN — Medication 1 CAPSULE: at 16:53

## 2022-09-22 RX ADMIN — GUAIFENESIN 1200 MG: 600 TABLET, EXTENDED RELEASE ORAL at 07:43

## 2022-09-22 RX ADMIN — FLUOCINONIDE: 0.5 CREAM TOPICAL at 21:04

## 2022-09-22 RX ADMIN — OXYCODONE HYDROCHLORIDE AND ACETAMINOPHEN 1 TABLET: 5; 325 TABLET ORAL at 03:55

## 2022-09-22 RX ADMIN — POLYETHYLENE GLYCOL 3350 17 G: 17 POWDER, FOR SOLUTION ORAL at 07:44

## 2022-09-22 RX ADMIN — OXYCODONE HYDROCHLORIDE AND ACETAMINOPHEN 1 TABLET: 5; 325 TABLET ORAL at 21:03

## 2022-09-22 RX ADMIN — ENOXAPARIN SODIUM 40 MG: 100 INJECTION SUBCUTANEOUS at 07:44

## 2022-09-22 RX ADMIN — Medication 1 CAPSULE: at 11:01

## 2022-09-22 RX ADMIN — OXYCODONE HYDROCHLORIDE AND ACETAMINOPHEN 1 TABLET: 5; 325 TABLET ORAL at 16:53

## 2022-09-22 RX ADMIN — CALCIUM 500 MG: 500 TABLET ORAL at 07:43

## 2022-09-22 RX ADMIN — LEVOTHYROXINE SODIUM 112 MCG: 112 TABLET ORAL at 06:06

## 2022-09-22 RX ADMIN — FLUOCINONIDE: 0.5 CREAM TOPICAL at 07:48

## 2022-09-22 RX ADMIN — ASPIRIN 81 MG 81 MG: 81 TABLET ORAL at 07:43

## 2022-09-22 RX ADMIN — OXYCODONE HYDROCHLORIDE AND ACETAMINOPHEN 1 TABLET: 5; 325 TABLET ORAL at 12:20

## 2022-09-22 RX ADMIN — OXYCODONE HYDROCHLORIDE AND ACETAMINOPHEN 1 TABLET: 5; 325 TABLET ORAL at 07:43

## 2022-09-22 RX ADMIN — HYDROXYCHLOROQUINE SULFATE 200 MG: 200 TABLET ORAL at 07:51

## 2022-09-22 RX ADMIN — AMLODIPINE BESYLATE 5 MG: 5 TABLET ORAL at 07:43

## 2022-09-22 RX ADMIN — Medication 1000 UNITS: at 07:43

## 2022-09-22 RX ADMIN — Medication 1 CAPSULE: at 07:43

## 2022-09-22 RX ADMIN — OXYCODONE HYDROCHLORIDE AND ACETAMINOPHEN 500 MG: 500 TABLET ORAL at 07:43

## 2022-09-22 ASSESSMENT — PAIN SCALES - GENERAL
PAINLEVEL_OUTOF10: 8
PAINLEVEL_OUTOF10: 8
PAINLEVEL_OUTOF10: 9
PAINLEVEL_OUTOF10: 8
PAINLEVEL_OUTOF10: 6
PAINLEVEL_OUTOF10: 8

## 2022-09-22 ASSESSMENT — PAIN DESCRIPTION - LOCATION
LOCATION: HIP
LOCATION: LEG

## 2022-09-22 ASSESSMENT — PAIN DESCRIPTION - ORIENTATION
ORIENTATION: RIGHT

## 2022-09-22 ASSESSMENT — PAIN DESCRIPTION - DESCRIPTORS
DESCRIPTORS: ACHING

## 2022-09-22 NOTE — PROGRESS NOTES
Physical Therapy  Facility/Department: [facility] ACUTE REHAB  Rehabilitation Physical Therapy Daily Treatment Note    NAME: Margot Geller  : 1945 (78 y.o.)  MRN: [id number]  CODE STATUS: Full Code    Date of Service: 22      Chart Reviewed: Yes  Patient assessed for rehabilitation services?: Yes  Additional Pertinent Hx: The patient is a 78 y.o. female, S/P mechanical fall at home with subsequent sustained R femur fracture, S/P Intramedullary trochanteric fixation nail to R Hip Performed by Dr. Francisco Kwong on 22  Family / Caregiver Present: No  Referring Practitioner: Dr. Roseline Tovar  Referral Date : 09/15/22  Diagnosis: Closed R Hip Fracture; S/P R IM nailing 22    Restrictions:  Restrictions/Precautions: Weight Bearing; Fall Risk;General Precautions; Up as Tolerated  Lower Extremity Weight Bearing Restrictions  Right Lower Extremity Weight Bearing: Weight Bearing As Tolerated (FWBAT)  Position Activity Restriction  Other position/activity restrictions: RLE FWBAT     SUBJECTIVE  Subjective: Pt is pleasant and agreeable to PT. Pt states\"If I just had 4 more day's, I think I would be able to knock it out of the park and really feel better about going home\". Pain: \"-8/10\"           OBJECTIVE  Vision  Vision: Within Functional Limits    Hearing  Hearing: Within functional limits    Cognition  Overall Cognitive Status: WFL         Sensation  Overall Sensation Status: WFL    Functional Mobility  Bed mobility  Rolling to Left: Modified independent  Rolling to Right: Modified independent  Supine to Sit: Modified independent  Sit to Supine: Modified independent  Scooting: Independent  Bed Mobility Comments: patient prefers self assisting RLE into bed with UE support vs AD. Pt completed on PT mat with height similar to pt's home bed height. Transfers  Sit to Stand: Supervision  Stand to sit: Supervision  Comment: Transfers completed with RW and Rollator this date. Good safety awareness noted.  Utilized [employer] to simulate car transfer set up. Pt demos good technique at this time. PM: Pt demos decrease safety techniques with transfers while distracted during conversation. Balance  Posture: Good  Sitting - Static: Good  Sitting - Dynamic: Fair;+  Standing - Static: Fair (RW)  Standing - Dynamic: Fair;- (RW)  Comments: Standing balance assessed with RW, No LOB to report    Environmental Mobility  Ambulation  Surface: level tile  Device: Rollator  Assistance: Supervision (Mod I for shorter distances and Supervision for longer distances.)  Quality of Gait: Flexed posture, decreased STEVE, decreased step length  Gait Deviations: Slow Day;Decreased step length;Decreased step height  Distance: 2MWT: 160' and finished a total of 249' in 3min and 14sec. Comments: fatigue with gait distance  More Ambulation?: Yes  Ambulation 3  Surface - 3: level tile  Device 3: Rolling Walker  Assistance 3: Supervision  Quality of Gait 3: Slow day, decreased step length  Gait Deviations: Slow Day;Decreased step length  Distance: AM: 40'x2 and shorter distances. PM: 15'x2  Stairs/Curb  Stairs?: No  Stairs  # Steps : 6  Stairs Height: 6\"  Rails: Right ascending  Curbs: 6\"  Device: No Device  Assistance: Stand by assistance  Comment: With Cues/Edu/Demo pt demos good carry over sequence and stepping pattern to maximize safety. Pt able to practice various techniques to ascend stairs with x1 rail and RW. F/L/R techniques, however pt continues to require rail UE support despite edu on using RW. Pt states she will contact \"[employer]\" to install a rail for outside stairs. PT Exercises  PROM Exercises: BLE gastroc stretches 30\"x 3  A/AROM Exercises: Standing BLE AROM in with rollator x 10 including heel raises, marches, HS curls. Limited ROM noted on RLE  Resistive Exercises: Abduction and adduction using ball x 20 reps. Hamstring curls with orange band. LAQ with 1.5 pound weight x 20 reps.   Dynamic Standing Balance Exercises: Pt performs x4 Stoop and recover of objects from floor using Reacher with CGA for standing balance while using Rollator for Unilateral UE support. Exercise Equipment: NuStep 13 minutes am L4.    ASSESSMENT  Vitals  O2 Device: None (Room air)    Activity Tolerance  Activity Tolerance: Patient tolerated treatment well (at 2 pm nsg states to hold therapy due to need for doppler.)  Activity Tolerance Comments: Pt needs frequent rest breaks d/t fatigue and SOB    Assessment  Assessment: Pt presents with RLE ROM and Strength deficits impairing balance, safety, and tolerance for activity and mobility s/p R Femur IM Nailing to stabilize femur fracture after mechanical fall. The patient will benefit from continued PT services and Family training to address deficits and maximize safety and independence with mobility. Performance Deficits/Impairments: Decreased functional mobility ; Decreased ADL status; Decreased ROM; Decreased strength;Decreased tolerance to work activity; Decreased body mechanics; Decreased endurance;Decreased sensation;Decreased balance; Increased pain  Treatment Diagnosis: Impaired functional mobility 2* Closed R Hip Fracture and s/p R Femur IM Nailing  Therapy Prognosis: Excellent  Decision Making: Medium Complexity  Exam: ROM, MMT, Balance, And functional mobility assessments; 2MWT  Clinical Presentation: Evolving; Pt is alert, cooperative, and pleasant throughout; Very motivated  Barriers to Learning: none  Treatment Initiated : Functional mobility training; AAROM/AROM BLE  Discharge Recommendations: Patient would benefit from continued therapy after discharge  PT D/C Equipment  Equipment Needed: No (Pt has RW to use for home)    CLINICAL IMPRESSION   Pt presents with RLE ROM and Strength deficits impairing balance, safety, and tolerance for activity and mobility s/p R Femur IM Nailing to stabilize femur fracture after mechanical fall.  The patient will benefit from continued PT services and Family training to address deficits and maximize safety and independence with mobility. GOALS  Patient Goals   Patient goals : To walk better  Short Term Goals  Time Frame for Short term goals: 5 days  Short term goal 1: Pt to demo All bed mobility with CGA using Gait belt as leglift assist as needed  Short term goal 2: pt to demo all transfers with RW and SBA  Short term goal 3: pt to ambulate 150' with RW and SBA  Short term goal 4: pt to negotiate 5 steps with rails and AD as needed to allow for safe home access. Short term goal 5: pt to identify effective methods for reducing pain and swelling (including proper LE Elevation techinque/Icing Technique)  Additional Goals?: Yes  Short Term Goal 6: Pt to demonstrate good technique for HEP exercises to improve pt's independence with self-care exercise routine  Long Term Goals  Time Frame for Long term goals : Until D/C  Long term goal 1: Pt to demo All bed mobility independently using Gait belt as leglift assist as needed  Long term goal 2: pt to demo all transfers with RW Mod I  Long term goal 3: pt to ambulate 150' with RW Mod I on level surfaces, 10-30' ramped surface RW Supervision, and Outdoor terrain with RW and SBA  Long term goal 4: pt to negotiate 5 steps with rails, SBA, and AD as needed to allow for safe home access. Long term goal 5: pt to demo simulated or actual car transfer with safe technique, appropriate device, and SBA  Additional Goals?: Yes  Long term goal 6: Pt to Improve Distance on 2MWT By 113' to demo Minimal Detectable Change in walking speed. Goal is to walk 198-200' in 2 Minutes. PLAN OF CARE  Plan  Plan:  minutes of therapy at least 5 out of 7 days a week  Specific Instructions for Next Treatment: Progress ambulation; Nu-step; AROM/AAROM  Safety Devices  Type of Devices: Call light within reach; Left in bed  Restraints  Restraints Initially in Place: No    EDUCATION  Education  Education Given To: Patient  Education Provided: Equipment  Education Provided Comments: Educated patient on use of rollator, using the lock breaks to sit when dizziness or LOB presents. Edu on HEP with written handout given. Pt V good understanding at this time.    Education Method: Demonstration;Verbal  Barriers to Learning: None  Education Outcome: Verbalized understanding;Demonstrated understanding           Therapy Time     09/22/22 0759 09/22/22 1300   PT Individual Minutes   Time In 0900 1300   Time Out 1005 1345   Minutes [phone] Meredith Combs, [place], 09/22/22 at 3:25 PM

## 2022-09-22 NOTE — PROGRESS NOTES
Comprehensive Nutrition Assessment    Type and Reason for Visit:  Reassess    Nutrition Recommendations/Plan:   Continue current diet  Start Ensure Enlive chocolate 1x/day. Malnutrition Assessment:  Malnutrition Status: At risk for malnutrition (Comment) (09/16/22 7051)    Context:  Acute Illness     Findings of the 6 clinical characteristics of malnutrition:  Energy Intake:  Mild decrease in energy intake (Comment)  Weight Loss:  Unable to assess     Body Fat Loss:  Unable to assess     Muscle Mass Loss:  Unable to assess    Fluid Accumulation:  Mild (Likely related to surgery) Extremities   Strength:       Nutrition Assessment:    Patient reports appetite is good, consumes % what she orders. Orders small portions or sometimes only single item for meals. Patient said she is not big eater, drinks one Ensure supplement at home. Will start Ensure 1x/day. Nutrition Related Findings:    Edema: +2 pitting RLE, LLE. Bowel sounds active. Last BM 9/21. Labs and meds reviewed. Wound Type: Multiple, Surgical Incision       Current Nutrition Intake & Therapies:    Average Meal Intake: 51-75%, %  Average Supplements Intake: None Ordered  ADULT DIET; Regular    Anthropometric Measures:  Height: 5' 3\" (160 cm)  Ideal Body Weight (IBW): 115 lbs (52 kg)    Admission Body Weight: 134 lb 7.7 oz (61 kg)  Current Body Weight: 125 lb 4 oz (56.8 kg) (Not sure if weights are correct. 9 lbs weight loss in 6 days unlikely), 116.9 % IBW.  Weight Source: Not Specified  Current BMI (kg/m2): 22.2  Usual Body Weight: 115 lb (52.2 kg) (per pt; states wt had dropped down to 107lb recently)  % Weight Change (Calculated): 16.9                    BMI Categories: Normal Weight (BMI 22.0 to 24.9) age over 72    Estimated Daily Nutrient Needs:  Energy Requirements Based On: Formula  Weight Used for Energy Requirements: Admission  Energy (kcal/day): 4036-7127 based on [facility] with 1.2-1.3 factor  Weight Used for Protein Requirements: Admission  Protein (g/day): 79-92 based on 1.3-1.5 gm per kg       Nutrition Diagnosis:   Increased nutrient needs related to  (healing) as evidenced by wounds    Nutrition Interventions:   Food and/or Nutrient Delivery: Continue Current Diet, Start Oral Nutrition Supplement  Nutrition Education/Counseling: Education not indicated  Coordination of Nutrition Care: Continue to monitor while inpatient       Goals:     Goals: PO intake 75% or greater       Nutrition Monitoring and Evaluation:   Behavioral-Environmental Outcomes: None Identified  Food/Nutrient Intake Outcomes: Food and Nutrient Intake, Supplement Intake  Physical Signs/Symptoms Outcomes: Biochemical Data, Fluid Status or Edema, Skin, Weight    Discharge Planning:    Continue current diet, Continue Oral Nutrition Supplement       Some areas of assessment may be incomplete due to COVID-19 precautions    Daniel Lomax RD, LD  Office phone [phone]

## 2022-09-22 NOTE — PROGRESS NOTES
[facility] Internal Medicine    Progress note            Date:   9/18/2022  Patient name:  Rashard Delgado  Date of admission:  9/15/2022  2:31 PM  MRN:   [id number]  Account:  [de-identified]  YOB: 1945  PCP:    Don Gao DO  Room:   2617/2617-01  Code Status:    Full Code    Physician Requesting Consult: Carlos Enrique Wiggins MD    Reason for Consult:  Medical management    Chief Complaint:     No chief complaint on file. Debility secondary to right hip surgery    History Obtained From:     Patient, EMR, nursing staff    History of Present Illness:     51-year-old female admitted from outside hospital after ORIF for right hip fracture performed on 9/9.   Course complicated by anemia hemoglobin to 5.9 status post transfusion medical history significant for Sjogren's for which she is taking Plaquenil, CKD stage III antiphospholipid antibody syndrome, history of PE patient has IVC filter not on any AC      Past Medical History:     Past Medical History:   Diagnosis Date    CKD (chronic kidney disease) stage 3, GFR 30-59 ml/min     Dehydration     Dysphagia     GERD (gastroesophageal reflux disease)     [place] filter in place     History of DVT (deep vein thrombosis)     Hx: UTI (urinary tract infection)     Hypertension     Hyperthyroidism 1995    XRT    Hyperthyroidism     Hypothyroidism     due to Rx for Graves disease    Iron deficiency anemia     Lupus (HCC)     Metabolic acidosis     Pharyngitis     Pulmonary embolism (HCC)     Pyelonephritis     Pyelonephritis     Renal abscess     Right kidney mass     Seizures ([facility] [address]     SLE ([place] encephalitis)         Past Surgical History:     Past Surgical History:   Procedure Laterality Date    APPENDECTOMY      BUNIONECTOMY      BUNIONECTOMY      COLONOSCOPY      HYSTERECTOMY      REVISION COLOSTOMY      VENA CAVA FILTER PLACEMENT          Medications Prior to Admission:     Prior to Admission medications Medication Sig Start Date End Date Taking? Authorizing Provider   calcium carbonate 600 MG TABS tablet Take 650 mg by mouth Twice a Week   Yes Historical Provider, MD   vitamin D (CHOLECALCIFEROL) 25 MCG (1000 UT) TABS tablet Take 1,000 Units by mouth daily   Yes Historical Provider, MD   nicotine (NICODERM CQ) 7 MG/24HR Place 1 patch onto the skin every 24 hours   Yes Historical Provider, MD   LACTOBACILLUS ACID-PECTIN PO Take 100 mg by mouth 3 times daily (with meals)   Yes Historical Provider, MD   cephALEXin (KEFLEX) 500 MG capsule Take 500 mg by mouth in the morning and at bedtime For 4 days   Yes Historical Provider, MD   dextromethorphan-guaiFENesin (MUCINEX DM)  MG per extended release tablet Take 2 tablets by mouth daily Reports drainage from her sinuses   Yes Historical Provider, MD   promethazine (PHENERGAN) 12.5 MG tablet Take 12.5 mg by mouth every 8 hours as needed for Nausea   Yes Historical Provider, MD   triamcinolone (KENALOG) 0.025 % ointment Apply 1 Applicatorful topically 2 times daily Apply topically 2 times daily. Yes Historical Provider, MD   hydroxychloroquine (PLAQUENIL) 200 MG tablet Take by mouth daily    Historical Provider, MD   aspirin (ASPIR-LOW) 81 MG EC tablet Take 1 tablet by mouth daily. 10/22/14   Lisha Garcia DPM   levothyroxine (LEVOTHROID) 112 MCG tablet Take 1 tablet by mouth Daily. 10/22/14   Lorence Klinefelter, DO   b complex vitamins capsule Take 1 capsule by mouth Twice a Week    Historical Provider, MD        Allergies:     Ertapenem, Codeine, and Nubain [nalbuphine hcl]    Social History:     Tobacco:    reports that she quit smoking about 12 years ago. She has a 18.50 pack-year smoking history. She has never used smokeless tobacco.  Alcohol:      reports current alcohol use. Drug Use:  reports no history of drug use.     Family History:     Family History   Problem Relation Age of Onset    High Blood Pressure Mother     Stroke Mother     Substance Abuse Mother     Cancer Father     Heart Disease Father     High Blood Pressure Sister     Substance Abuse Brother     Stroke Maternal Grandmother     Stroke Paternal Grandfather        Review of Systems:     Positive and Negative as described in HPI. CONSTITUTIONAL:  negative for fevers, chills, sweats, fatigue, weight loss  HEENT:  negative for vision, hearing changes, runny nose, throat pain  RESPIRATORY:  negative for shortness of breath, cough, congestion, wheezing. CARDIOVASCULAR:  negative for chest pain, palpitations. GASTROINTESTINAL:  negative for nausea, vomiting, diarrhea, constipation, change in bowel habits, abdominal pain   GENITOURINARY:  negative for difficulty of urination, burning with urination, frequency   INTEGUMENT:  negative for rash, skin lesions, easy bruising   HEMATOLOGIC/LYMPHATIC:  negative for swelling/edema   ALLERGIC/IMMUNOLOGIC:  negative for urticaria , itching  ENDOCRINE:  negative increase in drinking, increase in urination, hot or cold intolerance  MUSCULOSKELETAL:  post op right  hip pain, negative joint pains, muscle aches, swelling of joints  NEUROLOGICAL:  negative for headaches, dizziness, lightheadedness, numbness, pain, tingling extremities      Physical Exam:     BP (!) 140/75   Pulse 61   Temp 97.7 °F (36.5 °C)   Resp 16   Ht 5' 3\" (1.6 m)   Wt 125 lb 4 oz (56.8 kg)   SpO2 100%   BMI 22.19 kg/m²   Temp (24hrs), Av.9 °F (36.6 °C), Min:97.7 °F (36.5 °C), Max:98.1 °F (36.7 °C)        General Appearance:  alert, well appearing, and in no acute distress  Head:  normocephalic, atraumatic.   Eye: no icterus, redness, pupils equal and reactive, extraocular eye movements intact, conjunctiva clear  Ear: normal external ear, no discharge, hearing intact  Nose:  no drainage noted  Mouth: mucous membranes moist  Neck: supple, no carotid bruits, thyroid not palpable  Lungs: Bilateral equal air entry, clear to ausculation, no wheezing, rales or rhonchi, normal effort  Cardiovascular: normal rate, regular rhythm, no murmur, gallop, rub. Abdomen: Soft, nontender, nondistended, normal bowel sounds, no hepatomegaly or splenomegaly  Neurologic: There are no new focal motor or sensory deficits, normal muscle tone and bulk, no abnormal sensation, normal speech, cranial nerves II through XII grossly intact  Skin: No gross lesions, rashes, bruising or bleeding on exposed skin area  Extremities: right hip pain and restriction ROM,  peripheral pulses palpable, no pedal edema or calf pain with palpation  Psych: normal affect    Investigations:      Laboratory Testing:  No results found for this or any previous visit (from the past 24 hour(s)). Imaging/Diagonstics:  Recent data reviewed    Assessment :      Primary Problem  Fracture of hip, closed, right, sequela    Active Hospital Problems    Diagnosis Date Noted    Fracture of hip, closed, right, sequela [S72.001S] 09/15/2022     Priority: Medium       Plan:     Debility secondary to right hip surgery ORIF for fracture-continue with physical therapy  Sjogren syndrome-continue with Plaquenil  CKD stage III-creatinine monitoring, 1.02 on admission  Acute blood loss anemia noted postop-will monitor hemoglobin.  9.0 today on admission  Hypothyroid-resume Synthroid  UTI-complete course of Keflex  Mild hyponatremia-monitor sodium; 131 on admission  History of PE, APL antibody-IVC filter in situ    DVT prophylaxis-mechanical only      9/20  Patient seen and examined, participating in physical therapy no new issues  Continue current management  Will repeat labs to monitor sodium, hemoglobin    9/21  Sodium 130 today stable from 131 at presentation  Hemoglobin 8.7  Creatinine stable  Swelling right leg with calf tenderness-we will get leg vein Dopplers  No significant swelling suggestive of hematoma at the operative site    9/22  No DVT on leg vein dopplers  Pt otherwise doing well  Compression hosiery for leg

## 2022-09-22 NOTE — PROGRESS NOTES
[address]    Acute Rehabilitation Occupational Therapy Daily Treatment Note    Date: 22  Patient Name: Jina Cheema       Room: 7082/3057-28  MRN: [id number]  Account: [de-identified]   : 1945  (78 y.o.) Gender: female       Referring Practitioner: Elsie Lagos MD  Diagnosis: Fracture of hip, closed, right, sequela       Treatment Diagnosis: Impaired self care status    Past Medical History:  has a past medical history of CKD (chronic kidney disease) stage 3, GFR 30-59 ml/min, Dehydration, Dysphagia, GERD (gastroesophageal reflux disease), [place] filter in place, History of DVT (deep vein thrombosis), Hx: UTI (urinary tract infection), Hypertension, Hyperthyroidism, Hyperthyroidism, Hypothyroidism, Iron deficiency anemia, Lupus ([address] Metabolic acidosis, Pharyngitis, Pulmonary embolism ([address] Pyelonephritis, Pyelonephritis, Renal abscess, Right kidney mass, Seizures ([address] and SLE ([address] encephalitis). Past Surgical History:   has a past surgical history that includes Vena Cava Filter Placement; Hysterectomy; Bunionectomy; Appendectomy; Colonoscopy; Bunionectomy; and Revision Colostomy. Restrictions  Restrictions/Precautions  Restrictions/Precautions: Weight Bearing, Fall Risk, General Precautions, Up as Tolerated  Required Braces or Orthoses?: No  Implants present? : Metal implants (R ERNESTO)     Position Activity Restriction  Other position/activity restrictions: RLE FWBAT  Lower Extremity Weight Bearing Restrictions  Right Lower Extremity Weight Bearing: Weight Bearing As Tolerated (FWBAT)     Subjective     Pain Assessment  Pain Level: 8  Pain Location: Leg  Pain Orientation: Right  Pain Descriptors: Aching    Objective  Cognition  Overall Orientation Status: Within Functional Limits     Cognition  Overall Cognitive Status: WFL    Activities of Daily Living  Feeding  Assistance Level:  Independent    Grooming/Oral Hygiene  Assistance Level: Set-up    Upper Extremity Bathing  Assistance Level: Set-up  Skilled Clinical Factors: seated on shower bench    Lower Extremity Bathing  Equipment Provided: Long-handled sponge  Assistance Level: Set-up  Skilled Clinical Factors: seated on tub bench throughout including washing bottom and suzi    Upper Extremity Dressing  Assistance Level: Set-up  Skilled Clinical Factors: doffing/donning OH shirt while seated. Lower Extremity Dressing  Assistance Level: Stand by assist    Putting On/Taking Off Footwear  Assistance Level: Minimal assistance  Skilled Clinical Factors: TA for TEDs, pt able to doff/aide footies. Toileting  Assistance Level: Stand by assist    Toilet Transfers  Equipment: Grab bars;Standard toilet  Assistance Level: Stand by assist    Tub/Shower Transfers  Type: Shower  Transfer From: Rolling walker  Transfer To: Tub transfer bench  Additional Factors: Verbal cues;Cues for hand placement; With handrails  Assistance Level: Contact guard assist  Skilled Clinical Factors: Initially SBA however slight posterior LOB; CGA for safety over incline/small threshold into shower. SBA for exiting shower. Mobility  Bed Mobility  Overall Assistance Level: Supervision       Supine to Sit, sit to supine  Assistance Level: Supervision  Skilled Clinical Factors: HOB elevated and bed rails used          Sit to Stand  Assistance Level: Stand by assist  Stand to Sit  Assistance Level: Stand by assist     Functional Mobility  Device: Rolling walker  Activity: To/From bathroom  Assistance Level: Stand by assist          OT Exercises  Exercise Treatment: PM: BUE exercises with 1#-3#  (flucuates with plane) weight facilitated in all tolerated planes for 25 reps to support strength and endurance needed to engage in functional tasks safely and indep.  Pt akilah DOWLING tolerance with activity  Resistive Exercises: PM: pt tolerated x10 min of arm bike exercises to increase BUE strength, endurance, and ROM to perform functional tasks of daily living. pt completed exercises without rest breaks and from seated position. forward/backward movement. Dynamic Standing Balance Exercises: 1-2 min standing trials throughout ADL session, x1 slight LOB noted (see shower transfer)    Assessment  Assessment  Activity Tolerance: Patient tolerated treatment well (at 2 pm nsg states to hold therapy due to need for doppler.)  Discharge Recommendations: Home with assist PRN;Patient would benefit from continued therapy after discharge    Patient Education  Education  Education Given To: Patient  Education Provided: Role of Therapy;Plan of Care;ADL Function;Transfer Training; Safety;Equipment; Fall Prevention Strategies  Education Provided Comments: educ shower safety; non slip mat, shower seat, grab bars. Education Method: Verbal  Barriers to Learning: None  Education Outcome: Verbalized understanding;Demonstrated understanding    OT Equipment Recommendations  Equipment Needed: Yes  ADL Assistive Devices: Shower Chair with back; Reacher;Long-handled Sponge  Other: TBD    Safety Devices  Safety Devices in place: Yes  Type of devices: Left in chair;Gait belt       Goals  Patient Goals   Patient goals : \"To get as much accomplished as I can and to come out stronger to keep up with my jobs, my grandchildren, and the little ones\"  Short Term Goals  Time Frame for Short term goals: One week  Short Term Goal 1: Pt will complete LB dressing/bathing/toileting tasks with CGA and Good safety to improve IND and safety with self care tasks. Short Term Goal 2: Pt will complete UB dressing/bathing tasks with supervision and Good safety to improve IND and safety with self care tasks. Short Term Goal 3: Pt will complete functional transfers/mobility with CGA and Good safety to improve IND and safety with self care and mobility tasks.   Short Term Goal 4: Pt will tolerate standing for 6+ minutes during functional activity of choice with CGA and Good safety to improve IND and safety with self care and mobility tasks. Short Term Goal 5: Pt will verbalize/demonstrate Good understanding of at least 3 AD/DME/modified techniques to improve IND and safety with self care and mobility tasks. Additional Goals?: Yes  Short Term Goal 6: OTR/EM to further assess FMC/ strength. Short Term Goal 7: Pt will participate in 30+ minutes of therapeutic exercises/functional activities to improve IND and safety with self care and mobility tasks. Long Term Goals  Time Frame for Long term goals : By discharge  Long Term Goal 1: Pt will complete BADLs with modified IND and Good safety  to improve IND and safety with self care tasks. Long Term Goal 2: Pt will complete functional transfers/mobility with modified IND and Good safety to improve IND and safety with self care and mobility tasks. Long Term Goal 3: Pt will tolerate standing for 12+ minutes during functional activity of choice with modified IND and Good safety to improve IND and safety with self care and mobility tasks. Long Term Goal 4: Pt will verbalize/demonstrate Good understanding of at least 3 fall prevention/home safety strategies to improve IND and safety with self care and mobility tasks. Long Term Goal 5: Pt will complete medication management task with 100% accuracy with use of adaptive strategies to improve safety. Additional Goals?: Yes  Long Term Goal 6: Pt will complete light housekeeping/meal preparation task with supervision and Good safety to improve IND and safety with ADL/IADL tasks.     Plan  Plan  Times per Week: 5-7  Times per Day: Twice a day  Current Treatment Recommendations: Strengthening, ROM, Balance training, Functional mobility training, Endurance training, Pain management, Safety education & training, Patient/Caregiver education & training, Equipment evaluation, education, & procurement, Self-Care / ADL, Return to work related activity, Coordination training      OT Individual Minutes  Time In: 6961,3776  Time Out: 0900, 1440  Minutes: 60,40    Total minutes: 100         Electronically signed by AMBER Carranza on 9/22/22 at 3:12 PM EDT

## 2022-09-22 NOTE — PROGRESS NOTES
Physical Medicine & Rehabilitation  Progress Note      Subjective:      78year-old female with R hip fracture treated with IM nail. Patient is doing well today. She reports pain is controlled on current medication regimen. No new issues with sleep, appetite, bowel, or bladder. Continued weakness RLE which is stable. ROS:  Denies fevers, chills, sweats. No chest pain, palpitations, lightheadedness. Denies coughing, wheezing or shortness of breath. Denies abdominal pain, nausea, diarrhea or constipation. No new areas of joint pain. Denies new areas of numbness or weakness. Denies new anxiety or depression issues. No new skin problems. Rehabilitation:   Progressing in therapies. PT:    Bed mobility  Bridging: Stand by assistance  Rolling to Left: Minimal assistance  Rolling to Right: Minimal assistance  Supine to Sit: Stand by assistance  Sit to Supine: Contact guard assistance  Scooting: Independent  Bed Mobility Comments: patient prefers self assisting RLE into bed with UE support vs AD         Transfers  Sit to Stand: Contact guard assistance (intermittent SBA)  Stand to sit: Contact guard assistance (intermittent SBA)  Stand Pivot Transfers: Contact guard assistance  Comment: Patient had LOB during stand to sit transfer causing her to plop on the bed pm         Ambulation  Surface: level tile  Device: Rollator  Other Apparatus:  (Writer Follows with W/C)  Assistance: Stand by assistance  Quality of Gait: Flexed posture, decreased STEVE, decreased step length  Gait Deviations: Slow Natalya, Decreased step length, Decreased step height  Distance: 2MWT: 160' and finished a total of 249' in 3min and 14sec.   Comments: fatigue with gait distance  More Ambulation?: Yes  Ambulation 2  Surface - 2: ramp  Device 2: Rolling Walker  Assistance 2: Stand by assistance  Quality of Gait 2: Flexed posture, decreased step length, decreased STEVE  Gait Deviations: Decreased step length, Slow Natalya  Distance: 28 ft  Comments: Patient needed VC's to stand tall and bring the RW closer to patients body. OT:  Grooming/Oral Hygiene  Assistance Level: Stand by assist  Skilled Clinical Factors: standing at sink to brush dentures. Upper Extremity Bathing  Assistance Level: Set-up  Skilled Clinical Factors: seated on shower bench  Lower Extremity Bathing  Equipment Provided: Long-handled sponge  Assistance Level: Set-up  Skilled Clinical Factors: seated on tub bench throughout including washing bottom and suzi  Upper Extremity Dressing  Assistance Level: Set-up  Skilled Clinical Factors: doffing/donning OH shirt while seated. Lower Extremity Dressing  Equipment Provided: Reachers  Assistance Level: Stand by assist  Skilled Clinical Factors: underpants and pants. SBA standing to pull over hips. Putting On/Taking Off Footwear  Equipment Provided: Reachers, Sock aid  Assistance Level: Minimal assistance  Skilled Clinical Factors: TA for TEDs, doffing footies with reacher, use of sock aid to donning footies  Toileting  Assistance Level: Stand by assist  Skilled Clinical Factors: seated lean for hygiene post BM. Toilet Transfers  Technique:  (ambulating with RW)  Equipment: Grab bars, Standard toilet  Additional Factors: Verbal cues  Assistance Level: Stand by assist  Skilled Clinical Factors: no LOB observed      SPEECH:      Objective:  BP (!) 140/75   Pulse 61   Temp 97.7 °F (36.5 °C)   Resp 16   Ht 5' 3\" (1.6 m)   Wt 125 lb 4 oz (56.8 kg)   SpO2 100%   BMI 22.19 kg/m²       GEN: well developed, well nourished, NAD  HEENT: NCAT, PERRL, EOMI, mucous membranes pink and moist  CV: bradycardic rate regular rhythm, no murmurs, rubs or gallops  PULM: CTAB, no rales or rhonchi. Respirations WNL and unlabored  ABD: soft, NT, ND, BS+ and equal  NEURO: A&O x3. Sensation intact to light touch. MSK: Functional ROM BUEs and LLE. Weakness impairs ROM and strength RLE. Strength 5/5 key muscles BUEs and LLE.  R hip flexion 2/5, R knee extension 4+/5. EXTREMITIES: No calf tenderness to palpation bilaterally. Post op edema proximal RLE. Non pitting edema LLE. SKIN: warm dry and intact with good turgor. R lateral hip incisions well approximated with no erythema, induration. Some scant serosanguinous drainage. PSYCH: appropriately interactive. Affect WNL. Diagnostics:     CBC:   Recent Labs     09/20/22  1519   WBC 4.3   RBC 2.86*   HGB 8.7*   HCT 26.0*   MCV 90.9   RDW 16.9*        BMP:   Recent Labs     09/20/22  1519   *   K 4.0      CO2 16*   BUN 22   CREATININE 1.09*   GLUCOSE 82     BNP: No results for input(s): BNP in the last 72 hours. PT/INR: No results for input(s): PROTIME, INR in the last 72 hours. APTT: No results for input(s): APTT in the last 72 hours. CARDIAC ENZYMES: No results for input(s): CKMB, CKMBINDEX, TROPONINT in the last 72 hours. Invalid input(s): CKTOTAL;3 troponins   FASTING LIPID PANEL:  Lab Results   Component Value Date    CHOL 142 10/21/2014    HDL 58 10/21/2014    TRIG 62 10/21/2014     LIVER PROFILE: No results for input(s): AST, ALT, ALB, BILIDIR, BILITOT, ALKPHOS in the last 72 hours. BLE DOPPLER 9/21/22  Summary        Bilateral:    No evidence of deep or superficial venous thrombosis.        Current Medications:   Current Facility-Administered Medications: oxyCODONE-acetaminophen (PERCOCET) 5-325 MG per tablet 1 tablet, 1 tablet, Oral, Q4H PRN  enoxaparin (LOVENOX) injection 40 mg, 40 mg, SubCUTAneous, Daily  amLODIPine (NORVASC) tablet 5 mg, 5 mg, Oral, Daily  acetaminophen (TYLENOL) tablet 650 mg, 650 mg, Oral, Q4H PRN  polyethylene glycol (GLYCOLAX) packet 17 g, 17 g, Oral, Daily  senna (SENOKOT) tablet 17.2 mg, 2 tablet, Oral, Daily PRN  bisacodyl (DULCOLAX) suppository 10 mg, 10 mg, Rectal, Daily PRN  lactobacillus (CULTURELLE) capsule 1 capsule, 1 capsule, Oral, TID WC  aspirin chewable tablet 81 mg, 81 mg, Oral, Daily  ascorbic acid (VITAMIN C) tablet 500 mg, 500 mg, Oral, Once per day on Mon Thu  Vitamin D (CHOLECALCIFEROL) tablet 1,000 Units, 1,000 Units, Oral, Daily  hydroxychloroquine (PLAQUENIL) tablet 200 mg, 200 mg, Oral, Daily  levothyroxine (SYNTHROID) tablet 112 mcg, 112 mcg, Oral, Daily  guaiFENesin (MUCINEX) extended release tablet 1,200 mg, 1,200 mg, Oral, Daily  nicotine (NICODERM CQ) 7 MG/24HR 1 patch, 1 patch, TransDERmal, Daily  promethazine (PHENERGAN) tablet 12.5 mg, 12.5 mg, Oral, Q8H PRN  calcium elemental (OSCAL) tablet 500 mg, 500 mg, Oral, Once per day on Mon Thu  fluocinonide (LIDEX) 0.05 % cream, , Topical, BID      Impression/Plan:   Impaired ADLs, gait, and mobility due to: Fall with R hip fracture: s/p IM nail. PT/OT  for gait, mobility, strengthening, endurance, ADLs, and self care. Has Percocet prn pain. WBAT. On Calcium/vitamin D for bone healing. POD #14  - Dr. Ludwig Caraballo office approved removal of staples. UTI: completed Keflex 9/19/22. Blood loss anemia: Hb low but stable. Monitoring  HTN: controlled without medication. Hypothyroidism: on levothyroxine  Nicotine dependence: on Nicoderm  Hyponatremia: Monitoring Na. IM following. Chronic sinusitis: on high dose Mucinex at home  CKD: Monitoring renal function  Lupus: on Plaquenil. Antiphospholipid antibody syndrome/Hx PE: has sissy filter. On Lovenox  Bowel Management: Miralax daily, Senokot prn, Dulcolax prn  Internal medicine for medical management  DVT prophylaxis:  on Lovenox for 4 weeks post-op. BLE doppler negative 9/21  Achieving therapy goals. Anticipate discharge home tomorrow with home health. Follow up Dr. Jose Gregory 1 week, PCP 1-2 weeks.      Patient is seen in face to face evaluation today and will require the following durable medical equipment     BATH/SHOWER SEAT MISC    Bath/Shower Bench with back    Weight: Weight: 125 lb 4 oz (56.8 kg)  Diagnosis: R hip fracture  Duration: Purchase       Electronically signed by Soco Wilburn MD on 9/22/2022 at 9:50 AM      This note is created with the assistance of a speech recognition program.  While intending to generate a document that actually reflects the content of the visit, the document can still have some errors including those of syntax and sound a like substitutions which may escape proof reading. In such instances, actual meaning can be extrapolated by contextual diversion.

## 2022-09-22 NOTE — DISCHARGE INSTR - COC
Continuity of Care Form    Patient Name: Jina Cheema   :  1945  MRN:  [id number]    Admit date:  9/15/2022  Discharge date:  22      Code Status Order: Full Code   Advance Directives:     Admitting Physician:  Elsie Lagos MD  PCP: Dc Miller DO    Discharging Nurse: Charisse Amaro Unit/Room#: 7147/8779-58  Discharging Unit Phone Number: [phone]      Emergency Contact:   Extended Emergency Contact Information  Primary Emergency Contact: [facility] Phone: [phone]  Relation: Child    Past Surgical History:  Past Surgical History:   Procedure Laterality Date    APPENDECTOMY      BUNIONECTOMY      BUNIONECTOMY      COLONOSCOPY      HYSTERECTOMY      REVISION COLOSTOMY      VENA CAVA FILTER PLACEMENT         Immunization History:   Immunization History   Administered Date(s) Administered    COVID-19, PFIZER PURPLE top, DILUTE for use, (age 15 y+), 30mcg/0.3mL 2021, 2021, 2021       Active Problems:  Patient Active Problem List   Diagnosis Code    Lupus ([address] M32.9    Edema R60.9    Hyperthyroidism E05.90    Pulmonary embolism ([address] I26.99    CKD (chronic kidney disease) stage 3, GFR 30-59 ml/min (HCC) N18.30    Dysphagia R13.10    GERD (gastroesophageal reflux disease) K21.9    Right kidney mass N28.89    Hypertension I10    Hypothyroidism E03.9    Sjoegren syndrome M35.00    Hyponatremia E87.1    Seizure ([address] R56.9    Seizure disorder ([address] G40.909    Fracture of hip, closed, right, sequela S72.001S       Isolation/Infection:   Isolation            Contact          Patient Infection Status       Infection Onset Added Last Indicated Last Indicated By Review Planned Expiration Resolved Resolved By    MDRO (multi-drug resistant organism)  19 JORDIN Sims- urine-2020    ESBL (Extended Spectrum Beta Lactamase) 19 Urine culture clean catch        2019 urine            Nurse Assessment:  Last Vital Signs: BP (!) 140/75   Pulse 61   Temp 97.7 °F (36.5 °C)   Resp 16   Ht 5' 3\" (1.6 m)   Wt 125 lb 4 oz (56.8 kg)   SpO2 100%   BMI 22.19 kg/m²     Last documented pain score (0-10 scale): Pain Level: 8  Last Weight:   Wt Readings from Last 1 Encounters:   09/18/22 125 lb 4 oz (56.8 kg)     Mental Status:  oriented, alert, coherent, logical, thought processes intact, and able to concentrate and follow conversation    IV Access:  - None    Nursing Mobility/ADLs:  Walking   Independent  Transfer  Independent  Bathing  Assisted  Dressing  Assisted  Toileting  Assisted  Feeding  Independent  Med Admin  Assisted  Med Delivery   whole    Wound Care Documentation and Therapy:  Incision 09/15/22 Hip Anterior;Right;Upper (Active)   Dressing Status Dry; Intact; Clean 09/22/22 0753   Dressing Change Due 09/22/22 09/22/22 0753   Incision Cleansed Soap and water 09/20/22 1933   Dressing/Treatment Foam;Other (comment) 09/20/22 1933   Closure Staples 09/20/22 1933   Margins Approximated 09/20/22 1933   Incision Assessment Dry 09/21/22 0800   Drainage Amount Scant 09/20/22 1933   Drainage Description Serous 09/20/22 1933   Odor None 09/21/22 0800   Brittney-incision Assessment Intact 09/20/22 1933   Number of days: 6       Incision 09/15/22 Hip Anterior;Right (Active)   Dressing Status Clean; Intact;Dry 09/22/22 0753   Dressing Change Due 09/22/22 09/22/22 0753   Incision Cleansed Soap and water 09/21/22 0800   Dressing/Treatment Foam;Other (comment) 09/21/22 0800   Closure Staples 09/21/22 0800   Margins Approximated 09/21/22 0800   Incision Assessment Dry 09/21/22 0800   Drainage Amount None 09/20/22 1933   Drainage Description Yellow 09/20/22 1134   Odor None 09/21/22 0800   Brittney-incision Assessment Intact 09/20/22 1933   Number of days: 6        Elimination:  Continence:    Bowel: Yes  Bladder: Yes  Urinary Catheter: None   Colostomy/Ileostomy/Ileal Conduit: No       Date of Last BM: 9/22/22  No intake or output data in the 24 hours ending 09/22/22 1322  No intake/output data recorded. Safety Concerns: At Risk for Falls    Impairments/Disabilities:      None    Nutrition Therapy:  Current Nutrition Therapy:   - Oral Diet:  General    Routes of Feeding: Oral  Liquids: Thin Liquids  Daily Fluid Restriction: no  Last Modified Barium Swallow with Video (Video Swallowing Test): not done    Treatments at the Time of Hospital Discharge:   Respiratory Treatments: na  Oxygen Therapy:  is not on home oxygen therapy. Ventilator:    - No ventilator support    Rehab Therapies: Physical Therapy and Occupational Therapy  Weight Bearing Status/Restrictions: No weight bearing restrictions  Other Medical Equipment (for information only, NOT a DME order):  ***  Other Treatments: ***    Patient's personal belongings (please select all that are sent with patient):  Clothing cell phone    RN SIGNATURE:  Electronically signed by Kenyetta Sears RN on 9/23/22 at 10:58 AM EDT    CASE MANAGEMENT/SOCIAL WORK SECTION    Inpatient Status Date: 09/15/22    Readmission Risk Assessment Score:  Readmission Risk              Risk of Unplanned Readmission:  13           Discharging to Facility/ . [address]  [address] [address]  Phone [phone]  Fax  [phone]         Dialysis Facility (if applicable)     / signature: Electronically signed by Kenyetta Sears RN on 9/23/22 at 10:57 AM EDT    PHYSICIAN SECTION    Prognosis: Fair    Condition at Discharge: Stable    Rehab Potential (if transferring to Rehab): Fair    Recommended Labs or Other Treatments After Discharge: PT/OT to eval and treat. Nursing for medication management, follow incisions.  CBC 1 week with results to PCP Dr. Sae Liu for anemia/low hemoglobin    Physician Certification: I certify the above information and transfer of Gus Garcia  is necessary for the continuing treatment of the diagnosis listed and that she requires Home Care for less 30 days.      Update Admission H&P: No change in H&P    PHYSICIAN SIGNATURE:  Electronically signed by Alisha Esparza MD on 9/22/22 at 1:22 PM EDT

## 2022-09-23 VITALS
SYSTOLIC BLOOD PRESSURE: 123 MMHG | RESPIRATION RATE: 16 BRPM | HEIGHT: 63 IN | BODY MASS INDEX: 22.19 KG/M2 | OXYGEN SATURATION: 99 % | DIASTOLIC BLOOD PRESSURE: 71 MMHG | HEART RATE: 56 BPM | TEMPERATURE: 97.7 F | WEIGHT: 125.25 LBS

## 2022-09-23 LAB
ABSOLUTE BANDS #: 0.2 K/UL (ref 0–1)
ABSOLUTE EOS #: 0.07 K/UL (ref 0–0.4)
ABSOLUTE LYMPH #: 0.29 K/UL (ref 1–4.8)
ABSOLUTE MONO #: 0.23 K/UL (ref 0.1–1.3)
ANION GAP SERPL CALCULATED.3IONS-SCNC: 10 MMOL/L (ref 9–17)
BANDS: 6 % (ref 0–10)
BASOPHILS # BLD: 0 % (ref 0–2)
BASOPHILS ABSOLUTE: 0 K/UL (ref 0–0.2)
BUN BLDV-MCNC: 20 MG/DL (ref 8–23)
CALCIUM SERPL-MCNC: 9 MG/DL (ref 8.6–10.4)
CHLORIDE BLD-SCNC: 103 MMOL/L (ref 98–107)
CO2: 17 MMOL/L (ref 20–31)
CREAT SERPL-MCNC: 1.07 MG/DL (ref 0.5–0.9)
EOSINOPHILS RELATIVE PERCENT: 2 % (ref 0–4)
GFR AFRICAN AMERICAN: >60 ML/MIN
GFR NON-AFRICAN AMERICAN: 50 ML/MIN
GFR SERPL CREATININE-BSD FRML MDRD: ABNORMAL ML/MIN/{1.73_M2}
GLUCOSE BLD-MCNC: 83 MG/DL (ref 70–99)
HCT VFR BLD CALC: 25.4 % (ref 36–46)
HEMOGLOBIN: 8.6 G/DL (ref 12–16)
LYMPHOCYTES # BLD: 9 % (ref 24–44)
MCH RBC QN AUTO: 30.5 PG (ref 26–34)
MCHC RBC AUTO-ENTMCNC: 33.9 G/DL (ref 31–37)
MCV RBC AUTO: 90.1 FL (ref 80–100)
METAMYELOCYTES ABSOLUTE COUNT: 0.03 K/UL
METAMYELOCYTES: 1 %
MONOCYTES # BLD: 7 % (ref 1–7)
MORPHOLOGY: ABNORMAL
MORPHOLOGY: ABNORMAL
MYELOCYTES ABSOLUTE COUNT: 0.03 K/UL
MYELOCYTES: 1 %
NUCLEATED RED BLOOD CELLS: 1 PER 100 WBC
PDW BLD-RTO: 17 % (ref 11.5–14.9)
PLATELET # BLD: 328 K/UL (ref 150–450)
PMV BLD AUTO: 6.7 FL (ref 6–12)
POTASSIUM SERPL-SCNC: 4.2 MMOL/L (ref 3.7–5.3)
RBC # BLD: 2.82 M/UL (ref 4–5.2)
SEG NEUTROPHILS: 74 % (ref 36–66)
SEGMENTED NEUTROPHILS ABSOLUTE COUNT: 2.42 K/UL (ref 1.3–9.1)
SODIUM BLD-SCNC: 130 MMOL/L (ref 135–144)
WBC # BLD: 3.3 K/UL (ref 3.5–11)

## 2022-09-23 PROCEDURE — 97116 GAIT TRAINING THERAPY: CPT

## 2022-09-23 PROCEDURE — 6360000002 HC RX W HCPCS: Performed by: PHYSICAL MEDICINE & REHABILITATION

## 2022-09-23 PROCEDURE — 6370000000 HC RX 637 (ALT 250 FOR IP): Performed by: PHYSICAL MEDICINE & REHABILITATION

## 2022-09-23 PROCEDURE — 99239 HOSP IP/OBS DSCHRG MGMT >30: CPT | Performed by: PHYSICAL MEDICINE & REHABILITATION

## 2022-09-23 PROCEDURE — 97530 THERAPEUTIC ACTIVITIES: CPT

## 2022-09-23 PROCEDURE — 99231 SBSQ HOSP IP/OBS SF/LOW 25: CPT | Performed by: INTERNAL MEDICINE

## 2022-09-23 PROCEDURE — 97535 SELF CARE MNGMENT TRAINING: CPT

## 2022-09-23 PROCEDURE — 36415 COLL VENOUS BLD VENIPUNCTURE: CPT

## 2022-09-23 PROCEDURE — 80048 BASIC METABOLIC PNL TOTAL CA: CPT

## 2022-09-23 PROCEDURE — 85025 COMPLETE CBC W/AUTO DIFF WBC: CPT

## 2022-09-23 PROCEDURE — 97110 THERAPEUTIC EXERCISES: CPT

## 2022-09-23 PROCEDURE — 6370000000 HC RX 637 (ALT 250 FOR IP): Performed by: INTERNAL MEDICINE

## 2022-09-23 RX ADMIN — Medication 1 CAPSULE: at 11:34

## 2022-09-23 RX ADMIN — OXYCODONE HYDROCHLORIDE AND ACETAMINOPHEN 1 TABLET: 5; 325 TABLET ORAL at 06:41

## 2022-09-23 RX ADMIN — OXYCODONE HYDROCHLORIDE AND ACETAMINOPHEN 1 TABLET: 5; 325 TABLET ORAL at 11:34

## 2022-09-23 RX ADMIN — GUAIFENESIN 1200 MG: 600 TABLET, EXTENDED RELEASE ORAL at 08:11

## 2022-09-23 RX ADMIN — Medication 1 CAPSULE: at 15:12

## 2022-09-23 RX ADMIN — Medication 1000 UNITS: at 08:11

## 2022-09-23 RX ADMIN — Medication 1 CAPSULE: at 08:11

## 2022-09-23 RX ADMIN — HYDROXYCHLOROQUINE SULFATE 200 MG: 200 TABLET ORAL at 08:12

## 2022-09-23 RX ADMIN — AMLODIPINE BESYLATE 5 MG: 5 TABLET ORAL at 08:11

## 2022-09-23 RX ADMIN — ASPIRIN 81 MG 81 MG: 81 TABLET ORAL at 08:11

## 2022-09-23 RX ADMIN — ENOXAPARIN SODIUM 40 MG: 100 INJECTION SUBCUTANEOUS at 08:11

## 2022-09-23 RX ADMIN — LEVOTHYROXINE SODIUM 112 MCG: 112 TABLET ORAL at 06:41

## 2022-09-23 RX ADMIN — OXYCODONE HYDROCHLORIDE AND ACETAMINOPHEN 1 TABLET: 5; 325 TABLET ORAL at 15:12

## 2022-09-23 RX ADMIN — FLUOCINONIDE: 0.5 CREAM TOPICAL at 08:16

## 2022-09-23 ASSESSMENT — PAIN SCALES - GENERAL
PAINLEVEL_OUTOF10: 10
PAINLEVEL_OUTOF10: 8

## 2022-09-23 ASSESSMENT — PAIN DESCRIPTION - DESCRIPTORS: DESCRIPTORS: ACHING

## 2022-09-23 ASSESSMENT — PAIN SCALES - WONG BAKER: WONGBAKER_NUMERICALRESPONSE: 2

## 2022-09-23 ASSESSMENT — PAIN DESCRIPTION - ORIENTATION: ORIENTATION: RIGHT

## 2022-09-23 ASSESSMENT — PAIN DESCRIPTION - LOCATION: LOCATION: HIP

## 2022-09-23 NOTE — PROGRESS NOTES
Physical Therapy  Facility/Department: [facility] ACUTE REHAB  Rehabilitation Physical Therapy     NAME: Chasity Townsend  : 1945 (78 y.o.)  MRN: [id number]  CODE STATUS: Full Code    Date of Service: 22      Past Medical History:   Diagnosis Date    CKD (chronic kidney disease) stage 3, GFR 30-59 ml/min     Dehydration     Dysphagia     GERD (gastroesophageal reflux disease)     Ignacio filter in place     History of DVT (deep vein thrombosis)     Hx: UTI (urinary tract infection)     Hypertension     Hyperthyroidism     XRT    Hyperthyroidism     Hypothyroidism     due to Rx for Graves disease    Iron deficiency anemia     Lupus (HCC)     Metabolic acidosis     Pharyngitis     Pulmonary embolism (HCC)     Pyelonephritis     Pyelonephritis     Renal abscess     Right kidney mass     Seizures ([address]     SLE ([place] encephalitis)      Past Surgical History:   Procedure Laterality Date    APPENDECTOMY      BUNIONECTOMY      BUNIONECTOMY      COLONOSCOPY      HYSTERECTOMY      REVISION COLOSTOMY      VENA CAVA FILTER PLACEMENT         Chart Reviewed: Yes  Patient assessed for rehabilitation services?: Yes  Additional Pertinent Hx: The patient is a 78 y.o. female, S/P mechanical fall at home with subsequent sustained R femur fracture, S/P Intramedullary trochanteric fixation nail to R Hip Performed by Dr. Ina Waldrop on 22  Family / Caregiver Present: No  Referring Practitioner: Dr. Alberto Zee  Referral Date : 09/15/22  Diagnosis: Closed R Hip Fracture; S/P R IM nailing 22    Restrictions:  Restrictions/Precautions: Weight Bearing; Fall Risk;General Precautions; Up as Tolerated  Lower Extremity Weight Bearing Restrictions  Right Lower Extremity Weight Bearing: Weight Bearing As Tolerated (FWBAT)  Position Activity Restriction  Other position/activity restrictions: RLE FWBAT     SUBJECTIVE  Subjective: Pt is pleasant and agreeable to PT. discussing dc planning home today.  Still feels unsure of herself. patient agreeable to preform tasks independently in room with gait. OBJECTIVE            Functional Mobility  Bed mobility  Bridging: Modified independent   Rolling to Left: Modified independent  Rolling to Right: Modified independent  Supine to Sit: Modified independent  Sit to Supine: Modified independent  Scooting: Modified independent  Bed Mobility Comments: flat bed no handrail. self assisting RLE into bed with UE support. Transfers  Sit to Stand: Modified independent  Stand to sit: Modified independent  Bed to Chair: Modified independent  Stand Pivot Transfers: Modified independent  Comment: Transfers completed with RW and Rollator this date. Good safety awareness noted. patient demonstrated transfers from varied surfaces and surface heights with rolling walker/rollator with and without chair arms. patient required BUE support with transfers. patient demonstrates need for rollator seat fatigue, limited endurance and safety concerns with unexpected onset of dizziness, of unknown cause. patient education provided to sit with onset of dizziness to prevent fall. patient verbalized and demonstrates good understanding    Environmental Mobility  Ambulation  Surface: level tile  Device: Rollator;Rolling Walker  Assistance: Modified Independent  Quality of Gait: Flexed posture with fatigue, decreased STEVE, decreased step length  Distance: 10 feet, 50 feet with 2 turns, 150 feet, 10 feet on uneven terrain demonstrated. Comments: fatigue with gait distance of 210 feet tolerated patient required seated rest break to continue activities.   Stairs  # Steps : 4  Rails: Right ascending  Curbs: 6\" (SBA to intermittent steadying assist to descend curb with rollator support)  Device: Rolling walker;4 wheeled walker  Assistance: Modified independent     PT Exercises  Exercise Equipment: Newser 13 minutes L4.    ASSESSMENT       Activity Tolerance  Activity Tolerance: Patient tolerated treatment well GOALS  Patient Goals   Patient goals : To walk better  Short Term Goals  Time Frame for Short term goals: 5 days  Short term goal 1: Pt to demo All bed mobility with CGA using Gait belt as leglift assist as needed  Short term goal 2: pt to demo all transfers with RW and SBA  Short term goal 3: pt to ambulate 150' with RW and SBA  Short term goal 4: pt to negotiate 5 steps with rails and AD as needed to allow for safe home access. Short term goal 5: pt to identify effective methods for reducing pain and swelling (including proper LE Elevation techinque/Icing Technique)  Additional Goals?: Yes  Short Term Goal 6: Pt to demonstrate good technique for HEP exercises to improve pt's independence with self-care exercise routine  Long Term Goals  Time Frame for Long term goals : Until D/C  Long term goal 1: Pt to demo All bed mobility independently using Gait belt as leglift assist as needed  Long term goal 2: pt to demo all transfers with RW Mod I  Long term goal 3: pt to ambulate 150' with RW Mod I on level surfaces, 10-30' ramped surface RW Supervision, and Outdoor terrain with RW and SBA  Long term goal 4: pt to negotiate 5 steps with rails, SBA, and AD as needed to allow for safe home access. Long term goal 5: pt to demo simulated or actual car transfer with safe technique, appropriate device, and SBA  Additional Goals?: Yes  Long term goal 6: Pt to Improve Distance on 2MWT By 113' to demo Minimal Detectable Change in walking speed. Goal is to walk 198-200' in 2 Minutes. PLAN OF CARE  Frequency: 1-2 treatment sessions per day, 5-7 days per week  Safety Devices  Type of Devices: Call light within reach    EDUCATION  Education  Education Given To: Patient  Education Provided: Equipment;DME/Home Modifications; Safety  Education Provided Comments: Educated patient on use of rollator, using the lock breaks to sit when dizziness or LOB presents. Curb step with rollator technique.   Education Method: Demonstration;Verbal  Barriers to Learning: None  Education Outcome: Verbalized understanding;Demonstrated understanding      Therapy Time   Individual Concurrent Group Co-treatment   Time In 0902         Time Out 1000         Minutes [address] [facility] ALEXIS Delvalle, 09/23/22 at 2:30 PM

## 2022-09-23 NOTE — DISCHARGE SUMMARY
Physical Medicine & Rehabilitation  Discharge Summary     Patient Identification:  Gus Garcia  : 1945  Admit date: 9/15/2022  Discharge date: 2022   Attending provider: Nicolette Chan MD      Discharging provider: Rita Erwin MD    Primary care provider: Gissell Lema DO     Discharge Diagnoses:   R hip fracture  UTI - resolved  Blood loss anemia  HTN  Hypothyroidism  Nicotine dependence  Hyponatremia  Chronic sinusitis  CKD  Lupus  Antiphospholipid Antibody Syndrome/Hx PE  BLE edema    Discharge Functional Status:    Physical Therapy:  Bed Mobility:   Bed mobility  Bridging: Modified independent   Rolling to Left: Modified independent  Rolling to Right: Modified independent  Supine to Sit: Modified independent  Sit to Supine: Modified independent  Scooting: Modified independent  Bed Mobility Comments: flat bed no handrail. self assisting RLE into bed with UE support. Transfers:   Transfers  Sit to Stand: Modified independent  Stand to sit: Modified independent  Bed to Chair: Modified independent  Stand Pivot Transfers: Modified independent  Comment: Transfers completed with RW and Rollator this date. Good safety awareness noted. patient demonstrated transfers from varied surfaces and surface heights with rolling walker/rollator with and without chair arms. patient required BUE support with transfers. patient demonstrates need for rollator seat fatigue, limited endurance and safety concerns with unexpected onset of dizziness, of unknown cause. patient education provided to sit with onset of dizziness to prevent fall.  patient verbalized and demonstrates good understanding         Mobility:   Ambulation  Surface: level tile  Device: Rollator  Other Apparatus:  (Writer Follows with W/C)  Assistance: Supervision (Mod I for shorter distances and Supervision for longer distances.)  Quality of Gait: Flexed posture, decreased STEVE, decreased step length  Gait Deviations: Slow Natalya, her purse with complaint of right hip pain. She was found to have a three-part distal right hip IT fracture with superior displacement, varus angulation avulsion fracture right lesser trochanter. She underwent IM nail fixation by Dr. Allison Zepeda on 9/9. She is weightbearing as tolerated right lower extremity. She had postop anemia hemoglobin 5.9 on 9/10 had 1 unit packed red blood cells and improved to 8.7 on 9/11. Creatinine improved to 1.36 1.2309/11. CKD stage III. She is on Plaquenil for history of Sjogren's. She is not on anticoagulation but has Abelardo filter for history of PE and  antiphospholipid antibody syndrome syndrome. Dr. Castro Armando on 9/11-not on chemical DVT prophylaxis because of low hemoglobin, will start if hemoglobin remained stable and if okay with orthopedics    Patient evaluated today:  GEN: well developed, well nourished, NAD  HEENT: NCAT, PERRL, EOMI, mucous membranes pink and moist  CV: bradycardic rate regular rhythm, no murmurs, rubs or gallops  PULM: CTAB, no rales or rhonchi. Respirations WNL and unlabored  ABD: soft, NT, ND, BS+ and equal  NEURO: A&O x3. Sensation intact to light touch. MSK: Functional ROM BUEs and LLE. Weakness impairs ROM and strength RLE. Strength 5/5 key muscles BUEs and LLE. R hip flexion 2/5, R knee extension 4+/5. EXTREMITIES: No calf tenderness to palpation bilaterally. Post op edema proximal RLE. Non pitting edema LLE. SKIN: warm dry and intact with good turgor. R lateral hip incisions well approximated with no erythema, induration. Some scant serosanguinous drainage. PSYCH: appropriately interactive. Affect WNL. Rehab course:   Pain was managed with prn Percocet. She is WBAT but with continued weakness RLE. UTI resolved and she completed Keflex on 9/19/22. Her hemoglobin was low but stable throughout admission. She was maintained on Lovenox for DVT risk during admission with plan for 4 weeks post-op treatment.  She had persistent edema BLEs which was evaluated by internal medicine. BLE doppler was negative on 9/21/22. Internal medicine recommended compression stockings for home. Chronic conditions were stable on home medications. The patient participated in an aggressive multidisciplinary inpatient rehabilitation program involving 3 hours per day, 5 days per week of rehabilitation. Patient benefited from inpatient rehab and was discharged in good stable condition. Consults:   Internal Medicine    Significant Diagnostics:   CBC:   Lab Results   Component Value Date/Time    WBC 3.3 09/23/2022 06:09 AM    RBC 2.82 09/23/2022 06:09 AM    RBC 4.21 03/20/2012 04:52 PM    HGB 8.6 09/23/2022 06:09 AM    HCT 25.4 09/23/2022 06:09 AM    MCV 90.1 09/23/2022 06:09 AM    MCH 30.5 09/23/2022 06:09 AM    MCHC 33.9 09/23/2022 06:09 AM    RDW 17.0 09/23/2022 06:09 AM     09/23/2022 06:09 AM     03/20/2012 04:52 PM    MPV 6.7 09/23/2022 06:09 AM     CMP:    Lab Results   Component Value Date/Time     09/23/2022 06:09 AM    K 4.2 09/23/2022 06:09 AM     09/23/2022 06:09 AM    CO2 17 09/23/2022 06:09 AM    BUN 20 09/23/2022 06:09 AM    CREATININE 1.07 09/23/2022 06:09 AM    GFRAA >60 09/23/2022 06:09 AM    LABGLOM 50 09/23/2022 06:09 AM    GLUCOSE 83 09/23/2022 06:09 AM    PROT 5.6 09/16/2022 06:25 AM    LABALBU 2.0 09/16/2022 06:25 AM    CALCIUM 9.0 09/23/2022 06:09 AM    BILITOT 0.4 09/16/2022 06:25 AM    ALKPHOS 61 09/16/2022 06:25 AM    AST 21 09/16/2022 06:25 AM    ALT 9 09/16/2022 06:25 AM         Patient Instructions:    Weight bearing as tolerated. Medications, precautions and follow up reviewed with patient and family    Follow-up visits: See after visit summary from hospitalization - PCP 1-2 weeks, Dr. Triny Gunderson as scheduled. CBC 1 week for low post-op hemoglobin with results to her PCP.      Disposition:  Home with home health    Discharge Medications:         Medication List        START taking these medications      amLODIPine 5 MG tablet  Commonly known as: NORVASC  Take 1 tablet by mouth daily     ascorbic acid 500 MG tablet  Commonly known as: VITAMIN C  Take 1 tablet by mouth Twice a Week  Start taking on: September 26, 2022     enoxaparin 40 MG/0.4ML  Commonly known as: LOVENOX  Inject 0.4 mLs into the skin daily     fluocinonide 0.05 % cream  Commonly known as: LIDEX  Apply topically 2 times daily. Handicap Placard Misc  by Does not apply route Duration: 3 months / Dx: R hip fracture    Unable to walk > 200 feet without assistive device     lactobacillus capsule  Take 1 capsule by mouth 3 times daily (with meals)     oxyCODONE-acetaminophen 5-325 MG per tablet  Commonly known as: PERCOCET  Take 1 tablet by mouth every 6 hours as needed for Pain for up to 7 days. polyethylene glycol 17 g packet  Commonly known as: GLYCOLAX  Take 17 g by mouth daily as needed for Constipation            CONTINUE taking these medications      aspirin 81 MG EC tablet  Commonly known as: Aspir-Low  Take 1 tablet by mouth daily. calcium carbonate 600 MG Tabs tablet     dextromethorphan-guaiFENesin  MG per extended release tablet  Commonly known as: MUCINEX DM     hydroxychloroquine 200 MG tablet  Commonly known as: PLAQUENIL     levothyroxine 112 MCG tablet  Commonly known as: Levothroid  Take 1 tablet by mouth Daily.      nicotine 7 MG/24HR  Commonly known as: NICODERM CQ     promethazine 12.5 MG tablet  Commonly known as: PHENERGAN     vitamin D 25 MCG (1000 UT) Tabs tablet  Commonly known as: CHOLECALCIFEROL            STOP taking these medications      b complex vitamins capsule     cephALEXin 500 MG capsule  Commonly known as: KEFLEX     LACTOBACILLUS ACID-PECTIN PO     triamcinolone 0.025 % ointment  Commonly known as: KENALOG               Where to Get Your Medications        These medications were sent to [facility] [address] [facility] [address] [facility]  [address] [facility]      Phone: [phone]   amLODIPine 5 MG tablet  enoxaparin 40 MG/0.4ML  fluocinonide 0.05 % cream  oxyCODONE-acetaminophen 5-325 MG per tablet       You can get these medications from any pharmacy    Bring a paper prescription for each of these medications  Handicap Placard [facility]  You don't need a prescription for these medications  ascorbic acid 500 MG tablet  lactobacillus capsule  polyethylene glycol 17 g packet        Time spent: 35 minutes        Bing Kerns MD

## 2022-09-23 NOTE — PROGRESS NOTES
Discharge instructions reviewed with patient all questions answered. All belonging with patient. Writer assisted pt to personal car. Pt transferred independently and tolerated well.

## 2022-09-23 NOTE — PROGRESS NOTES
09/23/22 1254   Encounter Summary   Encounter Overview/Reason  Volunteer Encounter   Service Provided For: Patient   Referral/Consult From: Rounding   Last Encounter  09/23/22  (V)   Complexity of Encounter Low   Spiritual/Emotional needs   Type Spiritual Support   Rituals, Rites and [address]

## 2022-09-23 NOTE — PROGRESS NOTES
Oriented X4         ADL  Feeding  Assistance Level: Independent  Skilled Clinical Factors: per pt report    Grooming/Oral Hygiene  Assistance Level: Set-up  Skilled Clinical Factors: standing at sink to brush dentures. Upper Extremity Bathing  Assistance Level: Set-up  Skilled Clinical Factors: seated on shower bench    Lower Extremity Bathing  Equipment Provided: Long-handled sponge  Assistance Level: Set-up  Skilled Clinical Factors: seated on tub bench throughout including washing bottom and suzi    Upper Extremity Dressing  Assistance Level: Set-up  Skilled Clinical Factors: doffing/donning [place] shirt while seated. Lower Extremity Dressing  Assistance Level: Stand by assist  Skilled Clinical Factors: pt demo figure 4 tech for underpants and pants. SBA standing to pull over hips. Putting On/Taking Off Footwear  Assistance Level: Stand by assist  Skilled Clinical Factors: pt able to doff/aide footies. Toileting  Assistance Level: Stand by assist  Skilled Clinical Factors: seated lean for hygiene post BM. Toilet Transfers  Equipment: Grab bars;Standard toilet  Additional Factors: Verbal cues  Assistance Level: Stand by assist  Skilled Clinical Factors: no LOB observed    Tub/Shower Transfers  Type: Shower  Transfer From: Rolling walker  Transfer To: Tub transfer bench  Additional Factors: Verbal cues;Cues for hand placement; With handrails  Assistance Level: Contact guard assist  Skilled Clinical Factors: Initially SBA however slight posterior LOB; CGA for safety over incline/small threshold into shower. SBA for exiting shower. Functional Mobility  Device: Rolling walker  Activity: To/From bathroom  Assistance Level: Stand by assist  Skilled Clinical Factors: slow and shuffled gait vc for increased stride height and length, no LOB    Sit to Stand  Assistance Level: Stand by assist  Skilled Clinical Factors: cueing for hand placement.     Stand to Sit  Assistance Level: Stand by assist  Skilled Clinical Factors: vc for hand placement for controlled sit    Bed To/From Chair  Assistance Level: Stand by assist  Skilled Clinical Factors: RW amb from w/c in bathroom to recliner chair transfer. OT Exercises  Exercise Treatment: PM: BUE exercises with #3  free weight facilitated in all tolerated planes for 10 reps to support strength and endurance needed to engage in functional tasks safely and indep. Pt akilah DOWLING tolerance with activity     Assessment  Assessment  Activity Tolerance: Patient tolerated treatment well  Discharge Recommendations: Home with assist PRN;Patient would benefit from continued therapy after discharge  OT Equipment Recommendations  ADL Assistive Devices: Shower Chair with back; Reacher;Long-handled Sponge  Other: TBD  Safety Devices  Safety Devices in place: Yes  Type of devices: Left in chair;Gait belt    Patient Education  Education  Education Given To: Patient  Education Provided: Role of Therapy;Plan of Care;ADL Function;Transfer Training; Safety;Equipment; Fall Prevention Strategies; Energy Conservation  Education Provided Comments: pt edu on home safety and energy conservation when returning home. Good return noted. Education Method: Verbal  Barriers to Learning: None  Education Outcome: Verbalized understanding;Demonstrated understanding    Plan  Plan  Times per Week: 5-7  Times per Day: Twice a day  Current Treatment Recommendations: Strengthening;ROM;Balance training;Functional mobility training; Endurance training;Pain management; Safety education & training;Patient/Caregiver education & training;Equipment evaluation, education, & procurement;Self-Care / ADL;Return to work related activity; Coordination training    Goals  Patient Goals   Patient goals : \"To get as much accomplished as I can and to come out stronger to keep up with my jobs, my grandchildren, and the little ones\"  Short Term Goals  Time Frame for Short term goals:  One week  Short Term Goal 1: Pt will complete LB dressing/bathing/toileting tasks with CGA and Good safety to improve IND and safety with self care tasks. Short Term Goal 2: Pt will complete UB dressing/bathing tasks with supervision and Good safety to improve IND and safety with self care tasks. Short Term Goal 3: Pt will complete functional transfers/mobility with CGA and Good safety to improve IND and safety with self care and mobility tasks. Short Term Goal 4: Pt will tolerate standing for 6+ minutes during functional activity of choice with CGA and Good safety to improve IND and safety with self care and mobility tasks. Short Term Goal 5: Pt will verbalize/demonstrate Good understanding of at least 3 AD/DME/modified techniques to improve IND and safety with self care and mobility tasks. Additional Goals?: Yes  Short Term Goal 6: OTR/EM to further assess FMC/ strength. Short Term Goal 7: Pt will participate in 30+ minutes of therapeutic exercises/functional activities to improve IND and safety with self care and mobility tasks. Long Term Goals  Time Frame for Long term goals : By discharge  Long Term Goal 1: Pt will complete BADLs with modified IND and Good safety  to improve IND and safety with self care tasks. Long Term Goal 2: Pt will complete functional transfers/mobility with modified IND and Good safety to improve IND and safety with self care and mobility tasks. Long Term Goal 3: Pt will tolerate standing for 12+ minutes during functional activity of choice with modified IND and Good safety to improve IND and safety with self care and mobility tasks. Long Term Goal 4: Pt will verbalize/demonstrate Good understanding of at least 3 fall prevention/home safety strategies to improve IND and safety with self care and mobility tasks. Long Term Goal 5: Pt will complete medication management task with 100% accuracy with use of adaptive strategies to improve safety.   Additional Goals?: Yes  Long Term Goal 6: Pt will complete light housekeeping/meal preparation task with supervision and Good safety to improve IND and safety with ADL/IADL tasks.       Therapy Time   Individual Concurrent Group Co-treatment   Time In 1107         Time Out ALEX Can

## 2022-09-23 NOTE — PROGRESS NOTES
Pt. Resting in bed alert and in good spirits. Pt. Excited to go home today. Pt. Reports she slept well cont. To c/o pain to rt. Hip. PRN Percocet given this morning. Incision to rt. Hip no redness, swelling or drainage noted. Call light and fluids at bedside.

## 2022-09-23 NOTE — PLAN OF CARE
Problem: Discharge Planning  Goal: Discharge to home or other facility with appropriate resources  Outcome: Progressing     Problem: Safety - Adult  Goal: Free from fall injury  Outcome: Progressing     Problem: ABCDS Injury Assessment  Goal: Absence of physical injury  Outcome: Progressing     Problem: Nutrition Deficit:  Goal: Optimize nutritional status  Outcome: Progressing     Problem: Pain  Goal: Verbalizes/displays adequate comfort level or baseline comfort level  Outcome: Progressing     Problem: Skin/Tissue Integrity  Goal: Absence of new skin breakdown  Description: 1. Monitor for areas of redness and/or skin breakdown  2. Assess vascular access sites hourly  3. Every 4-6 hours minimum:  Change oxygen saturation probe site  4. Every 4-6 hours:  If on nasal continuous positive airway pressure, respiratory therapy assess nares and determine need for appliance change or resting period.   Outcome: Progressing

## 2022-09-23 NOTE — PROGRESS NOTES
[facility]    Progress note            Date:   9/18/2022  Patient name:  Claryce Mcardle  Date of admission:  9/15/2022  2:31 PM  MRN:   [id number]  Account:  [de-identified]  YOB: 1945  PCP:    Trent Goncalves DO  Room:   2617/2617-01  Code Status:    Full Code    Physician Requesting Consult: Deandra Colin MD    Reason for Consult:  Medical management    Chief Complaint:     No chief complaint on file. Debility secondary to right hip surgery    History Obtained From:     Patient, EMR, nursing staff    History of Present Illness:     82-year-old female admitted from outside hospital after ORIF for right hip fracture performed on 9/9.   Course complicated by anemia hemoglobin to 5.9 status post transfusion medical history significant for Sjogren's for which she is taking Plaquenil, CKD stage III antiphospholipid antibody syndrome, history of PE patient has IVC filter not on any AC      Past Medical History:     Past Medical History:   Diagnosis Date    CKD (chronic kidney disease) stage 3, GFR 30-59 ml/min     Dehydration     Dysphagia     GERD (gastroesophageal reflux disease)     Abelardo filter in place     History of DVT (deep vein thrombosis)     Hx: UTI (urinary tract infection)     Hypertension     Hyperthyroidism 1995    XRT    Hyperthyroidism     Hypothyroidism     due to Rx for Graves disease    Iron deficiency anemia     Lupus (HCC)     Metabolic acidosis     Pharyngitis     Pulmonary embolism (HCC)     Pyelonephritis     Pyelonephritis     Renal abscess     Right kidney mass     Seizures ([address]     SLE ([place] encephalitis)         Past Surgical History:     Past Surgical History:   Procedure Laterality Date    APPENDECTOMY      BUNIONECTOMY      BUNIONECTOMY      COLONOSCOPY      HYSTERECTOMY      REVISION COLOSTOMY      VENA CAVA FILTER PLACEMENT          Medications Prior to Admission:     Prior to Admission medications Medication Sig Start Date End Date Taking? Authorizing Provider   [facility] Does not apply route Duration: 3 months / Dx: R hip fracture    Unable to walk > 200 feet without assistive device 9/23/22  Yes Jamal Reina MD   oxyCODONE-acetaminophen (PERCOCET) 5-325 MG per tablet Take 1 tablet by mouth every 6 hours as needed for Pain for up to 7 days. 9/22/22 9/29/22 Yes Jamal Reina MD   enoxaparin (LOVENOX) 40 MG/0.4ML Inject 0.4 mLs into the skin daily 9/23/22  Yes Jamal Reina MD   lactobacillus (CULTURELLE) capsule Take 1 capsule by mouth 3 times daily (with meals) 9/22/22  Yes Jamal Reina MD   amLODIPine (NORVASC) 5 MG tablet Take 1 tablet by mouth daily 9/23/22  Yes Jamal Reina MD   fluocinonide (LIDEX) 0.05 % cream Apply topically 2 times daily. 9/22/22  Yes Jamal Reina MD   polyethylene glycol (GLYCOLAX) 17 g packet Take 17 g by mouth daily as needed for Constipation 9/22/22 10/22/22 Yes Jamal Reina MD   ascorbic acid (VITAMIN C) 500 MG tablet Take 1 tablet by mouth Twice a Week 9/26/22  Yes Jamal Reina MD   calcium carbonate 600 MG TABS tablet Take 650 mg by mouth Twice a Week   Yes Historical Provider, MD   vitamin D (CHOLECALCIFEROL) 25 MCG (1000 UT) TABS tablet Take 1,000 Units by mouth daily   Yes Historical Provider, MD   nicotine (NICODERM CQ) 7 MG/24HR Place 1 patch onto the skin every 24 hours   Yes Historical Provider, MD   dextromethorphan-guaiFENesin (MUCINEX DM)  MG per extended release tablet Take 2 tablets by mouth daily Reports drainage from her sinuses   Yes Historical Provider, MD   promethazine (PHENERGAN) 12.5 MG tablet Take 12.5 mg by mouth every 8 hours as needed for Nausea   Yes Historical Provider, MD   hydroxychloroquine (PLAQUENIL) 200 MG tablet Take by mouth daily    Historical Provider, MD   aspirin (ASPIR-LOW) 81 MG EC tablet Take 1 tablet by mouth daily.  10/22/14   Rupesh Nephew, DPM   levothyroxine (LEVOTHROID) 112 MCG tablet Take 1 tablet by mouth Daily. 10/22/14   Shilpa Yadav, DO   b complex vitamins capsule Take 1 capsule by mouth Twice a Week  9/22/22  Historical Provider, MD        Allergies:     Ertapenem, Codeine, and Nubain [nalbuphine hcl]    Social History:     Tobacco:    reports that she quit smoking about 12 years ago. She has a 18.50 pack-year smoking history. She has never used smokeless tobacco.  Alcohol:      reports current alcohol use. Drug Use:  reports no history of drug use. Family History:     Family History   Problem Relation Age of Onset    High Blood Pressure Mother     Stroke Mother     Substance Abuse Mother     Cancer Father     Heart Disease Father     High Blood Pressure Sister     Substance Abuse Brother     Stroke Maternal Grandmother     Stroke Paternal Grandfather        Review of Systems:     Positive and Negative as described in HPI. CONSTITUTIONAL:  negative for fevers, chills, sweats, fatigue, weight loss  HEENT:  negative for vision, hearing changes, runny nose, throat pain  RESPIRATORY:  negative for shortness of breath, cough, congestion, wheezing. CARDIOVASCULAR:  negative for chest pain, palpitations.   GASTROINTESTINAL:  negative for nausea, vomiting, diarrhea, constipation, change in bowel habits, abdominal pain   GENITOURINARY:  negative for difficulty of urination, burning with urination, frequency   INTEGUMENT:  negative for rash, skin lesions, easy bruising   HEMATOLOGIC/LYMPHATIC:  negative for swelling/edema   ALLERGIC/IMMUNOLOGIC:  negative for urticaria , itching  ENDOCRINE:  negative increase in drinking, increase in urination, hot or cold intolerance  MUSCULOSKELETAL:  post op right  hip pain, negative joint pains, muscle aches, swelling of joints  NEUROLOGICAL:  negative for headaches, dizziness, lightheadedness, numbness, pain, tingling extremities      Physical Exam:     /71   Pulse 56   Temp 97.7 °F (36.5 °C)   Resp 16   Ht 5' 3\" (1.6 m) Wt 125 lb 4 oz (56.8 kg)   SpO2 99%   BMI 22.19 kg/m²   Temp (24hrs), Av.7 °F (36.5 °C), Min:97.6 °F (36.4 °C), Max:97.7 °F (36.5 °C)        General Appearance:  alert, well appearing, and in no acute distress  Head:  normocephalic, atraumatic. Eye: no icterus, redness, pupils equal and reactive, extraocular eye movements intact, conjunctiva clear  Ear: normal external ear, no discharge, hearing intact  Nose:  no drainage noted  Mouth: mucous membranes moist  Neck: supple, no carotid bruits, thyroid not palpable  Lungs: Bilateral equal air entry, clear to ausculation, no wheezing, rales or rhonchi, normal effort  Cardiovascular: normal rate, regular rhythm, no murmur, gallop, rub.   Abdomen: Soft, nontender, nondistended, normal bowel sounds, no hepatomegaly or splenomegaly  Neurologic: There are no new focal motor or sensory deficits, normal muscle tone and bulk, no abnormal sensation, normal speech, cranial nerves II through XII grossly intact  Skin: No gross lesions, rashes, bruising or bleeding on exposed skin area  Extremities: right hip pain and restriction ROM,  peripheral pulses palpable, no pedal edema or calf pain with palpation  Psych: normal affect    Investigations:      Laboratory Testing:  Recent Results (from the past 24 hour(s))   Basic Metabolic Panel w/ Reflex to MG    Collection Time: 22  6:09 AM   Result Value Ref Range    Glucose 83 70 - 99 mg/dL    BUN 20 8 - 23 mg/dL    Creatinine 1.07 (H) 0.50 - 0.90 mg/dL    Calcium 9.0 8.6 - 10.4 mg/dL    Sodium 130 (L) 135 - 144 mmol/L    Potassium 4.2 3.7 - 5.3 mmol/L    Chloride 103 98 - 107 mmol/L    CO2 17 (L) 20 - 31 mmol/L    Anion Gap 10 9 - 17 mmol/L    GFR Non-African American 50 (L) >60 mL/min    GFR African American >60 >60 mL/min    GFR Comment         CBC auto differential    Collection Time: 22  6:09 AM   Result Value Ref Range    WBC 3.3 (L) 3.5 - 11.0 k/uL    RBC 2.82 (L) 4.0 - 5.2 m/uL    Hemoglobin 8.6 (L) 12.0 - 16.0 g/dL    Hematocrit 25.4 (L) 36 - 46 %    MCV 90.1 80 - 100 fL    MCH 30.5 26 - 34 pg    MCHC 33.9 31 - 37 g/dL    RDW 17.0 (H) 11.5 - 14.9 %    Platelets 577 737 - 846 k/uL    MPV 6.7 6.0 - 12.0 fL    Seg Neutrophils 74 (H) 36 - 66 %    Lymphocytes 9 (L) 24 - 44 %    Monocytes 7 1 - 7 %    Eosinophils % 2 0 - 4 %    Basophils 0 0 - 2 %    Bands 6 0 - 10 %    Metamyelocytes 1 (H) 0 %    Myelocytes 1 (H) 0 %    nRBC 1 (H) 0 per 100 WBC    Segs Absolute 2.42 1.3 - 9.1 k/uL    Absolute Lymph # 0.29 (L) 1.0 - 4.8 k/uL    Absolute Mono # 0.23 0.1 - 1.3 k/uL    Absolute Eos # 0.07 0.0 - 0.4 k/uL    Basophils Absolute 0.00 0.0 - 0.2 k/uL    Absolute Bands # 0.20 0.0 - 1.0 k/uL    Metamyelocytes Absolute 0.03 (H) 0 k/uL    Myelocytes Absolute 0.03 (H) 0 k/uL    Morphology ANISOCYTOSIS PRESENT     Morphology SLT POLYCHROMASIA            Imaging/Diagonstics:  Recent data reviewed    Assessment :      Primary Problem  Fracture of hip, closed, right, sequela    Active Hospital Problems    Diagnosis Date Noted    Fracture of hip, closed, right, sequela [S72.001S] 09/15/2022     Priority: Medium       Plan:     Debility secondary to right hip surgery ORIF for fracture-continue with physical therapy  Sjogren syndrome-continue with Plaquenil  CKD stage III-creatinine monitoring, 1.02 on admission  Acute blood loss anemia noted postop-will monitor hemoglobin.  9.0 today on admission  Hypothyroid-resume Synthroid  UTI-complete course of Keflex  Mild hyponatremia-monitor sodium; 131 on admission  History of PE, APL antibody-IVC filter in situ    DVT prophylaxis-mechanical only        9/21  Sodium 130 today stable from 131 at presentation  Hemoglobin 8.7  Creatinine stable  Swelling right leg with calf tenderness-we will get leg vein Dopplers  No significant swelling suggestive of hematoma at the operative site    9/22  No DVT on leg vein dopplers  Compression hosiery for leg swellilng    9/23  Hemoglobin and sodium stable  Patient in good spirits participating in physical therapy  Labs and vitals reviewed and satisfactory      Consultations:   Cathie Bynum MD  9/23/2022  10:49 AM    Copy sent to Dr. Vianey Felipe, DO    Please note that this chart was generated using voice recognition Dragon dictation software. Although every effort was made to ensure the accuracy of this automated transcription, some errors in transcription may have occurred.

## 2022-10-05 ENCOUNTER — HOSPITAL ENCOUNTER (OUTPATIENT)
Age: 77
Setting detail: SPECIMEN
Discharge: HOME | End: 2022-10-05
Payer: MEDICARE

## 2022-10-05 LAB
HCT VFR BLD CALC: 25.8 % (ref 36–46)
HEMOGLOBIN: 8.8 G/DL (ref 12–16)
MCH RBC QN AUTO: 31.8 PG (ref 26–34)
MCHC RBC AUTO-ENTMCNC: 34.1 G/DL (ref 31–37)
MCV RBC AUTO: 93.2 FL (ref 80–100)
PDW BLD-RTO: 20.4 % (ref 11.5–14.9)
PLATELET # BLD: 183 K/UL (ref 150–450)
PMV BLD AUTO: 8.2 FL (ref 6–12)
RBC # BLD: 2.77 M/UL (ref 4–5.2)
WBC # BLD: 4.7 K/UL (ref 3.5–11)

## 2022-10-05 PROCEDURE — 85027 COMPLETE CBC AUTOMATED: CPT

## 2022-10-17 RX ORDER — AMLODIPINE BESYLATE 5 MG/1
TABLET ORAL
Qty: 30 TABLET | Refills: 0 | OUTPATIENT
Start: 2022-10-17

## 2022-10-26 ENCOUNTER — HOSPITAL ENCOUNTER (OUTPATIENT)
Age: 77
Setting detail: SPECIMEN
Discharge: HOME | End: 2022-10-26
Payer: MEDICARE

## 2022-10-26 LAB
ABSOLUTE BANDS #: 0.05 K/UL (ref 0–1)
ABSOLUTE EOS #: 0.05 K/UL (ref 0–0.4)
ABSOLUTE LYMPH #: 0.2 K/UL (ref 1–4.8)
ABSOLUTE MONO #: 0.35 K/UL (ref 0.1–1.3)
ANION GAP SERPL CALCULATED.3IONS-SCNC: 12 MMOL/L (ref 9–17)
BANDS: 1 % (ref 0–10)
BASOPHILS # BLD: 0 % (ref 0–2)
BASOPHILS ABSOLUTE: 0 K/UL (ref 0–0.2)
BUN BLDV-MCNC: 25 MG/DL (ref 8–23)
CALCIUM SERPL-MCNC: 8.2 MG/DL (ref 8.6–10.4)
CHLORIDE BLD-SCNC: 107 MMOL/L (ref 98–107)
CO2: 14 MMOL/L (ref 20–31)
CREAT SERPL-MCNC: 1.34 MG/DL (ref 0.5–0.9)
EOSINOPHILS RELATIVE PERCENT: 1 % (ref 0–4)
GFR SERPL CREATININE-BSD FRML MDRD: 41 ML/MIN/1.73M2
GLUCOSE BLD-MCNC: 102 MG/DL (ref 70–99)
HCT VFR BLD CALC: 25.5 % (ref 36–46)
HEMOGLOBIN: 8.4 G/DL (ref 12–16)
LYMPHOCYTES # BLD: 4 % (ref 24–44)
MCH RBC QN AUTO: 32.8 PG (ref 26–34)
MCHC RBC AUTO-ENTMCNC: 33 G/DL (ref 31–37)
MCV RBC AUTO: 99.4 FL (ref 80–100)
MONOCYTES # BLD: 7 % (ref 1–7)
MORPHOLOGY: ABNORMAL
MORPHOLOGY: ABNORMAL
PDW BLD-RTO: 22 % (ref 11.5–14.9)
PLATELET # BLD: 120 K/UL (ref 150–450)
PMV BLD AUTO: 7.9 FL (ref 6–12)
POTASSIUM SERPL-SCNC: 4 MMOL/L (ref 3.7–5.3)
RBC # BLD: 2.57 M/UL (ref 4–5.2)
SEG NEUTROPHILS: 87 % (ref 36–66)
SEGMENTED NEUTROPHILS ABSOLUTE COUNT: 4.35 K/UL (ref 1.3–9.1)
SODIUM BLD-SCNC: 133 MMOL/L (ref 135–144)
WBC # BLD: 5 K/UL (ref 3.5–11)

## 2022-10-26 PROCEDURE — 85025 COMPLETE CBC W/AUTO DIFF WBC: CPT

## 2022-10-26 PROCEDURE — 80048 BASIC METABOLIC PNL TOTAL CA: CPT

## 2024-10-21 NOTE — PLAN OF CARE
Problem: Discharge Planning  Goal: Discharge to home or other facility with appropriate resources  Outcome: Progressing  Flowsheets (Taken 9/20/2022 1134)  Discharge to home or other facility with appropriate resources:   Identify barriers to discharge with patient and caregiver   Arrange for needed discharge resources and transportation as appropriate   Identify discharge learning needs (meds, wound care, etc)   Refer to discharge planning if patient needs post-hospital services based on physician order or complex needs related to functional status, cognitive ability or social support system     Problem: Safety - Adult  Goal: Free from fall injury  Outcome: Progressing     Problem: ABCDS Injury Assessment  Goal: Absence of physical injury  Outcome: Progressing     Problem: Nutrition Deficit:  Goal: Optimize nutritional status  Outcome: Progressing     Problem: Pain  Goal: Verbalizes/displays adequate comfort level or baseline comfort level  Outcome: Progressing     Problem: Skin/Tissue Integrity  Goal: Absence of new skin breakdown  Description: 1. Monitor for areas of redness and/or skin breakdown  2. Assess vascular access sites hourly  3. Every 4-6 hours minimum:  Change oxygen saturation probe site  4. Every 4-6 hours:  If on nasal continuous positive airway pressure, respiratory therapy assess nares and determine need for appliance change or resting period.   Outcome: Progressing Her/She